# Patient Record
Sex: FEMALE | Race: WHITE | Employment: OTHER | URBAN - METROPOLITAN AREA
[De-identification: names, ages, dates, MRNs, and addresses within clinical notes are randomized per-mention and may not be internally consistent; named-entity substitution may affect disease eponyms.]

---

## 2021-08-17 ENCOUNTER — HOSPITAL ENCOUNTER (EMERGENCY)
Age: 86
Discharge: HOME | End: 2021-08-18
Attending: EMERGENCY MEDICINE
Payer: MEDICARE

## 2021-08-17 DIAGNOSIS — E16.2 HYPOGLYCEMIA: Primary | ICD-10-CM

## 2021-08-17 PROCEDURE — 99285 EMERGENCY DEPT VISIT HI MDM: CPT

## 2021-08-18 VITALS
BODY MASS INDEX: 29.27 KG/M2 | OXYGEN SATURATION: 96 % | HEART RATE: 71 BPM | DIASTOLIC BLOOD PRESSURE: 67 MMHG | SYSTOLIC BLOOD PRESSURE: 161 MMHG | RESPIRATION RATE: 24 BRPM | WEIGHT: 155 LBS | HEIGHT: 61 IN | TEMPERATURE: 98.1 F

## 2021-08-18 LAB
ALBUMIN SERPL-MCNC: 4 G/DL (ref 3.2–4.6)
ALBUMIN/GLOB SERPL: 1.2 {RATIO} (ref 1.2–3.5)
ALP SERPL-CCNC: 50 U/L (ref 50–136)
ALT SERPL-CCNC: 22 U/L (ref 12–65)
ANION GAP SERPL CALC-SCNC: 8 MMOL/L (ref 7–16)
AST SERPL-CCNC: 29 U/L (ref 15–37)
ATRIAL RATE: 241 BPM
BASOPHILS # BLD: 0 K/UL (ref 0–0.2)
BASOPHILS NFR BLD: 1 % (ref 0–2)
BILIRUB SERPL-MCNC: 0.8 MG/DL (ref 0.2–1.1)
BUN SERPL-MCNC: 32 MG/DL (ref 8–23)
CALCIUM SERPL-MCNC: 8.6 MG/DL (ref 8.3–10.4)
CALCULATED R AXIS, ECG10: 75 DEGREES
CALCULATED T AXIS, ECG11: 98 DEGREES
CHLORIDE SERPL-SCNC: 104 MMOL/L (ref 98–107)
CO2 SERPL-SCNC: 26 MMOL/L (ref 21–32)
CREAT SERPL-MCNC: 1.4 MG/DL (ref 0.6–1)
DIAGNOSIS, 93000: NORMAL
DIFFERENTIAL METHOD BLD: ABNORMAL
EOSINOPHIL # BLD: 0 K/UL (ref 0–0.8)
EOSINOPHIL NFR BLD: 0 % (ref 0.5–7.8)
ERYTHROCYTE [DISTWIDTH] IN BLOOD BY AUTOMATED COUNT: 13.5 % (ref 11.9–14.6)
GLOBULIN SER CALC-MCNC: 3.4 G/DL (ref 2.3–3.5)
GLUCOSE BLD STRIP.AUTO-MCNC: 131 MG/DL (ref 65–100)
GLUCOSE BLD STRIP.AUTO-MCNC: 157 MG/DL (ref 65–100)
GLUCOSE SERPL-MCNC: 222 MG/DL (ref 65–100)
HCT VFR BLD AUTO: 43.6 % (ref 35.8–46.3)
HGB BLD-MCNC: 13.4 G/DL (ref 11.7–15.4)
IMM GRANULOCYTES # BLD AUTO: 0 K/UL (ref 0–0.5)
IMM GRANULOCYTES NFR BLD AUTO: 1 % (ref 0–5)
LYMPHOCYTES # BLD: 0.6 K/UL (ref 0.5–4.6)
LYMPHOCYTES NFR BLD: 8 % (ref 13–44)
MCH RBC QN AUTO: 30 PG (ref 26.1–32.9)
MCHC RBC AUTO-ENTMCNC: 30.7 G/DL (ref 31.4–35)
MCV RBC AUTO: 97.8 FL (ref 79.6–97.8)
MONOCYTES # BLD: 0.5 K/UL (ref 0.1–1.3)
MONOCYTES NFR BLD: 6 % (ref 4–12)
NEUTS SEG # BLD: 7.1 K/UL (ref 1.7–8.2)
NEUTS SEG NFR BLD: 85 % (ref 43–78)
NRBC # BLD: 0 K/UL (ref 0–0.2)
PLATELET # BLD AUTO: 134 K/UL (ref 150–450)
PMV BLD AUTO: 11.5 FL (ref 9.4–12.3)
POTASSIUM SERPL-SCNC: 4.1 MMOL/L (ref 3.5–5.1)
PROT SERPL-MCNC: 7.4 G/DL (ref 6.3–8.2)
Q-T INTERVAL, ECG07: 374 MS
QRS DURATION, ECG06: 72 MS
QTC CALCULATION (BEZET), ECG08: 403 MS
RBC # BLD AUTO: 4.46 M/UL (ref 4.05–5.2)
SERVICE CMNT-IMP: ABNORMAL
SERVICE CMNT-IMP: ABNORMAL
SODIUM SERPL-SCNC: 138 MMOL/L (ref 136–145)
TSH SERPL DL<=0.005 MIU/L-ACNC: 5.45 UIU/ML (ref 0.36–3.74)
VENTRICULAR RATE, ECG03: 70 BPM
WBC # BLD AUTO: 8.4 K/UL (ref 4.3–11.1)

## 2021-08-18 PROCEDURE — 80053 COMPREHEN METABOLIC PANEL: CPT

## 2021-08-18 PROCEDURE — 93005 ELECTROCARDIOGRAM TRACING: CPT | Performed by: EMERGENCY MEDICINE

## 2021-08-18 PROCEDURE — 82962 GLUCOSE BLOOD TEST: CPT

## 2021-08-18 PROCEDURE — 84443 ASSAY THYROID STIM HORMONE: CPT

## 2021-08-18 PROCEDURE — 85025 COMPLETE CBC W/AUTO DIFF WBC: CPT

## 2021-08-18 NOTE — ED PROVIDER NOTES
28-year-old  female with history of diabetes, CKD, hypertension and depression presents to the emergency department complaining of altered mental status. According the patient, she took her normal doses of insulin and oral agents today, but is concerned she might not have eaten dinner this evening. According the patient, she has a deal with a friend of hers in the each call every evening to make sure the other is doing okay before going to bed. When a friend was unable to reach her, she called her daughter who called EMS. EMS found patient on the floor, somewhat giddy but very confused. Her blood sugar was 50, she was given oral glucose without improvement but D50 improved her mental status a great deal.  She denies any headache, paralysis or paresthesias. She denies any nausea or vomiting. She knows it is just after midnight and now Wednesday, knows who the president is but has little trouble with the year. She knows she is in the hospital.  She denies any pain. The history is provided by the patient and the EMS personnel. The history is limited by the condition of the patient. Low Blood Sugar   This is a new problem. The current episode started less than 1 hour ago. The problem has been rapidly improving. Associated symptoms include confusion and weakness. Pertinent negatives include no somnolence, no seizures, no unresponsiveness, no agitation, no delusions, no hallucinations, no self-injury, no violence, no tingling and no numbness. Mental status baseline is normal.  Risk factors: Diabetic and lives alone. Her past medical history is significant for diabetes, hypertension and depression. Her past medical history does not include seizures, liver disease, CVA, TIA, AIDS, COPD, dementia, psychotropic medication treatment, head trauma or heart disease.         Past Medical History:   Diagnosis Date    Chronic kidney disease     renal insufficiency    [facility] [address]     Diabetes ([facility] [address] [facility] 11/13/2014    Hypertension     Obesity 7/26/2016    Psychiatric disorder     depeession       Past Surgical History:   Procedure Laterality Date    HX CHOLECYSTECTOMY           No family history on file. Social History     Socioeconomic History    Marital status:      Spouse name: Not on file    Number of children: Not on file    Years of education: Not on file    Highest education level: Not on file   Occupational History    Not on file   Tobacco Use    Smoking status: Never Smoker   Substance and Sexual Activity    Alcohol use: No    Drug use: Not on file    Sexual activity: Not on file   Other Topics Concern    Not on file   Social History Narrative    Not on file     Social Determinants of Health     Financial Resource Strain:     Difficulty of Paying Living Expenses:    Food Insecurity:     Worried About Running Out of Food in the Last Year:     [address] in the Last Year:    Transportation Needs:     Lack of Transportation (Medical):  Lack of Transportation (Non-Medical):    Physical Activity:     Days of Exercise per Week:     Minutes of Exercise per Session:    Stress:     Feeling of Stress :    Social Connections:     Frequency of Communication with Friends and Family:     Frequency of Social Gatherings with Friends and Family:     Attends [employer] Services:     Active Member of Clubs or Organizations:     Attends Club or Organization Meetings:     Marital Status:    Intimate Partner Violence:     Fear of Current or Ex-Partner:     Emotionally Abused:     Physically Abused:     Sexually Abused: ALLERGIES: Ciprocinonide, Citalopram, Pioglitazone, Celecoxib, Codeine, Lisinopril, and Metronidazole    Review of Systems   Constitutional: Negative for chills, fatigue and fever. HENT: Negative for congestion. Gastrointestinal: Negative for abdominal pain, nausea and vomiting. Genitourinary: Negative for dysuria.    Neurological: Positive for weakness. Negative for tingling, seizures, facial asymmetry, light-headedness, numbness and headaches. Psychiatric/Behavioral: Positive for confusion. Negative for agitation, hallucinations and self-injury. All other systems reviewed and are negative. Vitals:    08/18/21 0005 08/18/21 0009   BP:  (!) 221/119   Pulse:  71   Resp: 24 24   Temp:  98.1 °F (36.7 °C)   SpO2:  97%   Weight: 70.3 kg (155 lb) 70.3 kg (155 lb)   Height: 5' 1\" (1.549 m) 5' 1\" (1.549 m)            Physical Exam  Vitals and nursing note reviewed. Constitutional:       General: She is not in acute distress. Appearance: She is well-developed. HENT:      Head: Normocephalic and atraumatic. Right Ear: External ear normal.      Left Ear: External ear normal.      Mouth/Throat:      Mouth: Mucous membranes are moist.   Eyes:      Extraocular Movements: Extraocular movements intact. Conjunctiva/sclera: Conjunctivae normal.      Pupils: Pupils are equal, round, and reactive to light. Cardiovascular:      Rate and Rhythm: Normal rate and regular rhythm. Heart sounds: Normal heart sounds. Pulmonary:      Effort: Pulmonary effort is normal.      Breath sounds: Normal breath sounds. Abdominal:      General: Bowel sounds are normal.      Palpations: Abdomen is soft. Tenderness: There is no abdominal tenderness. Musculoskeletal:         General: Normal range of motion. Cervical back: Normal range of motion and neck supple. Skin:     General: Skin is warm and dry. Capillary Refill: Capillary refill takes less than 2 seconds. Neurological:      Mental Status: She is alert and oriented to person, place, and time. Cranial Nerves: Cranial nerves are intact. Sensory: Sensation is intact. Motor: Motor function is intact. Coordination: Coordination is intact.    Psychiatric:         Mood and Affect: Mood and affect normal.         Speech: Speech normal.         Behavior: Behavior normal. Cognition and Memory: Cognition normal. She exhibits impaired recent memory. MDM  Number of Diagnoses or Management Options  Hypoglycemia: new and requires workup     Amount and/or Complexity of Data Reviewed  Clinical lab tests: ordered and reviewed  Tests in the medicine section of CPT®: ordered and reviewed (ECG interpretation for ECG dated 18 August 2021 at 12:10 a.m.: ECG reveals atrial fibrillation rate of 70 bpm, old septal infarct, normal QTC. No evidence of ischemic change. Abnormal ECG. Maty Redman MD  )  Review and summarize past medical records: yes    Risk of Complications, Morbidity, and/or Mortality  Presenting problems: moderate  Diagnostic procedures: minimal  Management options: moderate    Patient Progress  Patient progress: improved         Procedures    The patient was observed in the ED. patient had a fairly full meal, was observed for another hour in the emergency department on recheck the patient's blood sugar was 157. Patient was instructed to make sure that she ate as she was supposed to, because if she does not eat her blood sugar will drop and he could hurt her or even kill her. Patient voiced her understanding, was ambulated in the emergency department without any difficulty. She will be discharged home with instructions to follow-up with her family doctor with phone call tomorrow.     Results Reviewed:      Recent Results (from the past 24 hour(s))   GLUCOSE, POC    Collection Time: 08/18/21 12:12 AM   Result Value Ref Range    Glucose (POC) 131 (H) 65 - 100 mg/dL    Performed by Jesse    CBC WITH AUTOMATED DIFF    Collection Time: 08/18/21 12:18 AM   Result Value Ref Range    WBC 8.4 4.3 - 11.1 K/uL    RBC 4.46 4.05 - 5.2 M/uL    HGB 13.4 11.7 - 15.4 g/dL    HCT 43.6 35.8 - 46.3 %    MCV 97.8 79.6 - 97.8 FL    MCH 30.0 26.1 - 32.9 PG    MCHC 30.7 (L) 31.4 - 35.0 g/dL    RDW 13.5 11.9 - 14.6 %    PLATELET 002 (L) 221 - 450 K/uL    MPV 11.5 9.4 - 12.3 FL ABSOLUTE NRBC 0.00 0.0 - 0.2 K/uL    DF AUTOMATED      NEUTROPHILS 85 (H) 43 - 78 %    LYMPHOCYTES 8 (L) 13 - 44 %    MONOCYTES 6 4.0 - 12.0 %    EOSINOPHILS 0 (L) 0.5 - 7.8 %    BASOPHILS 1 0.0 - 2.0 %    IMMATURE GRANULOCYTES 1 0.0 - 5.0 %    ABS. NEUTROPHILS 7.1 1.7 - 8.2 K/UL    ABS. LYMPHOCYTES 0.6 0.5 - 4.6 K/UL    ABS. MONOCYTES 0.5 0.1 - 1.3 K/UL    ABS. EOSINOPHILS 0.0 0.0 - 0.8 K/UL    ABS. BASOPHILS 0.0 0.0 - 0.2 K/UL    ABS. IMM. GRANS. 0.0 0.0 - 0.5 K/UL   METABOLIC PANEL, COMPREHENSIVE    Collection Time: 08/18/21 12:18 AM   Result Value Ref Range    Sodium 138 136 - 145 mmol/L    Potassium 4.1 3.5 - 5.1 mmol/L    Chloride 104 98 - 107 mmol/L    CO2 26 21 - 32 mmol/L    Anion gap 8 7 - 16 mmol/L    Glucose 222 (H) 65 - 100 mg/dL    BUN 32 (H) 8 - 23 MG/DL    Creatinine 1.40 (H) 0.6 - 1.0 MG/DL    GFR est AA 46 (L) >60 ml/min/1.73m2    GFR est non-AA 38 (L) >60 ml/min/1.73m2    Calcium 8.6 8.3 - 10.4 MG/DL    Bilirubin, total 0.8 0.2 - 1.1 MG/DL    ALT (SGPT) 22 12 - 65 U/L    AST (SGOT) 29 15 - 37 U/L    Alk. phosphatase 50 50 - 136 U/L    Protein, total 7.4 6.3 - 8.2 g/dL    Albumin 4.0 3.2 - 4.6 g/dL    Globulin 3.4 2.3 - 3.5 g/dL    A-G Ratio 1.2 1.2 - 3.5     TSH 3RD GENERATION    Collection Time: 08/18/21 12:18 AM   Result Value Ref Range    TSH 5.450 (H) 0.358 - 3.740 uIU/mL   GLUCOSE, POC    Collection Time: 08/18/21  2:13 AM   Result Value Ref Range    Glucose (POC) 157 (H) 65 - 100 mg/dL    Performed by Darryl        I discussed the results of all labs, procedures, radiographs, and treatments with the patient and available family. Treatment plan is agreed upon and the patient is ready for discharge. All voiced understanding of the discharge plan and medication instructions or changes as appropriate. Questions about treatment in the ED were answered. All were encouraged to return should symptoms worsen or new problems develop.

## 2021-08-18 NOTE — ED NOTES
I have reviewed discharge instructions with the patient. The patient verbalized understanding. Patient left ED via Discharge Method: ambulatory to Home by self    Opportunity for questions and clarification provided. Patient given 0 scripts. To continue your aftercare when you leave the hospital, you may receive an automated call from our care team to check in on how you are doing. This is a free service and part of our promise to provide the best care and service to meet your aftercare needs.  If you have questions, or wish to unsubscribe from this service please call [phone]. Thank you for Choosing our [facility] Emergency Department.

## 2021-08-18 NOTE — ED TRIAGE NOTES
Patient arrives via EMS from home with hypoglycemia. Patient was found on floor by EMS, family called because she had not been answering the phone. EMS noted BGL 52, gave 15 mg oral glucose and 250ml D10. EMS states patient had improvement in mental status after the D10. Patient arrives alert and oriented to person, place, time.

## 2021-11-15 ENCOUNTER — HOSPITAL ENCOUNTER (EMERGENCY)
Age: 86
Discharge: HOME | End: 2021-11-15
Attending: EMERGENCY MEDICINE
Payer: MEDICARE

## 2021-11-15 ENCOUNTER — APPOINTMENT (OUTPATIENT)
Dept: CT IMAGING | Age: 86
End: 2021-11-15
Attending: EMERGENCY MEDICINE
Payer: MEDICARE

## 2021-11-15 VITALS
DIASTOLIC BLOOD PRESSURE: 85 MMHG | WEIGHT: 155 LBS | RESPIRATION RATE: 17 BRPM | HEART RATE: 63 BPM | BODY MASS INDEX: 29.27 KG/M2 | SYSTOLIC BLOOD PRESSURE: 168 MMHG | OXYGEN SATURATION: 95 % | TEMPERATURE: 97.8 F | HEIGHT: 61 IN

## 2021-11-15 DIAGNOSIS — E01.0 THYROMEGALY: ICD-10-CM

## 2021-11-15 DIAGNOSIS — S16.1XXA STRAIN OF NECK MUSCLE, INITIAL ENCOUNTER: ICD-10-CM

## 2021-11-15 DIAGNOSIS — S09.90XA CLOSED HEAD INJURY, INITIAL ENCOUNTER: Primary | ICD-10-CM
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
## 2021-11-15 DIAGNOSIS — R73.9 HYPERGLYCEMIA: ICD-10-CM

## 2021-11-15 LAB
GLUCOSE BLD STRIP.AUTO-MCNC: 213 MG/DL (ref 65–100)
SERVICE CMNT-IMP: ABNORMAL

## 2021-11-15 PROCEDURE — 74011250636 HC RX REV CODE- 250/636: Performed by: EMERGENCY MEDICINE

## 2021-11-15 PROCEDURE — 72125 CT NECK SPINE W/O DYE: CPT

## 2021-11-15 PROCEDURE — 70450 CT HEAD/BRAIN W/O DYE: CPT

## 2021-11-15 PROCEDURE — 82962 GLUCOSE BLOOD TEST: CPT

## 2021-11-15 PROCEDURE — 99284 EMERGENCY DEPT VISIT MOD MDM: CPT

## 2021-11-15 PROCEDURE — 90715 TDAP VACCINE 7 YRS/> IM: CPT | Performed by: EMERGENCY MEDICINE

## 2021-11-15 PROCEDURE — 90471 IMMUNIZATION ADMIN: CPT

## 2021-11-15 RX ADMIN — TETANUS TOXOID, REDUCED DIPHTHERIA TOXOID AND ACELLULAR PERTUSSIS VACCINE, ADSORBED 0.5 ML: 5; 2.5; 8; 8; 2.5 SUSPENSION INTRAMUSCULAR at 14:51

## 2021-11-15 NOTE — DISCHARGE INSTRUCTIONS
Continue your current medications  Use Tylenol as needed for headache pain  Monitor your blood sugar carefully    Please call your endocrinologist in the morning to discuss the enlarged thyroid gland that we found on the CAT scan of your neck    Call your doctor/follow up doctor to set up appointment for recheck visit    Return to ER for any worsening symptoms or new problems which may arise

## 2021-11-15 NOTE — ED TRIAGE NOTES
Pt arrives via EMS from home with CO mechanical fall, laid on the ground for 2 hours. Abrasion to posterior head. Denies LOC, takes eliquis for AF.  VSS with EMS, pt p/o x 4

## 2021-11-15 NOTE — ED PROVIDER NOTES
42-year-old female presents to the ER with complaints of pain and swelling to the back of her head and some neck pain also. Patient states that when she was taking her trash out today the garbage can lid knocked her backwards and she fell backward striking the back of her head  She denies loss of consciousness  Patient did unfortunately on the cold concrete for between 1 to 2 hours. She was rescued by [employer]. They did summon EMS to help the patient up and she was brought here to the ER for further evaluation  Currently the patient is awake alert and oriented really only complains of some discomfort in the back of her head and some mild diffuse neck pain  She has had no vision or hearing changes she is not been dizzy lightheaded or nauseous. She denies any numbness or weakness in any of her extremities    The history is provided by the patient, a relative and the EMS personnel. Fall  The accident occurred 3 to 5 hours ago. The fall occurred while standing. She fell from a height of ground level. She landed on concrete. The volume of blood lost was minimal. The point of impact was the head and neck. The pain is present in the head and neck. The pain is moderate. She was not ambulatory at the scene. There was no entrapment after the fall. There was no drug use involved in the accident. There was no alcohol use involved in the accident. Associated symptoms include laceration. Pertinent negatives include no fever, no numbness, no abdominal pain, no vomiting, no headaches, no extremity weakness, no hearing loss, no loss of consciousness and no tingling. The risk factors include being elderly. The symptoms are aggravated by activity. She has tried nothing for the symptoms. It is unknown when the patient last had a tetanus shot.         Past Medical History:   Diagnosis Date    Chronic kidney disease     renal insufficiency    [facility] [address]     [facility]    Hypertension     Obesity 7/26/2016    Psychiatric disorder     depeession       Past Surgical History:   Procedure Laterality Date    HX CHOLECYSTECTOMY           No family history on file. Social History     Socioeconomic History    Marital status:      Spouse name: Not on file    Number of children: Not on file    Years of education: Not on file    Highest education level: Not on file   Occupational History    Not on file   Tobacco Use    Smoking status: Never Smoker    Smokeless tobacco: Not on file   Substance and Sexual Activity    Alcohol use: No    Drug use: Not on file    Sexual activity: Not on file   Other Topics Concern    Not on file   Social History Narrative    Not on file     Social Determinants of Health     Financial Resource Strain:     Difficulty of Paying Living Expenses: Not on file   Food Insecurity:     Worried About Running Out of Food in the Last Year: Not on file    Boni of Food in the Last Year: Not on file   Transportation Needs:     Lack of Transportation (Medical): Not on file    Lack of Transportation (Non-Medical):  Not on file   Physical Activity:     Days of Exercise per Week: Not on file    Minutes of Exercise per Session: Not on file   Stress:     Feeling of Stress : Not on file   Social Connections:     Frequency of Communication with Friends and Family: Not on file    Frequency of Social Gatherings with Friends and Family: Not on file    Attends [employer]ism Services: Not on file    Active Member of Clubs or Organizations: Not on file    Attends Club or Organization Meetings: Not on file    Marital Status: Not on file   Intimate Partner Violence:     Fear of Current or Ex-Partner: Not on file    Emotionally Abused: Not on file    Physically Abused: Not on file    Sexually Abused: Not on file   Housing Stability:     Unable to Pay for Housing in the Last Year: Not on file    Number of [place] in the Last Year: Not on file    Unstable Housing in the Last Year: Not on file         ALLERGIES: Ciprocinonide, Citalopram, Pioglitazone, Celecoxib, Codeine, Lisinopril, and Metronidazole    Review of Systems   Constitutional: Negative for activity change, appetite change, chills and fever. HENT: Negative for congestion, ear pain and rhinorrhea. Eyes: Negative for photophobia and discharge. Respiratory: Negative for cough and shortness of breath. Cardiovascular: Positive for leg swelling. Negative for chest pain and palpitations. Gastrointestinal: Negative for abdominal pain, constipation, diarrhea and vomiting. Endocrine: Negative for cold intolerance and heat intolerance. Genitourinary: Negative for dysuria and flank pain. Musculoskeletal: Positive for arthralgias. Negative for extremity weakness, myalgias and neck pain. Skin: Positive for wound. Negative for rash. Allergic/Immunologic: Negative for environmental allergies and food allergies. Neurological: Negative for tingling, loss of consciousness, syncope, numbness and headaches. Hematological: Negative for adenopathy. Does not bruise/bleed easily. Psychiatric/Behavioral: Negative for dysphoric mood. The patient is not nervous/anxious. All other systems reviewed and are negative. Vitals:    11/15/21 1335   BP: (!) 189/76   Pulse: 68   Resp: 20   Temp: 97.8 °F (36.6 °C)   SpO2: 95%   Weight: 70.3 kg (155 lb)   Height: 5' 1\" (1.549 m)            Physical Exam  Vitals and nursing note reviewed. Constitutional:       General: She is in acute distress. Appearance: Normal appearance. She is well-developed and normal weight. HENT:      Head: Normocephalic. Right Ear: External ear normal.      Left Ear: External ear normal.      Nose: Nose normal.      Mouth/Throat:      Mouth: Mucous membranes are moist.      Pharynx: Oropharynx is clear. No oropharyngeal exudate or posterior oropharyngeal erythema. Eyes:      Extraocular Movements: Extraocular movements intact. Conjunctiva/sclera: Conjunctivae normal.      Pupils: Pupils are equal, round, and reactive to light. Neck:      Vascular: No JVD. Cardiovascular:      Rate and Rhythm: Normal rate and regular rhythm. Heart sounds: Normal heart sounds. No murmur heard. No friction rub. No gallop. Pulmonary:      Effort: Pulmonary effort is normal.      Breath sounds: Normal breath sounds. Abdominal:      General: Bowel sounds are normal. There is no distension. Palpations: Abdomen is soft. There is no mass. Tenderness: There is no abdominal tenderness. Musculoskeletal:         General: No deformity. Normal range of motion. Cervical back: Neck supple. Muscular tenderness present. Skin:     General: Skin is warm and dry. Capillary Refill: Capillary refill takes less than 2 seconds. Findings: Laceration present. No rash. Neurological:      Mental Status: She is alert and oriented to person, place, and time. Cranial Nerves: No cranial nerve deficit. Sensory: No sensory deficit. Gait: Gait normal.   Psychiatric:         Mood and Affect: Mood and affect normal.         Speech: Speech normal.         Behavior: Behavior normal. Behavior is cooperative. Thought Content:  Thought content normal.         Cognition and Memory: Cognition and memory normal.         Judgment: Judgment normal.          MDM  Number of Diagnoses or Management Options  Closed head injury, initial encounter: new and requires workup  Fall, initial encounter: new and requires workup  Hyperglycemia: established and worsening  Strain of neck muscle, initial encounter: new and requires workup  Thyromegaly: new and requires workup  Diagnosis management comments: 43-year-old female presents after mechanical fall at home earlier today  She is currently awake alert and oriented does have some discomfort in the back of her head but otherwise feels reasonably well    We will check CT head and CT cervical spine    Remainder of her exam is unrevealing. Specifically she has no neurologic deficits. She is awake alert and oriented with clear speech    We will update her tetanus    3:04 PM  CT head revealed subcu hematoma but no fracture or intracranial hemorrhage  CT cervical spine revealed concern over enlarged thyroid but no other acute injury    Findings are reviewed with patient and son at bedside    Patient is stable for discharge  Advised to call her endocrinologist tomorrow to follow-up on the thyroid gland enlargement. Amount and/or Complexity of Data Reviewed  Tests in the radiology section of CPT®: ordered and reviewed  Tests in the medicine section of CPT®: ordered and reviewed  Decide to obtain previous medical records or to obtain history from someone other than the patient: yes  Review and summarize past medical records: yes  Independent visualization of images, tracings, or specimens: yes    Risk of Complications, Morbidity, and/or Mortality  Presenting problems: moderate  Diagnostic procedures: moderate  Management options: moderate  General comments: Elements of this note have been dictated via voice recognition software. Text and phrases may be limited by the accuracy of the software. The chart has been reviewed, but errors may still be present.       Patient Progress  Patient progress: improved         Procedures

## 2021-12-12 ENCOUNTER — HOSPITAL ENCOUNTER (INPATIENT)
Age: 86
LOS: 1 days | Discharge: NURSING, REHAB OR LONG-TERM CARE | DRG: 690 | End: 2021-12-15
Attending: EMERGENCY MEDICINE | Admitting: INTERNAL MEDICINE
Payer: MEDICARE

## 2021-12-12 ENCOUNTER — APPOINTMENT (OUTPATIENT)
Dept: CT IMAGING | Age: 86
DRG: 690 | End: 2021-12-12
Attending: EMERGENCY MEDICINE
Payer: MEDICARE

## 2021-12-12 DIAGNOSIS — G89.4 CHRONIC PAIN DISORDER: ICD-10-CM

## 2021-12-12 DIAGNOSIS — N30.00 ACUTE CYSTITIS WITHOUT HEMATURIA: Primary | ICD-10-CM

## 2021-12-12 DIAGNOSIS — F41.9 ANXIETY: ICD-10-CM

## 2021-12-12 LAB
ANION GAP SERPL CALC-SCNC: 7 MMOL/L (ref 7–16)
BACTERIA URNS QL MICRO: ABNORMAL /HPF
BASOPHILS # BLD: 0 K/UL (ref 0–0.2)
BASOPHILS NFR BLD: 0 % (ref 0–2)
BUN SERPL-MCNC: 25 MG/DL (ref 8–23)
CALCIUM SERPL-MCNC: 9.1 MG/DL (ref 8.3–10.4)
CASTS URNS QL MICRO: ABNORMAL /LPF
CHLORIDE SERPL-SCNC: 103 MMOL/L (ref 98–107)
CO2 SERPL-SCNC: 28 MMOL/L (ref 21–32)
CREAT SERPL-MCNC: 1.5 MG/DL (ref 0.6–1)
DIFFERENTIAL METHOD BLD: ABNORMAL
EOSINOPHIL # BLD: 0.3 K/UL (ref 0–0.8)
EOSINOPHIL NFR BLD: 2 % (ref 0.5–7.8)
EPI CELLS #/AREA URNS HPF: 0 /HPF
ERYTHROCYTE [DISTWIDTH] IN BLOOD BY AUTOMATED COUNT: 13.8 % (ref 11.9–14.6)
GLUCOSE SERPL-MCNC: 241 MG/DL (ref 65–100)
HCT VFR BLD AUTO: 39.1 % (ref 35.8–46.3)
HGB BLD-MCNC: 12.3 G/DL (ref 11.7–15.4)
IMM GRANULOCYTES # BLD AUTO: 0 K/UL (ref 0–0.5)
IMM GRANULOCYTES NFR BLD AUTO: 0 % (ref 0–5)
LACTATE SERPL-SCNC: 1.5 MMOL/L (ref 0.4–2)
LYMPHOCYTES # BLD: 0.9 K/UL (ref 0.5–4.6)
LYMPHOCYTES NFR BLD: 8 % (ref 13–44)
MAGNESIUM SERPL-MCNC: 1.7 MG/DL (ref 1.8–2.4)
MCH RBC QN AUTO: 29.7 PG (ref 26.1–32.9)
MCHC RBC AUTO-ENTMCNC: 31.5 G/DL (ref 31.4–35)
MCV RBC AUTO: 94.4 FL (ref 79.6–97.8)
MONOCYTES # BLD: 0.6 K/UL (ref 0.1–1.3)
MONOCYTES NFR BLD: 5 % (ref 4–12)
NEUTS SEG # BLD: 9.6 K/UL (ref 1.7–8.2)
NEUTS SEG NFR BLD: 84 % (ref 43–78)
NRBC # BLD: 0 K/UL (ref 0–0.2)
PLATELET # BLD AUTO: 177 K/UL (ref 150–450)
PMV BLD AUTO: 11.2 FL (ref 9.4–12.3)
POTASSIUM SERPL-SCNC: 3.8 MMOL/L (ref 3.5–5.1)
RBC # BLD AUTO: 4.14 M/UL (ref 4.05–5.2)
RBC #/AREA URNS HPF: ABNORMAL /HPF
SODIUM SERPL-SCNC: 138 MMOL/L (ref 136–145)
WBC # BLD AUTO: 11.4 K/UL (ref 4.3–11.1)
WBC URNS QL MICRO: >100 /HPF

## 2021-12-12 PROCEDURE — 96365 THER/PROPH/DIAG IV INF INIT: CPT

## 2021-12-12 PROCEDURE — 87088 URINE BACTERIA CULTURE: CPT

## 2021-12-12 PROCEDURE — 81015 MICROSCOPIC EXAM OF URINE: CPT

## 2021-12-12 PROCEDURE — 96374 THER/PROPH/DIAG INJ IV PUSH: CPT

## 2021-12-12 PROCEDURE — 87086 URINE CULTURE/COLONY COUNT: CPT

## 2021-12-12 PROCEDURE — 74011250636 HC RX REV CODE- 250/636: Performed by: EMERGENCY MEDICINE

## 2021-12-12 PROCEDURE — 83605 ASSAY OF LACTIC ACID: CPT

## 2021-12-12 PROCEDURE — 80048 BASIC METABOLIC PNL TOTAL CA: CPT

## 2021-12-12 PROCEDURE — 99284 EMERGENCY DEPT VISIT MOD MDM: CPT

## 2021-12-12 PROCEDURE — 81003 URINALYSIS AUTO W/O SCOPE: CPT

## 2021-12-12 PROCEDURE — 74011000258 HC RX REV CODE- 258: Performed by: EMERGENCY MEDICINE

## 2021-12-12 PROCEDURE — 85025 COMPLETE CBC W/AUTO DIFF WBC: CPT

## 2021-12-12 PROCEDURE — 70450 CT HEAD/BRAIN W/O DYE: CPT

## 2021-12-12 PROCEDURE — 83735 ASSAY OF MAGNESIUM: CPT

## 2021-12-12 PROCEDURE — 93005 ELECTROCARDIOGRAM TRACING: CPT | Performed by: EMERGENCY MEDICINE

## 2021-12-12 PROCEDURE — 87186 SC STD MICRODIL/AGAR DIL: CPT

## 2021-12-12 RX ADMIN — CEFTRIAXONE 1 G: 1 INJECTION, POWDER, FOR SOLUTION INTRAMUSCULAR; INTRAVENOUS at 23:51

## 2021-12-13 PROBLEM — N39.0 UTI (URINARY TRACT INFECTION): Status: ACTIVE | Noted: 2021-12-13

## 2021-12-13 PROBLEM — E04.9 THYROID ENLARGEMENT: Status: ACTIVE | Noted: 2021-12-13

## 2021-12-13 PROBLEM — S00.03XA SCALP HEMATOMA: Status: ACTIVE | Noted: 2021-12-13

## 2021-12-13 LAB
BILIRUB UR QL: NEGATIVE
GLUCOSE BLD STRIP.AUTO-MCNC: 114 MG/DL (ref 65–100)
GLUCOSE BLD STRIP.AUTO-MCNC: 145 MG/DL (ref 65–100)
GLUCOSE BLD STRIP.AUTO-MCNC: 216 MG/DL (ref 65–100)
GLUCOSE BLD STRIP.AUTO-MCNC: 95 MG/DL (ref 65–100)
GLUCOSE UR QL STRIP.AUTO: 100 MG/DL
KETONES UR-MCNC: NEGATIVE MG/DL
LEUKOCYTE ESTERASE UR QL STRIP: ABNORMAL
NITRITE UR QL: NEGATIVE
PH UR: 5.5 [PH] (ref 5–9)
PROT UR QL: 100 MG/DL
RBC # UR STRIP: ABNORMAL /UL
SERVICE CMNT-IMP: ABNORMAL
SERVICE CMNT-IMP: NORMAL
SP GR UR: >1.03 (ref 1–1.02)
UROBILINOGEN UR QL: 0.2 EU/DL (ref 0.2–1)

## 2021-12-13 PROCEDURE — 74011000250 HC RX REV CODE- 250: Performed by: HOSPITALIST

## 2021-12-13 PROCEDURE — 96376 TX/PRO/DX INJ SAME DRUG ADON: CPT

## 2021-12-13 PROCEDURE — G0378 HOSPITAL OBSERVATION PER HR: HCPCS

## 2021-12-13 PROCEDURE — 97162 PT EVAL MOD COMPLEX 30 MIN: CPT

## 2021-12-13 PROCEDURE — 2709999900 HC NON-CHARGEABLE SUPPLY

## 2021-12-13 PROCEDURE — 74011250637 HC RX REV CODE- 250/637: Performed by: HOSPITALIST

## 2021-12-13 PROCEDURE — 74011000258 HC RX REV CODE- 258: Performed by: HOSPITALIST

## 2021-12-13 PROCEDURE — 74011250636 HC RX REV CODE- 250/636: Performed by: HOSPITALIST

## 2021-12-13 PROCEDURE — 86580 TB INTRADERMAL TEST: CPT | Performed by: INTERNAL MEDICINE

## 2021-12-13 PROCEDURE — 82962 GLUCOSE BLOOD TEST: CPT

## 2021-12-13 PROCEDURE — 74011000250 HC RX REV CODE- 250: Performed by: INTERNAL MEDICINE

## 2021-12-13 PROCEDURE — 97530 THERAPEUTIC ACTIVITIES: CPT

## 2021-12-13 RX ORDER — POLYETHYLENE GLYCOL 3350 17 G/17G
17 POWDER, FOR SOLUTION ORAL DAILY PRN
Status: DISCONTINUED | OUTPATIENT
Start: 2021-12-13 | End: 2021-12-15 | Stop reason: HOSPADM

## 2021-12-13 RX ORDER — GABAPENTIN 100 MG/1
100 CAPSULE ORAL 3 TIMES DAILY
Status: DISCONTINUED | OUTPATIENT
Start: 2021-12-13 | End: 2021-12-15 | Stop reason: HOSPADM

## 2021-12-13 RX ORDER — ONDANSETRON 4 MG/1
4 TABLET, ORALLY DISINTEGRATING ORAL
Status: DISCONTINUED | OUTPATIENT
Start: 2021-12-13 | End: 2021-12-15 | Stop reason: HOSPADM

## 2021-12-13 RX ORDER — SODIUM CHLORIDE 0.9 % (FLUSH) 0.9 %
5-40 SYRINGE (ML) INJECTION EVERY 8 HOURS
Status: DISCONTINUED | OUTPATIENT
Start: 2021-12-13 | End: 2021-12-15 | Stop reason: HOSPADM

## 2021-12-13 RX ORDER — PROPRANOLOL HYDROCHLORIDE 10 MG/1
20 TABLET ORAL 2 TIMES DAILY
Status: DISCONTINUED | OUTPATIENT
Start: 2021-12-13 | End: 2021-12-15 | Stop reason: HOSPADM

## 2021-12-13 RX ORDER — ONDANSETRON 2 MG/ML
4 INJECTION INTRAMUSCULAR; INTRAVENOUS
Status: DISCONTINUED | OUTPATIENT
Start: 2021-12-13 | End: 2021-12-15 | Stop reason: HOSPADM

## 2021-12-13 RX ORDER — SODIUM CHLORIDE 9 MG/ML
50 INJECTION, SOLUTION INTRAVENOUS CONTINUOUS
Status: DISPENSED | OUTPATIENT
Start: 2021-12-13 | End: 2021-12-14

## 2021-12-13 RX ORDER — PRAVASTATIN SODIUM 20 MG/1
80 TABLET ORAL
Status: DISCONTINUED | OUTPATIENT
Start: 2021-12-13 | End: 2021-12-15 | Stop reason: HOSPADM

## 2021-12-13 RX ORDER — SODIUM CHLORIDE 0.9 % (FLUSH) 0.9 %
5-40 SYRINGE (ML) INJECTION AS NEEDED
Status: DISCONTINUED | OUTPATIENT
Start: 2021-12-13 | End: 2021-12-15 | Stop reason: HOSPADM

## 2021-12-13 RX ORDER — INSULIN LISPRO 100 [IU]/ML
INJECTION, SOLUTION INTRAVENOUS; SUBCUTANEOUS
Status: DISCONTINUED | OUTPATIENT
Start: 2021-12-13 | End: 2021-12-15 | Stop reason: HOSPADM

## 2021-12-13 RX ORDER — FENOFIBRATE 160 MG/1
160 TABLET ORAL DAILY
Status: DISCONTINUED | OUTPATIENT
Start: 2021-12-13 | End: 2021-12-15 | Stop reason: HOSPADM

## 2021-12-13 RX ORDER — LORAZEPAM 0.5 MG/1
0.5 TABLET ORAL
Status: DISCONTINUED | OUTPATIENT
Start: 2021-12-13 | End: 2021-12-15 | Stop reason: HOSPADM

## 2021-12-13 RX ORDER — LATANOPROST 50 UG/ML
1 SOLUTION/ DROPS OPHTHALMIC
Status: DISCONTINUED | OUTPATIENT
Start: 2021-12-13 | End: 2021-12-15 | Stop reason: HOSPADM

## 2021-12-13 RX ORDER — GLIPIZIDE 5 MG/1
5 TABLET ORAL
Status: DISCONTINUED | OUTPATIENT
Start: 2021-12-13 | End: 2021-12-15 | Stop reason: HOSPADM

## 2021-12-13 RX ORDER — ACETAMINOPHEN 650 MG/1
650 SUPPOSITORY RECTAL
Status: DISCONTINUED | OUTPATIENT
Start: 2021-12-13 | End: 2021-12-15 | Stop reason: HOSPADM

## 2021-12-13 RX ORDER — ACETAMINOPHEN 325 MG/1
650 TABLET ORAL
Status: DISCONTINUED | OUTPATIENT
Start: 2021-12-13 | End: 2021-12-15 | Stop reason: HOSPADM

## 2021-12-13 RX ADMIN — GLIPIZIDE 5 MG: 5 TABLET ORAL at 09:05

## 2021-12-13 RX ADMIN — LATANOPROST 1 DROP: 50 SOLUTION OPHTHALMIC at 06:17

## 2021-12-13 RX ADMIN — LATANOPROST 1 DROP: 50 SOLUTION OPHTHALMIC at 21:38

## 2021-12-13 RX ADMIN — SODIUM CHLORIDE 50 ML/HR: 900 INJECTION, SOLUTION INTRAVENOUS at 22:56

## 2021-12-13 RX ADMIN — Medication 10 ML: at 13:27

## 2021-12-13 RX ADMIN — GABAPENTIN 100 MG: 100 CAPSULE ORAL at 09:05

## 2021-12-13 RX ADMIN — Medication 10 ML: at 21:36

## 2021-12-13 RX ADMIN — PRAVASTATIN SODIUM 80 MG: 20 TABLET ORAL at 06:17

## 2021-12-13 RX ADMIN — FENOFIBRATE 160 MG: 160 TABLET ORAL at 09:05

## 2021-12-13 RX ADMIN — TUBERCULIN PURIFIED PROTEIN DERIVATIVE 5 UNITS: 5 INJECTION, SOLUTION INTRADERMAL at 10:42

## 2021-12-13 RX ADMIN — CEFTRIAXONE 1 G: 1 INJECTION, POWDER, FOR SOLUTION INTRAMUSCULAR; INTRAVENOUS at 22:49

## 2021-12-13 RX ADMIN — PROPRANOLOL HYDROCHLORIDE 20 MG: 10 TABLET ORAL at 09:05

## 2021-12-13 RX ADMIN — APIXABAN 2.5 MG: 2.5 TABLET, FILM COATED ORAL at 09:05

## 2021-12-13 RX ADMIN — SODIUM CHLORIDE 50 ML/HR: 900 INJECTION, SOLUTION INTRAVENOUS at 01:53

## 2021-12-13 RX ADMIN — GLIPIZIDE 5 MG: 5 TABLET ORAL at 16:17

## 2021-12-13 RX ADMIN — PROPRANOLOL HYDROCHLORIDE 20 MG: 10 TABLET ORAL at 17:01

## 2021-12-13 RX ADMIN — Medication 5 ML: at 06:00

## 2021-12-13 RX ADMIN — GABAPENTIN 100 MG: 100 CAPSULE ORAL at 16:17

## 2021-12-13 RX ADMIN — APIXABAN 2.5 MG: 2.5 TABLET, FILM COATED ORAL at 21:36

## 2021-12-13 RX ADMIN — GABAPENTIN 100 MG: 100 CAPSULE ORAL at 21:36

## 2021-12-13 RX ADMIN — PRAVASTATIN SODIUM 80 MG: 20 TABLET ORAL at 21:36

## 2021-12-13 NOTE — ED NOTES
Pt arrives via [place] [id number] with a CC of fatigue. Fire department had been called earlier in the day due to pt sliding off of couch. Family reports weakness has increased throughout the day, called EMS when pt was unable to get off of couch again. VSS en route. Arrives 117/66. Bgl 300, hx of diabetes. Family reports 4 falls in the last 3 months.

## 2021-12-13 NOTE — PROGRESS NOTES
12/13/21 0132   Dual Skin Pressure Injury Assessment   Dual Skin Pressure Injury Assessment WDL   Second Care Provider (Based on [address] [place] [address]) SANDRA Falcon   Skin Integumentary   Skin Integumentary (WDL) X    Pressure  Injury Documentation No Pressure Injury Noted-Pressure Ulcer Prevention Initiated   Skin Color Ecchymosis (comment); Red  (groin/belly skin folds)   Skin Condition/Temp Flaky; Dry; Warm   Skin Integrity Abrasion; Other (comment); Cracked  (bruising on neck/ abrasion on elbows/scabs BLE)   Turgor Epidermis thin w/ loss of subcut tissue   Hair Growth Present   Nails X   Exceptions to WDL Thick   Wound Prevention and Protection Methods   Orientation of Wound Prevention Posterior   Location of Wound Prevention Sacrum/Coccyx   Dressing Present  No   Wound Offloading (Prevention Methods) Bed, pressure reduction mattress; Repositioning     Pt has no evidence of skin breakdown on sacrum.

## 2021-12-13 NOTE — PROGRESS NOTES
Hospitalist Progress Note   Admit Date:  2021  8:21 PM   Name:  Amy Bradley   Age:  81 y.o. Sex:  female  :  1935   MRN:  [id number]   Room:  [facility]/    Presenting Complaint: Fatigue    Reason(s) for Admission: UTI (urinary tract infection) [N39.0]     Hospital Course & Interval History:     Amy Bradley is a 81 y.o. female with medical history of P-AFIB, DM2, HTN who presented to ED with concerns about global weakness.  She apparently had a hard time getting off the couch earlier today.  Fire department was called because she could not get off the floor after she tried to get up to get to the restroom. She states she sort of just slid down to the floor.  At that time she said EMS did not transport her because she did not think she needs to go to the hospital.  Several hours later a similar thing happened where the patient tried to get up to get to the bathroom but was too weak to successfully do that.  EMS was called again and this time she agreed to be transported to the hospital. Moses Anton said she has not had any focal symptoms including no focal arm or leg weakness.  She had no language or speech problems.  She has had no fevers or chills and no nausea, vomiting, or diarrhea.  She denies any cough or difficulty breathing.     Family also reported that she has had multiple falls over the past couple of months.  She had an ER visit with CT scan of her head in mid November.  That time there was no obvious evidence of trauma.     No other associated symptoms.     Tonight, Workup in ER reveals UTI, no fever, mild leukocytosis, normal lactic acid. She had CT head shows nothing acute.     Hospitalist asked to admit    Subjective (21): Patient seen in bedside chair. Oriented to place and person. Excited to be getting physical therapy. Denies acute complaints other than weakness.      Assessment & Plan:     Principal Problem:    UTI (urinary tract infection) (2021)     - continue Rocephin D2, follow for cultures      Diabetes ([facility] (11/13/2014)    12/13 - continue glucagon and SSI      Atrial fibrillation with RVR ([facility] (11/17/2014)  12/13 - rate controlled, on eliquis        Essential hypertension with goal blood pressure less than 140/90 (8/25/2016)    12/13 - stable, continue inderal        Dispo/Discharge Planning:     PT recs for STR. CM following, PPD ordered    Diet:  ADULT DIET Regular; 4 carb choices (60 gm/meal)  DVT PPx: eliquis  Code status: Full Code    Hospital Problems as of 12/13/2021 Never Reviewed          Codes Class Noted - Resolved POA    * (Principal) UTI (urinary tract infection) ICD-10-CM: N39.0  ICD-9-CM: 599.0  12/13/2021 - Present Unknown        Scalp hematoma ICD-10-CM: S00. 03XA  ICD-9-CM: 784  12/13/2021 - Present         Thyroid enlargement ICD-10-CM: E04.9  ICD-9-CM: 240.9  12/13/2021 - Present         Essential hypertension with goal blood pressure less than 140/90 ICD-10-CM: I10  ICD-9-CM: 401.9  8/25/2016 - Present Yes        Atrial fibrillation with RVR (HCC) ICD-10-CM: I48.91  ICD-9-CM: 427.31  11/17/2014 - Present Yes        Diabetes ([facility] ICD-10-CM: E11.9  ICD-9-CM: 250.00  11/13/2014 - Present Yes              Objective:     Patient Vitals for the past 24 hrs:   Temp Pulse Resp BP SpO2   12/13/21 1131 98.3 °F (36.8 °C) 62 20 123/63 94 %   12/13/21 0718 98 °F (36.7 °C) 76 20 (!) 133/53 95 %   12/13/21 0434 97.9 °F (36.6 °C) 75 18 (!) 132/50 94 %   12/13/21 0125 98 °F (36.7 °C) 80 18 (!) 143/41 95 %   12/13/21 0124 98 °F (36.7 °C) 80 18 (!) 138/39 95 %   12/12/21 2026 98.4 °F (36.9 °C) 84 19 117/66 96 %     Oxygen Therapy  O2 Sat (%): 94 % (12/13/21 1131)  Pulse via Oximetry: 83 beats per minute (12/12/21 2026)  O2 Device: None (Room air) (12/13/21 0923)    Estimated body mass index is 27.99 kg/m² as calculated from the following:    Height as of this encounter: 5' 1\" (1.549 m). Weight as of this encounter: 67.2 kg (148 lb 2.4 oz).     Intake/Output Summary (Last 24 hours) at 12/13/2021 1532  Last data filed at 12/13/2021 1409  Gross per 24 hour   Intake 300 ml   Output    Net 300 ml         Physical Exam:     Blood pressure 123/63, pulse 62, temperature 98.3 °F (36.8 °C), resp. rate 20, height 5' 1\" (1.549 m), weight 67.2 kg (148 lb 2.4 oz), SpO2 94 %. General:    Well nourished. No overt distress, pleasantly confused  Head:  Normocephalic, atraumatic  Eyes:  Sclerae appear normal.  Pupils equally round. ENT:  Nares appear normal, no drainage. Moist oral mucosa  Neck:  No restricted ROM. Trachea midline   CV:   RRR. No m/r/g. No jugular venous distension. Lungs:   CTAB. No wheezing, rhonchi, or rales. Respirations even, unlabored  Abdomen: Bowel sounds present. Soft, nontender, nondistended. Extremities: No cyanosis or clubbing. No edema  Skin:     No rashes and normal coloration. Warm and dry. Neuro:  CN II-XII grossly intact. Sensation intact. A&Ox2  Psych:  Normal mood and affect. I have reviewed ordered lab tests and independently visualized imaging below:    Recent Labs:  Recent Results (from the past 48 hour(s))   CBC WITH AUTOMATED DIFF    Collection Time: 12/12/21  8:55 PM   Result Value Ref Range    WBC 11.4 (H) 4.3 - 11.1 K/uL    RBC 4.14 4.05 - 5.2 M/uL    HGB 12.3 11.7 - 15.4 g/dL    HCT 39.1 35.8 - 46.3 %    MCV 94.4 79.6 - 97.8 FL    MCH 29.7 26.1 - 32.9 PG    MCHC 31.5 31.4 - 35.0 g/dL    RDW 13.8 11.9 - 14.6 %    PLATELET 380 563 - 378 K/uL    MPV 11.2 9.4 - 12.3 FL    ABSOLUTE NRBC 0.00 0.0 - 0.2 K/uL    DF AUTOMATED      NEUTROPHILS 84 (H) 43 - 78 %    LYMPHOCYTES 8 (L) 13 - 44 %    MONOCYTES 5 4.0 - 12.0 %    EOSINOPHILS 2 0.5 - 7.8 %    BASOPHILS 0 0.0 - 2.0 %    IMMATURE GRANULOCYTES 0 0.0 - 5.0 %    ABS. NEUTROPHILS 9.6 (H) 1.7 - 8.2 K/UL    ABS. LYMPHOCYTES 0.9 0.5 - 4.6 K/UL    ABS. MONOCYTES 0.6 0.1 - 1.3 K/UL    ABS. EOSINOPHILS 0.3 0.0 - 0.8 K/UL    ABS. BASOPHILS 0.0 0.0 - 0.2 K/UL    ABS. IMM. GRANS. 0.0 0.0 - 0.5 K/UL   METABOLIC PANEL, BASIC    Collection Time: 12/12/21  8:55 PM   Result Value Ref Range    Sodium 138 136 - 145 mmol/L    Potassium 3.8 3.5 - 5.1 mmol/L    Chloride 103 98 - 107 mmol/L    CO2 28 21 - 32 mmol/L    Anion gap 7 7 - 16 mmol/L    Glucose 241 (H) 65 - 100 mg/dL    BUN 25 (H) 8 - 23 MG/DL    Creatinine 1.50 (H) 0.6 - 1.0 MG/DL    GFR est AA 42 (L) >60 ml/min/1.73m2    GFR est non-AA 35 (L) >60 ml/min/1.73m2    Calcium 9.1 8.3 - 10.4 MG/DL   LACTIC ACID    Collection Time: 12/12/21  8:55 PM   Result Value Ref Range    Lactic acid 1.5 0.4 - 2.0 MMOL/L   MAGNESIUM    Collection Time: 12/12/21  8:55 PM   Result Value Ref Range    Magnesium 1.7 (L) 1.8 - 2.4 mg/dL   POC URINE MACROSCOPIC    Collection Time: 12/12/21 11:22 PM   Result Value Ref Range    Spec. gravity (POC) >1.030 (H) 1.001 - 1.023    pH, urine  (POC) 5.5 5.0 - 9.0      Protein (POC) 100 (A) NEG mg/dL    Glucose, urine (POC) 100 (A) NEG mg/dL    Ketones (POC) Negative NEG mg/dL    Bilirubin (POC) Negative NEG      Blood (POC) LARGE (A) NEG      Urobilinogen (POC) 0.2 0.2 - 1.0 EU/dL    Nitrite (POC) Negative NEG      Leukocyte esterase (POC) TRACE (A) NEG     URINE MICROSCOPIC    Collection Time: 12/12/21 11:27 PM   Result Value Ref Range    WBC >100 (H) 0 /hpf    RBC 3-5 0 /hpf    Epithelial cells 0 0 /hpf    Bacteria 4+ (H) 0 /hpf    Casts 10-20 0 /lpf   CULTURE, URINE    Collection Time: 12/12/21 11:27 PM    Specimen: Clean catch; Urine   Result Value Ref Range    Special Requests: NO SPECIAL REQUESTS      Culture result:        NO GROWTH AFTER SHORT PERIOD OF INCUBATION. FURTHER RESULTS TO FOLLOW AFTER OVERNIGHT INCUBATION.    GLUCOSE, POC    Collection Time: 12/13/21  9:40 AM   Result Value Ref Range    Glucose (POC) 216 (H) 65 - 100 mg/dL    Performed by KateJULIANNE    GLUCOSE, POC    Collection Time: 12/13/21 11:45 AM   Result Value Ref Range    Glucose (POC) 145 (H) 65 - 100 mg/dL    Performed by KateJULIANNE All [employer] Results     Procedure Component Value Units Date/Time    CULTURE, URINE [[id number]] Collected: 12/12/21 2575    Order Status: Completed Specimen: Urine from Clean catch Updated: 12/13/21 0843     Special Requests: NO SPECIAL REQUESTS        Culture result:       NO GROWTH AFTER SHORT PERIOD OF INCUBATION. FURTHER RESULTS TO FOLLOW AFTER OVERNIGHT INCUBATION. Other Studies:  CT HEAD WO CONT    Result Date: 12/12/2021  EXAM: CT HEAD WO CONT HISTORY:  Fall. TECHNIQUE: Axial images of the brain were performed without the administration of intravenous contrast. Images were obtained axial plane and coronal reformatted images were submitted. Dose reduction technique used: Automated exposure control/Adjustment of the mA and/or kV according to patient size/Use of iterative reconstruction technique. COMPARISON: 11/15/2021 FINDINGS: There is no evidence of acute intracranial hemorrhage, midline shift, or mass effect. No intra or extra-axial fluid collections are observed. There are mild chronic appearing microangiopathic changes within the periventricular white matter. No evidence of acute confluent territorial infarction. Ventricles and basal cisterns are patent and symmetric. There is mild generalized volume loss. Significant atherosclerotic changes are appreciated. Visualized paranasal sinuses and mastoid air cells are without significant fluid. Scalp, soft tissues, and calvarium are within normal limits. 1. No CT evidence of acute intracranial process.        Current Meds:  Current Facility-Administered Medications   Medication Dose Route Frequency    sodium chloride (NS) flush 5-40 mL  5-40 mL IntraVENous Q8H    sodium chloride (NS) flush 5-40 mL  5-40 mL IntraVENous PRN    acetaminophen (TYLENOL) tablet 650 mg  650 mg Oral Q6H PRN    Or    acetaminophen (TYLENOL) suppository 650 mg  650 mg Rectal Q6H PRN    polyethylene glycol (MIRALAX) packet 17 g  17 g Oral DAILY PRN    ondansetron (ZOFRAN ODT) tablet 4 mg  4 mg Oral Q8H PRN    Or    ondansetron (ZOFRAN) injection 4 mg  4 mg IntraVENous Q6H PRN    0.9% sodium chloride infusion  50 mL/hr IntraVENous CONTINUOUS    insulin lispro (HUMALOG) injection   SubCUTAneous AC&HS    cefTRIAXone (ROCEPHIN) 1 g in 0.9% sodium chloride (MBP/ADV) 50 mL MBP  1 g IntraVENous Q24H    fenofibrate (LOFIBRA) tablet 160 mg  160 mg Oral DAILY    gabapentin (NEURONTIN) capsule 100 mg  100 mg Oral TID    glipiZIDE (GLUCOTROL) tablet 5 mg  5 mg Oral ACB&D    latanoprost (XALATAN) 0.005 % ophthalmic solution 1 Drop  1 Drop Both Eyes QHS    LORazepam (ATIVAN) tablet 0.5 mg  0.5 mg Oral Q8H PRN    pravastatin (PRAVACHOL) tablet 80 mg  80 mg Oral QHS    propranoloL (INDERAL) tablet 20 mg  20 mg Oral BID    apixaban (ELIQUIS) tablet 2.5 mg  2.5 mg Oral BID    tuberculin injection 5 Units  5 Units IntraDERMal ONCE       Signed:  Oliverio Ramirez DO    Part of this note may have been written by using a voice dictation software. The note has been proof read but may still contain some grammatical/other typographical errors.

## 2021-12-13 NOTE — ED PROVIDER NOTES
79-year-old lady presents with concerns about global weakness. She apparently had a hard time getting off the couch earlier today. Fire department was called because she could not get off the floor after she tried to get up to get to the restroom when she sort of just slid down to the floor. At that time she said EMS did not transport her because she did not think she needs to go to the hospital.  Several hours later a similar thing happened where the patient tried to get up to get to the bathroom but was too weak to successfully do that. EMS was called again and this time she agreed to be transported to the hospital.  She said she has not had any focal symptoms including no focal arm or leg weakness. She had no language or speech problems. She has had no fevers or chills and no nausea, vomiting, or diarrhea. She denies any cough or difficulty breathing. Family also reported that she has had multiple failed over the past couple of months. She did have a CT scan of her head in mid November. That time there was no obvious evidence of trauma. No other associated symptoms. Elements of this note were created using speech recognition software. As such, errors of speech recognition may be present. Past Medical History:   Diagnosis Date    Chronic kidney disease     renal insufficiency    Diabetes ([facility]     Diabetes ([facility] 11/13/2014    Hypertension     Obesity 7/26/2016    Psychiatric disorder     depeession       Past Surgical History:   Procedure Laterality Date    HX CHOLECYSTECTOMY           No family history on file.     Social History     Socioeconomic History    Marital status:      Spouse name: Not on file    Number of children: Not on file    Years of education: Not on file    Highest education level: Not on file   Occupational History    Not on file   Tobacco Use    Smoking status: Never Smoker    Smokeless tobacco: Not on file   Substance and Sexual Activity    Alcohol use: No    Drug use: Not on file    Sexual activity: Not on file   Other Topics Concern    Not on file   Social History Narrative    Not on file     Social Determinants of Health     Financial Resource Strain:     Difficulty of Paying Living Expenses: Not on file   Food Insecurity:     Worried About Running Out of Food in the Last Year: Not on file    Boni of Food in the Last Year: Not on file   Transportation Needs:     Lack of Transportation (Medical): Not on file    Lack of Transportation (Non-Medical): Not on file   Physical Activity:     Days of Exercise per Week: Not on file    Minutes of Exercise per Session: Not on file   Stress:     Feeling of Stress : Not on file   Social Connections:     Frequency of Communication with Friends and Family: Not on file    Frequency of Social Gatherings with Friends and Family: Not on file    Attends [employer] Services: Not on file    Active Member of [address] [place] [address] or Organizations: Not on file    Attends Club or Organization Meetings: Not on file    Marital Status: Not on file   Intimate Partner Violence:     Fear of Current or Ex-Partner: Not on file    Emotionally Abused: Not on file    Physically Abused: Not on file    Sexually Abused: Not on file   Housing Stability:     Unable to Pay for Housing in the Last Year: Not on file    Number of [place] in the Last Year: Not on file    Unstable Housing in the Last Year: Not on file         ALLERGIES: Ciprocinonide, Citalopram, Pioglitazone, Celecoxib, Codeine, Lisinopril, and Metronidazole    Review of Systems   Constitutional: Positive for fatigue. Negative for chills, diaphoresis and fever. HENT: Negative for congestion, rhinorrhea and sore throat. Eyes: Negative for redness and visual disturbance. Respiratory: Negative for cough, chest tightness, shortness of breath and wheezing. Cardiovascular: Negative for chest pain and palpitations.    Gastrointestinal: Negative for abdominal pain, blood in stool, diarrhea, nausea and vomiting. Endocrine: Negative for polydipsia and polyuria. Genitourinary: Negative for dysuria and hematuria. Musculoskeletal: Negative for arthralgias, myalgias and neck stiffness. Skin: Negative for rash. Allergic/Immunologic: Negative for environmental allergies and food allergies. Neurological: Negative for dizziness, weakness and headaches. Hematological: Negative for adenopathy. Does not bruise/bleed easily. Psychiatric/Behavioral: Negative for confusion and sleep disturbance. The patient is not nervous/anxious. Vitals:    12/12/21 2026   BP: 117/66   Pulse: 84   Resp: 19   Temp: 98.4 °F (36.9 °C)   SpO2: 96%   Weight: 70.3 kg (155 lb)   Height: 5' 1\" (1.549 m)            Physical Exam  Vitals and nursing note reviewed. Constitutional:       Appearance: Normal appearance. HENT:      Head: Normocephalic and atraumatic. Mouth/Throat:      Mouth: Mucous membranes are moist.   Eyes:      General:         Right eye: No discharge. Left eye: No discharge. Cardiovascular:      Rate and Rhythm: Normal rate. Pulses: Normal pulses. Pulmonary:      Effort: Pulmonary effort is normal. No respiratory distress. Breath sounds: Normal breath sounds. Abdominal:      General: Bowel sounds are normal.      Palpations: Abdomen is soft. Musculoskeletal:         General: No swelling or tenderness. Skin:     General: Skin is warm. Comments: Chronic changes in the lower legs consistent with venous stasis   Neurological:      General: No focal deficit present. Mental Status: She is alert and oriented to person, place, and time. Cranial Nerves: No cranial nerve deficit. Motor: No weakness.       Coordination: Coordination normal.          MDM  Number of Diagnoses or Management Options  Diagnosis management comments: Given her increasing weakness and multiple falls I will get a CT of her head to evaluate for chronic subdurals. I will also check her basic blood work and urine. ED Course as of 12/12/21 3576   Sun Dec 12, 2021   2349 Given the fact that the patient lives alone and is still too weak to be able to stand and walk adequately I think she would benefit from consideration of observation admission with antibiotics.  [AC]      ED Course User Index  [AC] Fabi Redmond MD       Procedures

## 2021-12-13 NOTE — PROGRESS NOTES
ACUTE PHYSICAL THERAPY GOALS:  (Developed with and agreed upon by patient and/or caregiver.)  (1.) Nasra Brady will move from supine to sit and sit to supine , scoot up and down and roll side to side with CONTACT GUARD ASSIST within 7 treatment day(s). (2.) Nasra Brady will transfer from bed to chair and chair to bed with CONTACT GUARD ASSIST using the least restrictive device within 7 treatment day(s). (3.) Nasra Brady will ambulate with CONTACT GUARD ASSIST for 250+ feet with the least restrictive device within 7 treatment day(s). (4.) Nasra Brady will perform standing static and dynamic balance activities x 25 minutes with CONTACT GUARD ASSIST to improve safety and activity tolerance within 7 treatment day(s). (5.) Nasra Brady will perform therapeutic exercises x 20 min for HEP with INDEPENDENCE to improve strength, endurance, and functional mobility within 7 treatment day(s).      PHYSICAL THERAPY ASSESSMENT: Initial Assessment PT Treatment Day # 1      Nasra Brady is a 81 y.o. female   PRIMARY DIAGNOSIS: UTI (urinary tract infection)  UTI (urinary tract infection) [N39.0]     Reason for Referral:  ICD-10: Treatment Diagnosis: Generalized Muscle Weakness (M62.81)  Other lack of cordination (R27.8)  Difficulty in walking, Not elsewhere classified (R26.2)  Other abnormalities of gait and mobility (R26.89)  Repeated Falls (R29.6)  History of falling (Z91.81)  OBS[place] / [place] / Product Type: Medicare /     ASSESSMENT:     REHAB RECOMMENDATIONS:   Recommendation to date pending progress:  Setting:   Short-term Rehab  Equipment:    To Be Determined     PRIOR LEVEL OF FUNCTION:  (Prior to Hospitalization) INITIAL/CURRENT LEVEL OF FUNCTION:  (Most Recently Demonstrated)   Bed Mobility:   Modified Independent  Sit to Stand:   Modified Independent  Transfers:   Modified Independent  Gait/Mobility:   Modified Independent Bed Mobility:   Moderate Assistance  Sit to Stand:   Moderate Assistance  Transfers:   Minimal Assistance  Gait/Mobility:   Minimal Assistance     ASSESSMENT:  Ms. Benjamin Fernandes is a very pleasant 81year old F who presents to hospital after a fall at home, increased weakness. Admitted with a UTI. This date pt performs mobility including bed mobility, sitting balance activities, sit <> stand transfers, standing balance activities, and ambulation in room with Min-Mod A, BUE support at . Pt weak and unsteady on her feet. Pt presents as functioning below her baseline, with deficits in mobility including transfers, gait, balance, and activity tolerance. Pt will benefit from skilled therapy services to address stated deficits to promote return to highest level of function, independence, and safety. Will continue to follow. SUBJECTIVE:   Ms. Benjamin Fernandes states, \"It feels good to be up out of bed. \"    SOCIAL HISTORY/LIVING ENVIRONMENT: PLOF lives alone, uses quad cane primarily for mobility, also has a standard walker. 1 level home, her typical use entrance has no steps, front porch has 2 steps to enter.   Home Environment: Independent living  One/Two Story Residence: One story  Living Alone: Yes  Support Systems: Other Family Member(s) ([facility])  OBJECTIVE:     PAIN: VITAL SIGNS: LINES/DRAINS:   Pre Treatment: Pain Screen  Pain Scale 1: Numeric (0 - 10)  Pain Intensity 1: 0  Post Treatment: 0/10 Vital Signs  O2 Device: None (Room air) IV and Purewick  O2 Device: None (Room air)     GROSS EVALUATION:  BLE Within Functional Limits Abnormal/ Functional Abnormal/ Non-Functional (see comments) Not Tested Comments:   AROM [x] [] [] []    PROM [x] [] [] []    Strength [] [x] [] []  generally weak   Balance [] [x] [] []  fair+ sitting, fair standing   Posture [x] [] [] []    Sensation [x] [] [] []    Coordination [x] [] [] []    Tone [] [] [] [x]    Edema [] [] [] [x]    Activity Tolerance [] [x] [] []     [] [] [] []      COGNITION/  PERCEPTION: Intact Impaired   (see comments) Comments:   Orientation [x] []    Vision [x] []    Hearing [x] []    Command Following [x] []    Safety Awareness [x] []     [] []      MOBILITY: I Mod I S SBA CGA Min Mod Max Total  NT x2 Comments:   Bed Mobility    Rolling [] [] [] [] [] [] [x] [] [] [] []    Supine to Sit [] [] [] [] [] [] [x] [] [] [] []    Scooting [] [] [] [] [] [] [x] [] [] [] []    Sit to Supine [] [] [] [] [] [] [x] [] [] [] []    Transfers    Sit to Stand [] [] [] [] [] [] [x] [] [] [] []    Bed to Chair [] [] [] [] [] [x] [] [] [] [] []    Stand to Sit [] [] [] [] [] [x] [] [] [] [] []    I=Independent, Mod I=Modified Independent, S=Supervision, SBA=Standby Assistance, CGA=Contact Guard Assistance,   Min=Minimal Assistance, Mod=Moderate Assistance, Max=Maximal Assistance, Total=Total Assistance, NT=Not Tested  GAIT: I Mod I S SBA CGA Min Mod Max Total  NT x2 Comments:   Level of Assistance [] [] [] [] [] [x] [] [] [] [] []    Distance 2 x 20'    DME Rolling Walker    Gait Quality Short, shuffled steps. Altered MARIANELA. Weightbearing Status N/A     I=Independent, Mod I=Modified Independent, S=Supervision, SBA=Standby Assistance, CGA=Contact Guard Assistance,   Min=Minimal Assistance, Mod=Moderate Assistance, Max=Maximal Assistance, Total=Total Assistance, NT=Not [facility]       How much difficulty does the patient currently have. .. Unable A Lot A Little None   1. Turning over in bed (including adjusting bedclothes, sheets and blankets)? [] 1   [] 2   [x] 3   [] 4   2. Sitting down on and standing up from a chair with arms ( e.g., wheelchair, bedside commode, etc.)   [] 1   [] 2   [x] 3   [] 4   3. Moving from lying on back to sitting on the side of the bed? [] 1   [] 2   [x] 3   [] 4   How much help from another person does the patient currently need. .. Total A Lot A Little None   4.   Moving to and from a bed to a chair (including a wheelchair)? [] 1   [] 2   [x] 3   [] 4   5. Need to walk in hospital room? [] 1   [] 2   [x] 3   [] 4   6. Climbing 3-5 steps with a railing? [] 1   [x] 2   [] 3   [] 4   © 2007, Trustees of [address] [place] [address] [address] [id number], under license to [employer]. All rights reserved     Score:  Initial: 17 Most Recent: X (Date: -- )    Interpretation of Tool:  Represents activities that are increasingly more difficult (i.e. Bed mobility, Transfers, Gait). PLAN:   FREQUENCY/DURATION: PT Plan of Care: 3 times/week for duration of hospital stay or until stated goals are met, whichever comes first.    PROBLEM LIST:   (Skilled intervention is medically necessary to address:)  1. Decreased Activity Tolerance  2. Decreased Balance  3. Decreased Coordination  4. Decreased Gait Ability  5. Decreased Strength  6. Decreased Transfer Abilities   INTERVENTIONS PLANNED:   (Benefits and precautions of physical therapy have been discussed with the patient.)  1. Therapeutic Activity  2. Therapeutic Exercise/HEP  3. Neuromuscular Re-education  4. Gait Training  5. Education     TREATMENT:     EVALUATION: Moderate Complexity : (Untimed Charge)    TREATMENT:   (     )  Therapeutic Activity (23 Minutes): Therapeutic activity included Rolling, Supine to Sit, Scooting, Lateral Scooting, Transfer Training, Ambulation on level ground, Sitting balance  and Standing balance to improve functional Mobility, Strength and Activity tolerance.     TREATMENT GRID:  N/A    AFTER TREATMENT POSITION/PRECAUTIONS:  Alarm Activated, Chair, Needs within reach and RN notified    INTERDISCIPLINARY COLLABORATION:  RN/PCT and PT/PTA    TOTAL TREATMENT DURATION:  PT Patient Time In/Time Out  Time In: 1373  Time Out: [address] [facility], PT

## 2021-12-13 NOTE — PROGRESS NOTES
Problem: Pressure Injury - Risk of  Goal: *Prevention of pressure injury  Description: Document Paul Scale and appropriate interventions in the flowsheet. Outcome: Progressing Towards Goal  Note: Pressure Injury Interventions:  Sensory Interventions: Assess changes in LOC, Assess need for specialty bed, Discuss PT/OT consult with provider, Minimize linen layers    Moisture Interventions: Absorbent underpads, Apply protective barrier, creams and emollients, Check for incontinence Q2 hours and as needed    Activity Interventions: PT/OT evaluation, Pressure redistribution bed/mattress(bed type)    Mobility Interventions: PT/OT evaluation    Nutrition Interventions: Document food/fluid/supplement intake    Problem: Falls - Risk of  Goal: *Absence of Falls  Description: Document Martín Fall Risk and appropriate interventions in the flowsheet.   Outcome: Progressing Towards Goal  Note: Fall Risk Interventions:  Mobility Interventions: Patient to call before getting OOB, PT Consult for mobility concerns, PT Consult for assist device competence, Bed/chair exit alarm, OT consult for ADLs    Elimination Interventions: Bed/chair exit alarm, Call light in reach, Patient to call for help with toileting needs    History of Falls Interventions: Bed/chair exit alarm, Investigate reason for fall, Room close to nurse's station

## 2021-12-13 NOTE — ED NOTES
TRANSFER - OUT REPORT:    Verbal report given to Mariya(name) on Maryanne Patiño  being transferred to [facility](unit) for routine progression of care       Report consisted of patients Situation, Background, Assessment and   Recommendations(SBAR). Information from the following report(s) ED Summary was reviewed with the receiving nurse. Lines:   Peripheral IV 12/12/21 Anterior; Left; Proximal Forearm (Active)        Opportunity for questions and clarification was provided.       Patient transported with:   [email or web address]

## 2021-12-13 NOTE — H&P
[facility] Hospitalist History and Physical       Name:  Collette Nurse  Age:86 y.o. Sex:female   :  1935    MRN:  [id number]   PCP:  Marnie Huang MD      Admit Date:  2021  8:21 PM   Chief Complaint: \"Pt arrives via [address] with a CC of fatigue. Fire department had been called earlier in the day due to pt sliding off of couch. Family reports weakness has increased throughout the day, called EMS when pt was unable to get off of couch again.      VSS en route. Arrives 117/66. Bgl 300, hx of diabetes.      Family reports 4 falls in the last 3 months. \"    Reason for Admission:   UTI (urinary tract infection) [N39.0]    Assessment & Plan: Active Problems:    UTI (urinary tract infection) (2021)      Generalized weakness  HTN  Chronic AFIB  Diabetes          PLAN:  1) Admit med bed OBS  2) Will continue IV abx for UTI as started in ER. Follow up urine cultures  3) Resume home meds, Add SSI. 4) PT consult for strength and gait stability  5) SW consult for discharge planning        Disposition/Expected LOS: 1-2  Diet: DIET ADULT  VTE ppx: SCD  GI ppx:   Code status: Full Code  Surrogate decision-maker:       History of Presenting Illness:     Collette Nurse is a 81 y.o. female with medical history of P-AFIB, DM2, HTN who presented to ED with concerns about global weakness. She apparently had a hard time getting off the couch earlier today. Fire department was called because she could not get off the floor after she tried to get up to get to the restroom. She states she sort of just slid down to the floor. At that time she said EMS did not transport her because she did not think she needs to go to the hospital.  Several hours later a similar thing happened where the patient tried to get up to get to the bathroom but was too weak to successfully do that.   EMS was called again and this time she agreed to be transported to the hospital.  She said she has not had any focal symptoms including no focal arm or leg weakness. She had no language or speech problems. She has had no fevers or chills and no nausea, vomiting, or diarrhea. She denies any cough or difficulty breathing.     Family also reported that she has had multiple falls over the past couple of months. She had an ER visit with CT scan of her head in mid November. That time there was no obvious evidence of trauma.     No other associated symptoms. Tonight, Workup in ER reveals UTI, no fever, mild leukocytosis, normal lactic acid. She had CT head shows nothing acute. Hospitalist asked to admit    Review of Systems:  A 14 point review of systems was taken and pertinent positive as per HPI.         Past Medical History:   Diagnosis Date    Chronic kidney disease     renal insufficiency    [facility]     Diabetes ([facility] 11/13/2014    Hypertension     Obesity 7/26/2016    Psychiatric disorder     depeession       Past Surgical History:   Procedure Laterality Date    HX CHOLECYSTECTOMY         Family History :  Daughter with Goiter  MGM with stroke  Mother with thyroid nodules  Sister with diabetes  Sister with goiter    Social History     Tobacco Use    Smoking status: Never Smoker    Smokeless tobacco: Not on file   Substance Use Topics    Alcohol use: No       Allergies   Allergen Reactions    Ciprocinonide Unknown (comments)     Cant remember    Citalopram Other (comments)     Cant remember    Pioglitazone Other (comments)     Cant remember    Celecoxib Nausea and Vomiting    Codeine Nausea and Vomiting    Lisinopril Cough    Metronidazole Nausea and Vomiting       Immunization History   Administered Date(s) Administered    COVID-19, PFIZER, MRNA, LNP-S, PF, 30MCG/0.3ML DOSE 02/12/2021    TB Skin Test (PPD) Intradermal 11/14/2014    Tdap 05/31/2015, 11/15/2021         PTA Medications:  Current Outpatient Medications   Medication Instructions    acetaminophen (TYLENOL) 650 mg, Oral, EVERY 4 HOURS AS NEEDED    apixaban (ELIQUIS) 5 mg, Oral, 2 TIMES DAILY    Ativan 0.5 mg, Oral, EVERY 8 HOURS AS NEEDED    calcium carbonate (OS-CHELSEY) 500 mg calcium (1,250 mg) tablet 1 Tablet, Oral, DAILY    Cholecalciferol, Vitamin D3, 50,000 unit cap EVERY 7 DAYS    fenofibrate (LOFIBRA) 160 mg, DAILY    gabapentin (NEURONTIN) 100 mg, 3 TIMES DAILY    glimepiride (AMARYL) 2 mg, Oral, 7AM    HYDROcodone-acetaminophen (NORCO) 7.5-325 mg per tablet 1 Tablet, Oral, EVERY 6 HOURS AS NEEDED    insulin glargine U-300 conc (TOUJEO SOLOSTAR U-300 INSULIN) 15 Units, SubCUTAneous, EVERY EVENING    insulin lispro (HUMALOG) 100 unit/mL injection For Blood Sugar of: 150-174 = 2 Units; 175-204 = 3 Units; 205-234 = 4 Units; 235-264 = 5 Units; 233-352 = 6 Units; 295-324 = 7 Units; 325-354 = 8 Units; 355 or > = Call MD    latanoprost (XALATAN) 0.005 % ophthalmic solution 1 Drop, EVERY BEDTIME    loratadine (CLARITIN) 10 mg, Oral    losartan (COZAAR) 25 mg, Oral, DAILY    pravastatin (PRAVACHOL) 80 mg, Oral, EVERY BEDTIME    propranoloL (INDERAL) 20 mg, 2 TIMES DAILY    sertraline (ZOLOFT) 200 mg, DAILY    Venlafaxine 150 mg tr24 1 Tablet, DAILY       Objective:     Patient Vitals for the past 24 hrs:   Temp Pulse Resp BP SpO2   12/13/21 0434 97.9 °F (36.6 °C) 75 18 (!) 132/50 94 %   12/13/21 0125 98 °F (36.7 °C) 80 18 (!) 143/41 95 %   12/13/21 0124 98 °F (36.7 °C) 80 18 (!) 138/39 95 %   12/12/21 2026 98.4 °F (36.9 °C) 84 19 117/66 96 %       Oxygen Therapy  O2 Sat (%): 94 % (12/13/21 0434)  Pulse via Oximetry: 83 beats per minute (12/12/21 2026)  O2 Device: None (Room air) (12/13/21 0132)    Body mass index is 27.99 kg/m².     Physical Exam:    General:  Alert and oriented x 3, No acute distress, speaking in full sentences  HEENT:  Pupils equal and reactive to light and accommodation, oropharynx is clear   Neck:   Supple, no lymphadenopathy, no JVD   Lungs:  Clear to auscultation bilaterally   CV:   Regular rate, regular rhythm, with normal S1 and S2   Abdomen:  Soft, nontender, nondistended, normoactive bowel sounds   Extremities:  No cyanosis clubbing or edema. Chronic venous stasis   Neuro:  Nonfocal, A&O x3   Psych:  Normal mood and affect       Data Reviewed: I have reviewed all labs, meds, and studies. Recent Results (from the past 24 hour(s))   CBC WITH AUTOMATED DIFF    Collection Time: 12/12/21  8:55 PM   Result Value Ref Range    WBC 11.4 (H) 4.3 - 11.1 K/uL    RBC 4.14 4.05 - 5.2 M/uL    HGB 12.3 11.7 - 15.4 g/dL    HCT 39.1 35.8 - 46.3 %    MCV 94.4 79.6 - 97.8 FL    MCH 29.7 26.1 - 32.9 PG    MCHC 31.5 31.4 - 35.0 g/dL    RDW 13.8 11.9 - 14.6 %    PLATELET 434 112 - 296 K/uL    MPV 11.2 9.4 - 12.3 FL    ABSOLUTE NRBC 0.00 0.0 - 0.2 K/uL    DF AUTOMATED      NEUTROPHILS 84 (H) 43 - 78 %    LYMPHOCYTES 8 (L) 13 - 44 %    MONOCYTES 5 4.0 - 12.0 %    EOSINOPHILS 2 0.5 - 7.8 %    BASOPHILS 0 0.0 - 2.0 %    IMMATURE GRANULOCYTES 0 0.0 - 5.0 %    ABS. NEUTROPHILS 9.6 (H) 1.7 - 8.2 K/UL    ABS. LYMPHOCYTES 0.9 0.5 - 4.6 K/UL    ABS. MONOCYTES 0.6 0.1 - 1.3 K/UL    ABS. EOSINOPHILS 0.3 0.0 - 0.8 K/UL    ABS. BASOPHILS 0.0 0.0 - 0.2 K/UL    ABS. IMM.  GRANS. 0.0 0.0 - 0.5 K/UL   METABOLIC PANEL, BASIC    Collection Time: 12/12/21  8:55 PM   Result Value Ref Range    Sodium 138 136 - 145 mmol/L    Potassium 3.8 3.5 - 5.1 mmol/L    Chloride 103 98 - 107 mmol/L    CO2 28 21 - 32 mmol/L    Anion gap 7 7 - 16 mmol/L    Glucose 241 (H) 65 - 100 mg/dL    BUN 25 (H) 8 - 23 MG/DL    Creatinine 1.50 (H) 0.6 - 1.0 MG/DL    GFR est AA 42 (L) >60 ml/min/1.73m2    GFR est non-AA 35 (L) >60 ml/min/1.73m2    Calcium 9.1 8.3 - 10.4 MG/DL   LACTIC ACID    Collection Time: 12/12/21  8:55 PM   Result Value Ref Range    Lactic acid 1.5 0.4 - 2.0 MMOL/L   MAGNESIUM    Collection Time: 12/12/21  8:55 PM   Result Value Ref Range    Magnesium 1.7 (L) 1.8 - 2.4 mg/dL   URINE MICROSCOPIC    Collection Time: 12/12/21 11:27 PM   Result Value Ref Range    WBC >100 (H) 0 /hpf    RBC 3-5 0 /hpf    Epithelial cells 0 0 /hpf    Bacteria 4+ (H) 0 /hpf    Casts 10-20 0 /lpf       EKG Results     Procedure 720 Value Units Date/Time    EKG, 12 LEAD, INITIAL [[id number]]     Order Status: Completed           All Micro Results     Procedure Component Value Units Date/Time    CULTURE, URINE [[id number]] Collected: 12/12/21 2320    Order Status: Completed Specimen: Urine from Clean catch Updated: 12/13/21 0339          Other Studies:  CT HEAD WO CONT    Result Date: 12/12/2021  EXAM: CT HEAD WO CONT HISTORY:  Fall. TECHNIQUE: Axial images of the brain were performed without the administration of intravenous contrast. Images were obtained axial plane and coronal reformatted images were submitted. Dose reduction technique used: Automated exposure control/Adjustment of the mA and/or kV according to patient size/Use of iterative reconstruction technique. COMPARISON: 11/15/2021 FINDINGS: There is no evidence of acute intracranial hemorrhage, midline shift, or mass effect. No intra or extra-axial fluid collections are observed. There are mild chronic appearing microangiopathic changes within the periventricular white matter. No evidence of acute confluent territorial infarction. Ventricles and basal cisterns are patent and symmetric. There is mild generalized volume loss. Significant atherosclerotic changes are appreciated. Visualized paranasal sinuses and mastoid air cells are without significant fluid. Scalp, soft tissues, and calvarium are within normal limits. 1. No CT evidence of acute intracranial process.          Medications:  Medications Administered      Medications Administered     cefTRIAXone (ROCEPHIN) 1 g in 0.9% sodium chloride (MBP/ADV) 50 mL MBP     Admin Date  12/12/2021 Action  New Bag Dose  1 g Rate  100 mL/hr Route  IntraVENous Administered By  Valarie Stewart RN                    Signed By: Mariely Donis MD   [facility] Hospitalist Service    December 13, 2021 12:06 AM

## 2021-12-13 NOTE — PROGRESS NOTES
Consult received. CM met with patient and family at bedside, to discuss discharge planning. Patient presented alert and oriented. Demographic information verified by the patient. Patient resides alone in a one story home with no steps at the main entrance. At baseline, patient reports she is independent with completing ADL's. Patient's two children Marlon Parikh [phone] and Мария Chaves [phone] assist with transportation needs and patients care. Patient's neighbor is also supportive with care needs and eats meals with patient during the week. Patient confirmed DME such as a quad cane and walker. Patient confirmed she uses cane daily. Diabetes: YES    Patient receives her prescription medications from [facility] from [facility] in [place]. PCP: Julio Cesar Narayanan MD     Discharge planning: PT/OT consults placed. Patient recommended for STR. Patient confirmed a history of [place] services and denies any history of STR. Patient is undecided, if she would like to obtain [facility] upon discharge. Patient to speak with family in further detail regarding STR placement to confirm discharge plan. Patient's son Marlon Parikh , present during discussion. CM to follow up. SNF List provided, if patient would like to pursue REHAB at discharge. CM continues to follow. Dispo- TBD    Care Management Interventions  PCP Verified by CM: Yes  Mode of Transport at Discharge: Other (see comment) (TBD: based upon need. )  Transition of Care Consult (CM Consult): Discharge Planning  Physical Therapy Consult: Yes  Occupational Therapy Consult: No  Speech Therapy Consult: No  Support Systems: Child(abdiaziz)  Confirm Follow Up Transport: Family  Name of the Patient Representative Who was Provided with a Choice of Provider and Agrees with the Discharge Plan: Patient/ Patient's son Marlon Parikh.    Resource Information Provided?: No  Discharge Location  Discharge Placement: Unable to determine at this time

## 2021-12-14 LAB
ANION GAP SERPL CALC-SCNC: 7 MMOL/L (ref 7–16)
BASOPHILS # BLD: 0 K/UL (ref 0–0.2)
BASOPHILS NFR BLD: 1 % (ref 0–2)
BUN SERPL-MCNC: 27 MG/DL (ref 8–23)
CALCIUM SERPL-MCNC: 8.6 MG/DL (ref 8.3–10.4)
CHLORIDE SERPL-SCNC: 109 MMOL/L (ref 98–107)
CO2 SERPL-SCNC: 27 MMOL/L (ref 21–32)
CREAT SERPL-MCNC: 1.27 MG/DL (ref 0.6–1)
DIFFERENTIAL METHOD BLD: ABNORMAL
EOSINOPHIL # BLD: 0.2 K/UL (ref 0–0.8)
EOSINOPHIL NFR BLD: 3 % (ref 0.5–7.8)
ERYTHROCYTE [DISTWIDTH] IN BLOOD BY AUTOMATED COUNT: 14 % (ref 11.9–14.6)
EST. AVERAGE GLUCOSE BLD GHB EST-MCNC: 151 MG/DL
GLUCOSE BLD STRIP.AUTO-MCNC: 131 MG/DL (ref 65–100)
GLUCOSE BLD STRIP.AUTO-MCNC: 149 MG/DL (ref 65–100)
GLUCOSE BLD STRIP.AUTO-MCNC: 186 MG/DL (ref 65–100)
GLUCOSE BLD STRIP.AUTO-MCNC: 196 MG/DL (ref 65–100)
GLUCOSE BLD STRIP.AUTO-MCNC: 60 MG/DL (ref 65–100)
GLUCOSE SERPL-MCNC: 61 MG/DL (ref 65–100)
HBA1C MFR BLD: 6.9 % (ref 4.2–6.3)
HCT VFR BLD AUTO: 35.8 % (ref 35.8–46.3)
HGB BLD-MCNC: 11 G/DL (ref 11.7–15.4)
IMM GRANULOCYTES # BLD AUTO: 0 K/UL (ref 0–0.5)
IMM GRANULOCYTES NFR BLD AUTO: 0 % (ref 0–5)
LYMPHOCYTES # BLD: 1.5 K/UL (ref 0.5–4.6)
LYMPHOCYTES NFR BLD: 25 % (ref 13–44)
MCH RBC QN AUTO: 29.8 PG (ref 26.1–32.9)
MCHC RBC AUTO-ENTMCNC: 30.7 G/DL (ref 31.4–35)
MCV RBC AUTO: 97 FL (ref 79.6–97.8)
MM INDURATION POC: 0 MM (ref 0–5)
MONOCYTES # BLD: 0.4 K/UL (ref 0.1–1.3)
MONOCYTES NFR BLD: 7 % (ref 4–12)
NEUTS SEG # BLD: 3.9 K/UL (ref 1.7–8.2)
NEUTS SEG NFR BLD: 65 % (ref 43–78)
NRBC # BLD: 0 K/UL (ref 0–0.2)
PLATELET # BLD AUTO: 159 K/UL (ref 150–450)
PMV BLD AUTO: 11.2 FL (ref 9.4–12.3)
POTASSIUM SERPL-SCNC: 3.8 MMOL/L (ref 3.5–5.1)
PPD POC: NEGATIVE NEGATIVE
RBC # BLD AUTO: 3.69 M/UL (ref 4.05–5.2)
SERVICE CMNT-IMP: ABNORMAL
SODIUM SERPL-SCNC: 143 MMOL/L (ref 136–145)
WBC # BLD AUTO: 6.1 K/UL (ref 4.3–11.1)

## 2021-12-14 PROCEDURE — 65270000029 HC RM PRIVATE

## 2021-12-14 PROCEDURE — 2709999900 HC NON-CHARGEABLE SUPPLY

## 2021-12-14 PROCEDURE — 80048 BASIC METABOLIC PNL TOTAL CA: CPT

## 2021-12-14 PROCEDURE — 74011000258 HC RX REV CODE- 258: Performed by: HOSPITALIST

## 2021-12-14 PROCEDURE — 74011250636 HC RX REV CODE- 250/636: Performed by: HOSPITALIST

## 2021-12-14 PROCEDURE — 74011250637 HC RX REV CODE- 250/637: Performed by: HOSPITALIST

## 2021-12-14 PROCEDURE — G0378 HOSPITAL OBSERVATION PER HR: HCPCS

## 2021-12-14 PROCEDURE — 82962 GLUCOSE BLOOD TEST: CPT

## 2021-12-14 PROCEDURE — 36415 COLL VENOUS BLD VENIPUNCTURE: CPT

## 2021-12-14 PROCEDURE — 74011636637 HC RX REV CODE- 636/637: Performed by: HOSPITALIST

## 2021-12-14 PROCEDURE — 85025 COMPLETE CBC W/AUTO DIFF WBC: CPT

## 2021-12-14 PROCEDURE — 83036 HEMOGLOBIN GLYCOSYLATED A1C: CPT

## 2021-12-14 RX ADMIN — FENOFIBRATE 160 MG: 160 TABLET ORAL at 08:34

## 2021-12-14 RX ADMIN — PROPRANOLOL HYDROCHLORIDE 20 MG: 10 TABLET ORAL at 08:36

## 2021-12-14 RX ADMIN — Medication 10 ML: at 21:59

## 2021-12-14 RX ADMIN — Medication 10 ML: at 05:10

## 2021-12-14 RX ADMIN — GABAPENTIN 100 MG: 100 CAPSULE ORAL at 08:34

## 2021-12-14 RX ADMIN — APIXABAN 2.5 MG: 2.5 TABLET, FILM COATED ORAL at 08:34

## 2021-12-14 RX ADMIN — Medication 10 ML: at 13:42

## 2021-12-14 RX ADMIN — INSULIN LISPRO 3 UNITS: 100 INJECTION, SOLUTION INTRAVENOUS; SUBCUTANEOUS at 17:49

## 2021-12-14 RX ADMIN — PRAVASTATIN SODIUM 80 MG: 20 TABLET ORAL at 21:58

## 2021-12-14 RX ADMIN — LATANOPROST 1 DROP: 50 SOLUTION OPHTHALMIC at 21:59

## 2021-12-14 RX ADMIN — PROPRANOLOL HYDROCHLORIDE 20 MG: 10 TABLET ORAL at 17:50

## 2021-12-14 RX ADMIN — GABAPENTIN 100 MG: 100 CAPSULE ORAL at 21:58

## 2021-12-14 RX ADMIN — INSULIN LISPRO 3 UNITS: 100 INJECTION, SOLUTION INTRAVENOUS; SUBCUTANEOUS at 21:59

## 2021-12-14 RX ADMIN — GABAPENTIN 100 MG: 100 CAPSULE ORAL at 17:50

## 2021-12-14 RX ADMIN — APIXABAN 2.5 MG: 2.5 TABLET, FILM COATED ORAL at 21:57

## 2021-12-14 RX ADMIN — CEFTRIAXONE 1 G: 1 INJECTION, POWDER, FOR SOLUTION INTRAMUSCULAR; INTRAVENOUS at 22:00

## 2021-12-14 NOTE — PROGRESS NOTES
Patient's family has requested referral be placed with [facility]. Referral sent. Patient has been accepted to [facility] for [facility]. SNF selected. Patient to tentatively discharge to facility 12/15. Rapid COVID test is needed for SNF admission. No additional CM needs identified or voiced at this time. CM continues to follow.

## 2021-12-14 NOTE — PROGRESS NOTES
Physician Progress Note      Felicita Cain  CSN #:                  [id number]  :                       1935  ADMIT DATE:       2021 8:21 PM  [address] [place] DATE:  RESPONDING  PROVIDER #:        Arnoldo NOLEN DO        QUERY TEXT:    Stage of Chronic Kidney Disease: Please provide further specificity, if known. Clinical indicators include:  Options provided:  -- Chronic kidney disease stage 1  -- Chronic kidney disease stage 2  -- Chronic kidney disease stage 3  -- Chronic kidney disease stage 3a  -- Chronic kidney disease stage 3b  -- Chronic kidney disease stage 4  -- Chronic kidney disease stage 5  -- Chronic kidney disease stage 5, requiring dialysis  -- End stage renal disease  -- Other - I will add my own diagnosis  -- Disagree - Not applicable / Not valid  -- Disagree - Clinically Unable to determine / Unknown        PROVIDER RESPONSE TEXT:    The patient has chronic kidney disease stage 3. QUERY TEXT:    Patient admitted with UTI, noted to have chronic  atrial fibrillation and is maintained on Eliquis. If possible, please document in progress notes and discharge summary if you are evaluating and/or treating any of the following: The medical record reflects the following:  Risk Factors: afib  Clinical Indicators: 86yof with history of HTN and  DM. Treatment: Eliquis  Options provided:  -- Secondary hypercoagulable state in a patient with atrial fibrillation  -- Other - I will add my own diagnosis  -- Disagree - Not applicable / Not valid  -- Disagree - Clinically unable to determine / Unknown  -- Refer to Clinical Documentation Reviewer    PROVIDER RESPONSE TEXT:    This patient has secondary hypercoagulable state related to atrial fibrillation.     Query created by: Malachi Harvey on 2021 10:28 AM      Electronically signed by:  Tita Wheatley DO 2021 12:39 PM

## 2021-12-14 NOTE — PROGRESS NOTES
Assumed [id number]. A/o, VSS. No new complaints at this time. Bed locked and low. Call bell, phone, and Tv remote within reach. Safety precautions in place. Hourly rounding completed on shift. All needs met. Will give report to oncoming nurse.

## 2021-12-15 VITALS
BODY MASS INDEX: 28.11 KG/M2 | HEIGHT: 61 IN | WEIGHT: 148.9 LBS | OXYGEN SATURATION: 92 % | RESPIRATION RATE: 18 BRPM | TEMPERATURE: 98.1 F | DIASTOLIC BLOOD PRESSURE: 77 MMHG | SYSTOLIC BLOOD PRESSURE: 158 MMHG | HEART RATE: 64 BPM

## 2021-12-15 LAB
COVID-19 RAPID TEST, COVR: NOT DETECTED
GLUCOSE BLD STRIP.AUTO-MCNC: 136 MG/DL (ref 65–100)
GLUCOSE BLD STRIP.AUTO-MCNC: 140 MG/DL (ref 65–100)
MM INDURATION POC: 0 MM (ref 0–5)
PPD POC: NEGATIVE NEGATIVE
SERVICE CMNT-IMP: ABNORMAL
SERVICE CMNT-IMP: ABNORMAL
SOURCE, COVRS: NORMAL

## 2021-12-15 PROCEDURE — 82962 GLUCOSE BLOOD TEST: CPT

## 2021-12-15 PROCEDURE — 87635 SARS-COV-2 COVID-19 AMP PRB: CPT

## 2021-12-15 PROCEDURE — 74011250637 HC RX REV CODE- 250/637: Performed by: HOSPITALIST

## 2021-12-15 RX ORDER — LORAZEPAM 0.5 MG/1
0.5 TABLET ORAL
Qty: 9 TABLET | Refills: 0 | Status: SHIPPED | OUTPATIENT
Start: 2021-12-15 | End: 2021-12-18

## 2021-12-15 RX ORDER — CEFPODOXIME PROXETIL 200 MG/1
200 TABLET, FILM COATED ORAL 2 TIMES DAILY
Qty: 8 TABLET | Refills: 0 | Status: SHIPPED | OUTPATIENT
Start: 2021-12-15 | End: 2021-12-19

## 2021-12-15 RX ORDER — HYDROCODONE BITARTRATE AND ACETAMINOPHEN 7.5; 325 MG/1; MG/1
1 TABLET ORAL
Qty: 20 TABLET | Refills: 0 | Status: SHIPPED | OUTPATIENT
Start: 2021-12-15 | End: 2021-12-18

## 2021-12-15 RX ADMIN — PROPRANOLOL HYDROCHLORIDE 20 MG: 10 TABLET ORAL at 08:38

## 2021-12-15 RX ADMIN — FENOFIBRATE 160 MG: 160 TABLET ORAL at 08:38

## 2021-12-15 RX ADMIN — GABAPENTIN 100 MG: 100 CAPSULE ORAL at 08:38

## 2021-12-15 RX ADMIN — APIXABAN 2.5 MG: 2.5 TABLET, FILM COATED ORAL at 08:38

## 2021-12-15 RX ADMIN — Medication 5 ML: at 06:00

## 2021-12-15 NOTE — PROGRESS NOTES
Assisted pt with getting dressed to go to [facility]. All pt belongings packed and sent with pt. Pt denies needs at this time. All needs met at this time.

## 2021-12-15 NOTE — PROGRESS NOTES
Problem: Pressure Injury - Risk of  Goal: *Prevention of pressure injury  Description: Document Paul Scale and appropriate interventions in the flowsheet. Outcome: Progressing Towards Goal  Note: Pressure Injury Interventions:  Sensory Interventions: Assess changes in LOC,Assess need for specialty bed,Avoid rigorous massage over bony prominences,Chair cushion,Check visual cues for pain,Discuss PT/OT consult with provider,Float heels,Keep linens dry and wrinkle-free,Maintain/enhance activity level,Minimize linen layers,Pad between skin to skin,Monitor skin under medical devices,Pressure redistribution bed/mattress (bed type),Sit a 90-degree angle/use footstool if needed,Turn and reposition approx. every two hours (pillows and wedges if needed),Use 30-degree side-lying position    Moisture Interventions: Absorbent underpads,Apply protective barrier, creams and emollients,Internal/External urinary devices,Check for incontinence Q2 hours and as needed    Activity Interventions: PT/OT evaluation    Mobility Interventions: PT/OT evaluation,Pressure redistribution bed/mattress (bed type)    Nutrition Interventions: Document food/fluid/supplement intake    Friction and Shear Interventions: Apply protective barrier, creams and emollients,Feet elevated on foot rest,Foam dressings/transparent film/skin sealants,HOB 30 degrees or less,Lift sheet,Lift team/patient mobility team,Minimize layers,Sit at 90-degree angle                Problem: Falls - Risk of  Goal: *Absence of Falls  Description: Document Martín Fall Risk and appropriate interventions in the flowsheet.   Outcome: Progressing Towards Goal  Note: Fall Risk Interventions:  Mobility Interventions: OT consult for ADLs,Patient to call before getting OOB,PT Consult for mobility concerns,PT Consult for assist device competence,Bed/chair exit alarm         Medication Interventions: Bed/chair exit alarm,Patient to call before getting OOB    Elimination Interventions: Call light in reach,Bed/chair exit alarm,Patient to call for help with toileting needs    History of Falls Interventions: Bed/chair exit alarm,Door open when patient unattended

## 2021-12-15 NOTE — PROGRESS NOTES
Patient is medically stable for discharge. Patient to discharge to [facility]. Patient's room number is 327 and report line is [phone].  time has been scheduled for 1300. Information provided to primary nurse Nestor. Family aware of discharge. No additional discharge needs voiced or identified at this time. CM remains available. Care Management Interventions  PCP Verified by CM: Yes  Mode of Transport at Discharge: BLS  Transition of Care Consult (CM Consult): Discharge Planning,SNF  Partner SNF: Yes  Discharge Durable Medical Equipment: No  Physical Therapy Consult: Yes  Occupational Therapy Consult: No  Speech Therapy Consult: No  Support Systems: Child(abdiaziz)  Confirm Follow Up Transport: Family  The Plan for Transition of Care is Related to the Following Treatment Goals : obtain STR to strengthen physical mobility and return to prior level of functioning. The Patient and/or Patient Representative was Provided with a Choice of Provider and Agrees with the Discharge Plan?: Yes  Name of the Patient Representative Who was Provided with a Choice of Provider and Agrees with the Discharge Plan: Patient/ Patient's son Minus Dillan.   [place] of Choice List was Provided with Basic Dialogue that Supports the Patient's Individualized Plan of Care/Goals, Treatment Preferences and Shares the Quality Data Associated with the Providers?: Yes  The [employer] Information Provided?: No  Discharge Location  Discharge Placement: Skilled nursing facility ([facility]. )

## 2021-12-15 NOTE — DISCHARGE SUMMARY
Hospitalist Discharge Summary   Admit Date:  2021  8:21 PM   DC Note date: 12/15/2021  Name:  Fabiola Stone   Age:  81 y.o. Sex:  female  :  1935   MRN:  [id number]   Room:  [facility]  PCP:  Junior Darby MD    Presenting Complaint: Fatigue    Initial Admission Diagnosis: UTI (urinary tract infection) [N39.0]     Problem List for this Hospitalization:  Hospital Problems as of 12/15/2021 Never Reviewed          Codes Class Noted - Resolved POA    * (Principal) UTI (urinary tract infection) ICD-10-CM: N39.0  ICD-9-CM: 599.0  2021 - Present Unknown        Scalp hematoma ICD-10-CM: S00. 03XA  ICD-9-CM: 643  2021 - Present Unknown        Thyroid enlargement ICD-10-CM: E04.9  ICD-9-CM: 240.9  2021 - Present Unknown        Essential hypertension with goal blood pressure less than 140/90 ICD-10-CM: I10  ICD-9-CM: 401.9  2016 - Present Yes        Atrial fibrillation with RVR (HCC) ICD-10-CM: I48.91  ICD-9-CM: 427.31  2014 - Present Yes        Diabetes ([facility] [address] ICD-10-CM: E11.9  ICD-9-CM: 250.00  2014 - Present Yes            Did Patient have Sepsis (YES OR NO): no    Hospital Course:    Dana Moore a 81 y. o. female with medical history of P-AFIB, DM2, HTN who presented to [address] [place] [address] concerns about global weakness.  She apparently had a hard time getting off the couch earlier today.  Fire department was called because she could not get off the floor after she tried to get up to get to the restroom.  She states she sort of just slid down to the floor.  At that time she said EMS did not transport her because she did not think she needs to go to the hospital.  Several hours later a similar thing happened where the patient tried to get up to get to the bathroom but was too weak to successfully do that.  EMS was called again and this time she agreed to be transported to the hospital. Agata Lennert said she has not had any focal symptoms including no focal arm or leg weakness.  She had no language or speech problems. Lela Cardona has had no fevers or chills and no nausea, vomiting, or diarrhea.  She denies any cough or difficulty breathing.     Family also reported that she has had multiple falls over the past couple of months.  She had an ER visit with CT scan of her head in mid November.  That time there was no obvious evidence of trauma.     No other associated symptoms.     Tonight, Workup in ER reveals UTI, no fever, mild leukocytosis, normal lactic acid. She had CT head shows nothing acute.     Hospitalist asked to admit. Patient treated with Rocephin. Clinically improved. Is back to baseline mentation (which is very mild memory loss). Will continue vantin at discharge to complete course (urine culture resulted as \"mixed van\")    Disposition: Rehab Facility  Diet: ADULT DIET Regular; 4 carb choices (60 gm/meal)  Code Status: Full Code    Follow Up Orders: Follow-up Appointments   Procedures    FOLLOW UP VISIT Appointment in: One Week 1 week PCP     1 week PCP     Standing Status:   Standing     Number of Occurrences:   1     Order Specific Question:   Appointment in     Answer: One Week       Follow-up Information     Follow up With Specialties Details Why Contact Info    [address] [place] [id number]  [address] [place] [address], [address] [place] [address]  [phone]            Follow up labs/diagnostics (ultimately defer to outpatient provider):  1 week. Time spent in patient discharge and coordination 35 minutes. Plan was discussed with patient, Daughter, CM, RN. All questions answered. Patient was stable at time of discharge. Instructions given to call a physician or return if any concerns.     Discharge Info:   Current Discharge Medication List      START taking these medications    Details   cefpodoxime (VANTIN) 200 mg tablet Take 1 Tablet by mouth two (2) times a day for 4 days. Qty: 8 Tablet, Refills: 0  Start date: 12/15/2021, End date: 12/19/2021         CONTINUE these medications which have CHANGED    Details   apixaban (ELIQUIS) 2.5 mg tablet Take 1 Tablet by mouth two (2) times a day. Qty: 60 Tablet, Refills: 0  Start date: 12/15/2021         CONTINUE these medications which have NOT CHANGED    Details   LORazepam (Ativan) 0.5 mg tablet Take 0.5 mg by mouth every eight (8) hours as needed for Anxiety. calcium carbonate (OS-CHELSEY) 500 mg calcium (1,250 mg) tablet Take 1 Tab by mouth daily. loratadine (CLARITIN) 10 mg tablet Take 10 mg by mouth.      pravastatin (PRAVACHOL) 80 mg tablet Take 80 mg by mouth nightly. acetaminophen (TYLENOL) 325 mg tablet Take 2 tablets by mouth every four (4) hours as needed. Qty: 30 tablet, Refills: 0      propranolol (INDERAL) 20 mg tablet Take 20 mg by mouth two (2) times a day. Cholecalciferol, Vitamin D3, 50,000 unit cap Take  by mouth every seven (7) days. fenofibrate (TRICOR) 160 mg tablet Take 160 mg by mouth daily. gabapentin (NEURONTIN) 100 mg capsule Take 100 mg by mouth three (3) times daily. losartan (COZAAR) 25 mg tablet Take 1 Tab by mouth daily. Indications: HYPERTENSION WITH LEFT VENTRICULAR HYPERTROPHY  Qty: 30 Tab, Refills: 11      HYDROcodone-acetaminophen (NORCO) 7.5-325 mg per tablet Take 1 Tab by mouth every six (6) hours as needed for Pain. Max Daily Amount: 4 Tabs. Qty: 20 Tab, Refills: 0      Venlafaxine 150 mg tr24 Take 1 tablet by mouth daily. sertraline (ZOLOFT) 100 mg tablet Take 200 mg by mouth daily. latanoprost (XALATAN) 0.005 % ophthalmic solution Administer 1 drop to both eyes nightly.          STOP taking these medications       glimepiride (AMARYL) 2 mg tablet Comments:   Reason for Stopping:         insulin glargine (TOUJEO SOLOSTAR) 300 unit/mL (1.5 mL) in Comments:   Reason for Stopping:         insulin lispro (HUMALOG) 100 unit/mL injection Comments:   Reason for Stopping:               Procedures done this admission:  * No surgery found *    Consults this admission:  None    Echocardiogram/EKG results:  No results found for this or any previous visit. EKG Results     Procedure 720 Value Units Date/Time    EKG, 12 LEAD, INITIAL [[id number]]     Order Status: Completed           Diagnostic Imaging/Tests:   CT HEAD WO CONT    Result Date: 12/12/2021  EXAM: CT HEAD WO CONT HISTORY:  Fall. TECHNIQUE: Axial images of the brain were performed without the administration of intravenous contrast. Images were obtained axial plane and coronal reformatted images were submitted. Dose reduction technique used: Automated exposure control/Adjustment of the mA and/or kV according to patient size/Use of iterative reconstruction technique. COMPARISON: 11/15/2021 FINDINGS: There is no evidence of acute intracranial hemorrhage, midline shift, or mass effect. No intra or extra-axial fluid collections are observed. There are mild chronic appearing microangiopathic changes within the periventricular white matter. No evidence of acute confluent territorial infarction. Ventricles and basal cisterns are patent and symmetric. There is mild generalized volume loss. Significant atherosclerotic changes are appreciated. Visualized paranasal sinuses and mastoid air cells are without significant fluid. Scalp, soft tissues, and calvarium are within normal limits. 1. No CT evidence of acute intracranial process.        All Micro Results     Procedure Component Value Units Date/Time    COVID-19 RAPID TEST [[id number]]     Order Status: Sent     CULTURE, URINE [[id number]]  (Abnormal) Collected: 12/12/21 4234    Order Status: Completed Specimen: Urine from Clean catch Updated: 12/15/21 4193     Special Requests: NO SPECIAL REQUESTS        Culture result:       >100,000 COLONIES/mL GRAM NEGATIVE RODS IDENTIFICATION AND SUSCEPTIBILITY TO FOLLOW 10,000 to 50,000 COLONIES/mL MIXED SKIN PATRICIA ISOLATED                Labs: Results:       BMP, Mg, Phos Recent Labs     12/14/21  0706 12/12/21 2055    138   K 3.8 3.8   * 103   CO2 27 28   AGAP 7 7   BUN 27* 25*   CREA 1.27* 1.50*   CA 8.6 9.1   GLU 61* 241*   MG  --  1.7*      CBC Recent Labs     12/14/21 0706 12/12/21 2055   WBC 6.1 11.4*   RBC 3.69* 4.14   HGB 11.0* 12.3   HCT 35.8 39.1    177   GRANS 65 84*   LYMPH 25 8*   EOS 3 2   MONOS 7 5   BASOS 1 0   IG 0 0   ANEU 3.9 9.6*   ABL 1.5 0.9   ARPIT 0.2 0.3   ABM 0.4 0.6   ABB 0.0 0.0   AIG 0.0 0.0      LFT No results for input(s): ALT, TBIL, AP, TP, ALB, GLOB, AGRAT in the last 72 hours.     No lab exists for component: SGOT, GPT   Cardiac Testing Lab Results   Component Value Date/Time    BNP 97 11/13/2014 02:40 PM     03/23/2016 06:16 PM    CK 23 12/01/2014 05:50 AM    CK 44 11/24/2014 07:20 AM    CK 44 11/24/2014 07:20 AM    CK - MB 1.3 11/24/2014 07:20 AM    CK - MB 1.7 11/24/2014 07:20 AM    CK - MB 1.7 11/23/2014 07:12 PM    CK-MB Index 3.0 (H) 11/24/2014 07:20 AM    CK-MB Index 3.9 (H) 11/24/2014 07:20 AM    CK-MB Index 4.7 (H) 11/23/2014 07:12 PM    Troponin-I, Qt. <0.02 (L) 03/22/2016 01:56 PM    Troponin-I, Qt. <0.02 (L) 11/24/2014 07:20 AM    Troponin-I, Qt. <0.02 (L) 11/24/2014 07:20 AM      Coagulation Tests Lab Results   Component Value Date/Time    Prothrombin time 10.7 11/17/2014 01:12 PM    Prothrombin time 12.6 (H) 11/13/2014 02:40 PM    INR 1.0 11/17/2014 01:12 PM    INR 1.2 11/13/2014 02:40 PM    aPTT 28.9 11/13/2014 02:40 PM      A1c Lab Results   Component Value Date/Time    Hemoglobin A1c 6.9 (H) 12/14/2021 09:55 AM    Hemoglobin A1c 6.0 11/19/2014 07:30 PM      Lipid Panel No results found for: CHOL, CHOLPOCT, CHOLX, CHLST, CHOLV, 549472, HDL, HDLP, LDL, LDLC, DLDLP, 612642, VLDLC, VLDL, TGLX, TRIGL, TRIGP, TGLPOCT, CHHD, CHHDX   Thyroid Panel Lab Results   Component Value Date/Time    TSH 5.450 (H) 08/18/2021 12:18 AM    TSH 3.560 03/23/2016 06:16 PM    T4, Total 6.7 11/19/2014 07:30 PM        Most Recent UA Lab Results   Component Value Date/Time    Color YELLOW 03/23/2016 08:10 AM    Appearance CLEAR 03/23/2016 08:10 AM    Specific gravity 1.010 03/23/2016 08:10 AM    pH (UA) 5.0 03/23/2016 08:10 AM    Protein NEGATIVE  03/23/2016 08:10 AM    Glucose NEGATIVE  03/23/2016 08:10 AM    Ketone NEGATIVE  03/23/2016 08:10 AM    Bilirubin NEGATIVE  03/23/2016 08:10 AM    Blood NEGATIVE  03/23/2016 08:10 AM    Urobilinogen 0.2 03/23/2016 08:10 AM    Nitrites NEGATIVE  03/23/2016 08:10 AM    Leukocyte Esterase TRACE (A) 03/23/2016 08:10 AM    WBC >100 (H) 12/12/2021 11:27 PM    RBC 3-5 12/12/2021 11:27 PM    Epithelial cells 0 12/12/2021 11:27 PM    Bacteria 4+ (H) 12/12/2021 11:27 PM    Casts 10-20 12/12/2021 11:27 PM          All Labs from Last 24 Hrs:  Recent Results (from the past 24 hour(s))   HEMOGLOBIN A1C WITH EAG    Collection Time: 12/14/21  9:55 AM   Result Value Ref Range    Hemoglobin A1c 6.9 (H) 4.20 - 6.30 %    Est. average glucose 151 mg/dL   PLEASE READ & DOCUMENT PPD TEST IN 24 HRS    Collection Time: 12/14/21 10:45 AM   Result Value Ref Range    PPD Negative Negative    mm Induration 0 0 - 5 mm   GLUCOSE, POC    Collection Time: 12/14/21 11:38 AM   Result Value Ref Range    Glucose (POC) 149 (H) 65 - 100 mg/dL    Performed by SitaCT    GLUCOSE, POC    Collection Time: 12/14/21  4:16 PM   Result Value Ref Range    Glucose (POC) 196 (H) 65 - 100 mg/dL    Performed by Dione Te [address], POC    Collection Time: 12/14/21  8:57 PM   Result Value Ref Range    Glucose (POC) 186 (H) 65 - 100 mg/dL    Performed by Gale Najera    GLUCOSE, POC    Collection Time: 12/15/21  8:12 AM   Result Value Ref Range    Glucose (POC) 136 (H) 65 - 100 mg/dL    Performed by Jackelyn        Current Med List in Hospital:   Current Facility-Administered Medications   Medication Dose Route Frequency    sodium chloride (NS) flush 5-40 mL  5-40 mL IntraVENous Q8H    sodium chloride (NS) flush 5-40 mL  5-40 mL IntraVENous PRN    acetaminophen (TYLENOL) tablet 650 mg  650 mg Oral Q6H PRN    Or    acetaminophen (TYLENOL) suppository 650 mg  650 mg Rectal Q6H PRN    polyethylene glycol (MIRALAX) packet 17 g  17 g Oral DAILY PRN    ondansetron (ZOFRAN ODT) tablet 4 mg  4 mg Oral Q8H PRN    Or    ondansetron (ZOFRAN) injection 4 mg  4 mg IntraVENous Q6H PRN    insulin lispro (HUMALOG) injection   SubCUTAneous AC&HS    cefTRIAXone (ROCEPHIN) 1 g in 0.9% sodium chloride (MBP/ADV) 50 mL MBP  1 g IntraVENous Q24H    fenofibrate (LOFIBRA) tablet 160 mg  160 mg Oral DAILY    gabapentin (NEURONTIN) capsule 100 mg  100 mg Oral TID    [Held by provider] glipiZIDE (GLUCOTROL) tablet 5 mg  5 mg Oral ACB&D    latanoprost (XALATAN) 0.005 % ophthalmic solution 1 Drop  1 Drop Both Eyes QHS    LORazepam (ATIVAN) tablet 0.5 mg  0.5 mg Oral Q8H PRN    pravastatin (PRAVACHOL) tablet 80 mg  80 mg Oral QHS    propranoloL (INDERAL) tablet 20 mg  20 mg Oral BID    apixaban (ELIQUIS) tablet 2.5 mg  2.5 mg Oral BID       Allergies   Allergen Reactions    Ciprocinonide Unknown (comments)     Cant remember    Citalopram Other (comments)     Cant remember    Pioglitazone Other (comments)     Cant remember    Celecoxib Nausea and Vomiting    Codeine Nausea and Vomiting    Lisinopril Cough    Metronidazole Nausea and Vomiting     Immunization History   Administered Date(s) Administered    COVID-19, PFIZER, MRNA, LNP-S, PF, 30MCG/0.3ML DOSE 02/12/2021    TB Skin Test (PPD) Intradermal 11/14/2014, 12/13/2021    Tdap 05/31/2015, 11/15/2021       Recent Vital Data:  Patient Vitals for the past 24 hrs:   Temp Pulse Resp BP SpO2   12/15/21 0734 98.1 °F (36.7 °C) 64 18 (!) 158/77 92 %   12/15/21 0408 98.3 °F (36.8 °C) (!) 53 17 103/67 94 %   12/14/21 2342 97.7 °F (36.5 °C) (!) 51 17 133/79 97 %   12/14/21 1922 98.6 °F (37 °C) (!) 54 16 (!) 132/53 94 %   12/14/21 1130 98.8 °F (37.1 °C) (!) 57 16 (!) 151/54 97 %     Oxygen Therapy  O2 Sat (%): 92 % (12/15/21 0734)  Pulse via Oximetry: 83 beats per minute (12/12/21 2026)  O2 Device: None (Room air) (12/14/21 1909)    Estimated body mass index is 28.13 kg/m² as calculated from the following:    Height as of this encounter: 5' 1\" (1.549 m). Weight as of this encounter: 67.5 kg (148 lb 14.4 oz). Intake/Output Summary (Last 24 hours) at 12/15/2021 0952  Last data filed at 12/14/2021 1342  Gross per 24 hour   Intake    Output 400 ml   Net -400 ml         Physical Exam:  General:    Well nourished. No overt distress  Head:  Normocephalic, atraumatic  Eyes:  Sclerae appear normal.  Pupils equally round. HENT:  Nares appear normal, no drainage. Moist mucous membranes  Neck:  No restricted ROM. Trachea midline  CV:   RRR. No m/r/g. No JVD  Lungs:   CTAB. No wheezing, rhonchi, or rales. Even, unlabored  Abdomen:   Soft, nontender, nondistended. Extremities: Warm and dry. No cyanosis or clubbing. No edema. Skin:     No rashes. Normal coloration  Neuro:  CN II-XII grossly intact. Psych:  Normal mood and affect. Signed:  Mc Boston DO    Part of this note may have been written by using a voice dictation software. The note has been proof read but may still contain some grammatical/other typographical errors.

## 2021-12-15 NOTE — PROGRESS NOTES
Hospitalist Progress Note   Admit Date:  2021  8:21 PM   Name:  Nels Mohs   Age:  81 y.o. Sex:  female  :  1935   MRN:  [id number]   Room:  [facility]/    Presenting Complaint: Fatigue    Reason(s) for Admission: UTI (urinary tract infection) [N39.0]     Hospital Course & Interval History:     Nels Mohs is a 81 y.o. female with medical history of P-AFIB, DM2, HTN who presented to ED with concerns about global weakness.  She apparently had a hard time getting off the couch earlier today.  Fire department was called because she could not get off the floor after she tried to get up to get to the restroom. She states she sort of just slid down to the floor.  At that time she said EMS did not transport her because she did not think she needs to go to the hospital.  Several hours later a similar thing happened where the patient tried to get up to get to the bathroom but was too weak to successfully do that.  EMS was called again and this time she agreed to be transported to the hospital. Nicolasa Small said she has not had any focal symptoms including no focal arm or leg weakness.  She had no language or speech problems.  She has had no fevers or chills and no nausea, vomiting, or diarrhea.  She denies any cough or difficulty breathing.     Family also reported that she has had multiple falls over the past couple of months.  She had an ER visit with CT scan of her head in mid November.  That time there was no obvious evidence of trauma.     No other associated symptoms.     Tonight, Workup in ER reveals UTI, no fever, mild leukocytosis, normal lactic acid. She had CT head shows nothing acute.     Hospitalist asked to admit    Subjective (21):  Pt in bedside chair. In very good spirits. Alert and oriented today. Understands medical plan. Assessment & Plan:     Principal Problem:    UTI (urinary tract infection) (2021)     - continue Rocephin D3,GNR, follow for ID and sens.       Diabetes ([facility] (11/13/2014)    12/13 - continue glucatrol and SSI  12/14 - BG to 60 this AM. Holding glucotrol. A1C 6.9. Atrial fibrillation with RVR ([facility] (11/17/2014)  12/13 - rate controlled, on eliquis        Essential hypertension with goal blood pressure less than 140/90 (8/25/2016)    12/13 - stable, continue inderal    Debility  12/14 - working well with PT. Plans for STR tomorrow    Dispo/Discharge Planning:     PT recs for STR. CM following, PPD ordered. Likely to STR tomorrow? Diet:  ADULT DIET Regular; 4 carb choices (60 gm/meal)  DVT PPx: eliquis  Code status: Full Code    Hospital Problems as of 12/14/2021 Never Reviewed          Codes Class Noted - Resolved POA    * (Principal) UTI (urinary tract infection) ICD-10-CM: N39.0  ICD-9-CM: 599.0  12/13/2021 - Present Unknown        Scalp hematoma ICD-10-CM: S00. 03XA  ICD-9-CM: 623  12/13/2021 - Present         Thyroid enlargement ICD-10-CM: E04.9  ICD-9-CM: 240.9  12/13/2021 - Present         Essential hypertension with goal blood pressure less than 140/90 ICD-10-CM: I10  ICD-9-CM: 401.9  8/25/2016 - Present Yes        Atrial fibrillation with RVR ([facility]) ICD-10-CM: I48.91  ICD-9-CM: 427.31  11/17/2014 - Present Yes        Diabetes ([facility] ICD-10-CM: E11.9  ICD-9-CM: 250.00  11/13/2014 - Present Yes              Objective:     Patient Vitals for the past 24 hrs:   Temp Pulse Resp BP SpO2   12/14/21 1130 98.8 °F (37.1 °C) (!) 57 16 (!) 151/54 97 %   12/14/21 0454 98.3 °F (36.8 °C) (!) 58 16 (!) 154/68 93 %   12/14/21 0003 97.5 °F (36.4 °C) (!) 58 17 123/66 95 %   12/13/21 1941 98.2 °F (36.8 °C) (!) 58 16 (!) 117/53 95 %     Oxygen Therapy  O2 Sat (%): 97 % (12/14/21 1130)  Pulse via Oximetry: 83 beats per minute (12/12/21 2026)  O2 Device: None (Room air) (12/14/21 1909)    Estimated body mass index is 28.03 kg/m² as calculated from the following:    Height as of this encounter: 5' 1\" (1.549 m).     Weight as of this encounter: 67.3 kg (148 lb 5.9 oz).    Intake/Output Summary (Last 24 hours) at 12/14/2021 1939  Last data filed at 12/14/2021 1342  Gross per 24 hour   Intake 480 ml   Output 975 ml   Net -495 ml         Physical Exam:     Blood pressure (!) 151/54, pulse (!) 57, temperature 98.8 °F (37.1 °C), resp. rate 16, height 5' 1\" (1.549 m), weight 67.3 kg (148 lb 5.9 oz), SpO2 97 %. General:    Well nourished. No overt distress, pleasantly confused  Head:  Normocephalic, atraumatic  Eyes:  Sclerae appear normal.  Pupils equally round. ENT:  Nares appear normal, no drainage. Moist oral mucosa  Neck:  No restricted ROM. Trachea midline   CV:   RRR. No m/r/g. No jugular venous distension. Lungs:   CTAB. No wheezing, rhonchi, or rales. Respirations even, unlabored  Abdomen: Bowel sounds present. Soft, nontender, nondistended. Extremities: No cyanosis or clubbing. No edema  Skin:     No rashes and normal coloration. Warm and dry. Neuro:  CN II-XII grossly intact. Sensation intact. A&Ox2  Psych:  Normal mood and affect. I have reviewed ordered lab tests and independently visualized imaging below:    Recent Labs:  Recent Results (from the past 48 hour(s))   CBC WITH AUTOMATED DIFF    Collection Time: 12/12/21  8:55 PM   Result Value Ref Range    WBC 11.4 (H) 4.3 - 11.1 K/uL    RBC 4.14 4.05 - 5.2 M/uL    HGB 12.3 11.7 - 15.4 g/dL    HCT 39.1 35.8 - 46.3 %    MCV 94.4 79.6 - 97.8 FL    MCH 29.7 26.1 - 32.9 PG    MCHC 31.5 31.4 - 35.0 g/dL    RDW 13.8 11.9 - 14.6 %    PLATELET 433 097 - 609 K/uL    MPV 11.2 9.4 - 12.3 FL    ABSOLUTE NRBC 0.00 0.0 - 0.2 K/uL    DF AUTOMATED      NEUTROPHILS 84 (H) 43 - 78 %    LYMPHOCYTES 8 (L) 13 - 44 %    MONOCYTES 5 4.0 - 12.0 %    EOSINOPHILS 2 0.5 - 7.8 %    BASOPHILS 0 0.0 - 2.0 %    IMMATURE GRANULOCYTES 0 0.0 - 5.0 %    ABS. NEUTROPHILS 9.6 (H) 1.7 - 8.2 K/UL    ABS. LYMPHOCYTES 0.9 0.5 - 4.6 K/UL    ABS. MONOCYTES 0.6 0.1 - 1.3 K/UL    ABS. EOSINOPHILS 0.3 0.0 - 0.8 K/UL    ABS.  BASOPHILS 0.0 0.0 - 0.2 K/UL    ABS. IMM.  GRANS. 0.0 0.0 - 0.5 K/UL   METABOLIC PANEL, BASIC    Collection Time: 12/12/21  8:55 PM   Result Value Ref Range    Sodium 138 136 - 145 mmol/L    Potassium 3.8 3.5 - 5.1 mmol/L    Chloride 103 98 - 107 mmol/L    CO2 28 21 - 32 mmol/L    Anion gap 7 7 - 16 mmol/L    Glucose 241 (H) 65 - 100 mg/dL    BUN 25 (H) 8 - 23 MG/DL    Creatinine 1.50 (H) 0.6 - 1.0 MG/DL    GFR est AA 42 (L) >60 ml/min/1.73m2    GFR est non-AA 35 (L) >60 ml/min/1.73m2    Calcium 9.1 8.3 - 10.4 MG/DL   LACTIC ACID    Collection Time: 12/12/21  8:55 PM   Result Value Ref Range    Lactic acid 1.5 0.4 - 2.0 MMOL/L   MAGNESIUM    Collection Time: 12/12/21  8:55 PM   Result Value Ref Range    Magnesium 1.7 (L) 1.8 - 2.4 mg/dL   POC URINE MACROSCOPIC    Collection Time: 12/12/21 11:22 PM   Result Value Ref Range    Spec. gravity (POC) >1.030 (H) 1.001 - 1.023    pH, urine  (POC) 5.5 5.0 - 9.0      Protein (POC) 100 (A) NEG mg/dL    Glucose, urine (POC) 100 (A) NEG mg/dL    Ketones (POC) Negative NEG mg/dL    Bilirubin (POC) Negative NEG      Blood (POC) LARGE (A) NEG      Urobilinogen (POC) 0.2 0.2 - 1.0 EU/dL    Nitrite (POC) Negative NEG      Leukocyte esterase (POC) TRACE (A) NEG     URINE MICROSCOPIC    Collection Time: 12/12/21 11:27 PM   Result Value Ref Range    WBC >100 (H) 0 /hpf    RBC 3-5 0 /hpf    Epithelial cells 0 0 /hpf    Bacteria 4+ (H) 0 /hpf    Casts 10-20 0 /lpf   CULTURE, URINE    Collection Time: 12/12/21 11:27 PM    Specimen: Clean catch; Urine   Result Value Ref Range    Special Requests: NO SPECIAL REQUESTS      Culture result: (A)       >100,000 COLONIES/mL GRAM NEGATIVE RODS SUBCULTURE IN PROGRESS    Culture result: <10,000 COLONIES/mL MIXED SKIN PATRICIA ISOLATED     GLUCOSE, POC    Collection Time: 12/13/21  9:40 AM   Result Value Ref Range    Glucose (POC) 216 (H) 65 - 100 mg/dL    Performed by Brian    GLUCOSE, POC    Collection Time: 12/13/21 11:45 AM   Result Value Ref Range Glucose (POC) 145 (H) 65 - 100 mg/dL    Performed by Brian    GLUCOSE, POC    Collection Time: 12/13/21  3:44 PM   Result Value Ref Range    Glucose (POC) 95 65 - 100 mg/dL    Performed by Brian    GLUCOSE, POC    Collection Time: 12/13/21  8:45 PM   Result Value Ref Range    Glucose (POC) 114 (H) 65 - 100 mg/dL    Performed by Haydee    GLUCOSE, POC    Collection Time: 12/14/21  7:04 AM   Result Value Ref Range    Glucose (POC) 60 (L) 65 - 100 mg/dL    Performed by Lucia    CBC WITH AUTOMATED DIFF    Collection Time: 12/14/21  7:06 AM   Result Value Ref Range    WBC 6.1 4.3 - 11.1 K/uL    RBC 3.69 (L) 4.05 - 5.2 M/uL    HGB 11.0 (L) 11.7 - 15.4 g/dL    HCT 35.8 35.8 - 46.3 %    MCV 97.0 79.6 - 97.8 FL    MCH 29.8 26.1 - 32.9 PG    MCHC 30.7 (L) 31.4 - 35.0 g/dL    RDW 14.0 11.9 - 14.6 %    PLATELET 444 576 - 498 K/uL    MPV 11.2 9.4 - 12.3 FL    ABSOLUTE NRBC 0.00 0.0 - 0.2 K/uL    DF AUTOMATED      NEUTROPHILS 65 43 - 78 %    LYMPHOCYTES 25 13 - 44 %    MONOCYTES 7 4.0 - 12.0 %    EOSINOPHILS 3 0.5 - 7.8 %    BASOPHILS 1 0.0 - 2.0 %    IMMATURE GRANULOCYTES 0 0.0 - 5.0 %    ABS. NEUTROPHILS 3.9 1.7 - 8.2 K/UL    ABS. LYMPHOCYTES 1.5 0.5 - 4.6 K/UL    ABS. MONOCYTES 0.4 0.1 - 1.3 K/UL    ABS. EOSINOPHILS 0.2 0.0 - 0.8 K/UL    ABS. BASOPHILS 0.0 0.0 - 0.2 K/UL    ABS. IMM.  GRANS. 0.0 0.0 - 0.5 K/UL   METABOLIC PANEL, BASIC    Collection Time: 12/14/21  7:06 AM   Result Value Ref Range    Sodium 143 136 - 145 mmol/L    Potassium 3.8 3.5 - 5.1 mmol/L    Chloride 109 (H) 98 - 107 mmol/L    CO2 27 21 - 32 mmol/L    Anion gap 7 7 - 16 mmol/L    Glucose 61 (L) 65 - 100 mg/dL    BUN 27 (H) 8 - 23 MG/DL    Creatinine 1.27 (H) 0.6 - 1.0 MG/DL    GFR est AA 51 (L) >60 ml/min/1.73m2    GFR est non-AA 43 (L) >60 ml/min/1.73m2    Calcium 8.6 8.3 - 10.4 MG/DL   GLUCOSE, POC    Collection Time: 12/14/21  8:25 AM   Result Value Ref Range    Glucose (POC) 131 (H) 65 - 100 mg/dL Performed by [address] [place] [address]. HEMOGLOBIN A1C WITH EAG    Collection Time: 12/14/21  9:55 AM   Result Value Ref Range    Hemoglobin A1c 6.9 (H) 4.20 - 6.30 %    Est. average glucose 151 mg/dL   PLEASE READ & DOCUMENT PPD TEST IN 24 HRS    Collection Time: 12/14/21 10:45 AM   Result Value Ref Range    PPD Negative Negative    mm Induration 0 0 - 5 mm   GLUCOSE, POC    Collection Time: 12/14/21 11:38 AM   Result Value Ref Range    Glucose (POC) 149 (H) 65 - 100 mg/dL    Performed by Arsen    GLUCOSE, POC    Collection Time: 12/14/21  4:16 PM   Result Value Ref Range    Glucose (POC) 196 (H) 65 - 100 mg/dL    Performed by Ophelia Andrews Micro Results     Procedure Component Value Units Date/Time    CULTURE, URINE [[id number]]  (Abnormal) Collected: 12/12/21 9141    Order Status: Completed Specimen: Urine from Clean catch Updated: 12/14/21 5625     Special Requests: NO SPECIAL REQUESTS        Culture result:       >100,000 COLONIES/mL GRAM NEGATIVE RODS SUBCULTURE IN PROGRESS                  <10,000 COLONIES/mL MIXED SKIN PATRICIA ISOLATED                Other Studies:  No results found.     Current Meds:  Current Facility-Administered Medications   Medication Dose Route Frequency    sodium chloride (NS) flush 5-40 mL  5-40 mL IntraVENous Q8H    sodium chloride (NS) flush 5-40 mL  5-40 mL IntraVENous PRN    acetaminophen (TYLENOL) tablet 650 mg  650 mg Oral Q6H PRN    Or    acetaminophen (TYLENOL) suppository 650 mg  650 mg Rectal Q6H PRN    polyethylene glycol (MIRALAX) packet 17 g  17 g Oral DAILY PRN    ondansetron (ZOFRAN ODT) tablet 4 mg  4 mg Oral Q8H PRN    Or    ondansetron (ZOFRAN) injection 4 mg  4 mg IntraVENous Q6H PRN    insulin lispro (HUMALOG) injection   SubCUTAneous AC&HS    cefTRIAXone (ROCEPHIN) 1 g in 0.9% sodium chloride (MBP/ADV) 50 mL MBP  1 g IntraVENous Q24H    fenofibrate (LOFIBRA) tablet 160 mg  160 mg Oral DAILY    gabapentin (NEURONTIN) capsule 100 mg 100 mg Oral TID    [Held by provider] glipiZIDE (GLUCOTROL) tablet 5 mg  5 mg Oral ACB&D    latanoprost (XALATAN) 0.005 % ophthalmic solution 1 Drop  1 Drop Both Eyes QHS    LORazepam (ATIVAN) tablet 0.5 mg  0.5 mg Oral Q8H PRN    pravastatin (PRAVACHOL) tablet 80 mg  80 mg Oral QHS    propranoloL (INDERAL) tablet 20 mg  20 mg Oral BID    apixaban (ELIQUIS) tablet 2.5 mg  2.5 mg Oral BID       Signed:  Theodore Parent, DO    Part of this note may have been written by using a voice dictation software. The note has been proof read but may still contain some grammatical/other typographical errors.

## 2021-12-15 NOTE — DISCHARGE INSTRUCTIONS
DISCHARGE SUMMARY from Nurse    PATIENT INSTRUCTIONS:    After general anesthesia or intravenous sedation, for 24 hours or while taking prescription Narcotics:  · Limit your activities  · Do not drive and operate hazardous machinery  · Do not make important personal or business decisions  · Do  not drink alcoholic beverages  · If you have not urinated within 8 hours after discharge, please contact your surgeon on call. Report the following to your surgeon:  · Excessive pain, swelling, redness or odor of or around the surgical area  · Temperature over 100.5  · Nausea and vomiting lasting longer than 4 hours or if unable to take medications  · Any signs of decreased circulation or nerve impairment to extremity: change in color, persistent  numbness, tingling, coldness or increase pain  · Any questions    What to do at Home:  Recommended activity: Activity as tolerated    *  Please give a list of your current medications to your Primary Care Provider. *  Please update this list whenever your medications are discontinued, doses are      changed, or new medications (including over-the-counter products) are added. *  Please carry medication information at all times in case of emergency situations. These are general instructions for a healthy lifestyle:    No smoking/ No tobacco products/ Avoid exposure to second hand smoke  Surgeon General's Warning:  Quitting smoking now greatly reduces serious risk to your health. Obesity, smoking, and sedentary lifestyle greatly increases your risk for illness    A healthy diet, regular physical exercise & weight monitoring are important for maintaining a healthy lifestyle    You may be retaining fluid if you have a history of heart failure or if you experience any of the following symptoms:  Weight gain of 3 pounds or more overnight or 5 pounds in a week, increased swelling in our hands or feet or shortness of breath while lying flat in bed.   Please call your doctor as soon as you notice any of these symptoms; do not wait until your next office visit. The discharge information has been reviewed with the patient. The patient verbalized understanding. Discharge medications reviewed with the patientPatient Education        Urinary Tract Infection (UTI) in Women: Care Instructions  Overview     A urinary tract infection, or UTI, is a general term for an infection anywhere between the kidneys and the urethra (where urine comes out). Most UTIs are bladder infections. They often cause pain or burning when you urinate. UTIs are caused by bacteria and can be cured with antibiotics. Be sure to complete your treatment so that the infection does not get worse. Follow-up care is a key part of your treatment and safety. Be sure to make and go to all appointments, and call your doctor if you are having problems. It's also a good idea to know your test results and keep a list of the medicines you take. How can you care for yourself at home? · Take your antibiotics as directed. Do not stop taking them just because you feel better. You need to take the full course of antibiotics. · Drink extra water and other fluids for the next day or two. This will help make the urine less concentrated and help wash out the bacteria that are causing the infection. (If you have kidney, heart, or liver disease and have to limit fluids, talk with your doctor before you increase the amount of fluids you drink.)  · Avoid drinks that are carbonated or have caffeine. They can irritate the bladder. · Urinate often. Try to empty your bladder each time. · To relieve pain, take a hot bath or lay a heating pad set on low over your lower belly or genital area. Never go to sleep with a heating pad in place. To prevent UTIs  · Drink plenty of water each day. This helps you urinate often, which clears bacteria from your system.  (If you have kidney, heart, or liver disease and have to limit fluids, talk with your doctor before you increase the amount of fluids you drink.)  · Urinate when you need to. · If you are sexually active, urinate right after you have sex. · Change sanitary pads often. · Avoid douches, bubble baths, feminine hygiene sprays, and other feminine hygiene products that have deodorants. · After going to the bathroom, wipe from front to back. When should you call for help? Call your doctor now or seek immediate medical care if:    · Symptoms such as fever, chills, nausea, or vomiting get worse or appear for the first time.     · You have new pain in your back just below your rib cage. This is called flank pain.     · There is new blood or pus in your urine.     · You have any problems with your antibiotic medicine. Watch closely for changes in your health, and be sure to contact your doctor if:    · You are not getting better after taking an antibiotic for 2 days.     · Your symptoms go away but then come back. Where can you learn more? Go to http://www.gray.com/  Enter E806 in the search box to learn more about \"Urinary Tract Infection (UTI) in Women: Care Instructions. \"  Current as of: February 10, 2021               Content Version: 13.0  © 2006-2021 [employer], [employer]. Care instructions adapted under license by [employer] (which disclaims liability or warranty for this information). If you have questions about a medical condition or this instruction, always ask your healthcare professional. [address] any warranty or liability for your use of this information. and appropriate educational materials and side effects teaching were provided.   ___________________________________________________________________________________________________________________________________

## 2021-12-15 NOTE — PROGRESS NOTES
Pt is discharging to [facility]. Hourly rounds completed. Called and gave verbal report to Nurse Sharla at [facility]. Opportunity for questions provided. All questions answered. Pt denies needs at this time. Pt in room waiting for  time.

## 2021-12-16 LAB
BACTERIA SPEC CULT: ABNORMAL
BACTERIA SPEC CULT: ABNORMAL
SERVICE CMNT-IMP: ABNORMAL

## 2022-02-23 ENCOUNTER — HOSPITAL ENCOUNTER (INPATIENT)
Age: 87
LOS: 5 days | Discharge: NURSING, REHAB OR LONG-TERM CARE | DRG: 522 | End: 2022-02-28
Attending: EMERGENCY MEDICINE | Admitting: FAMILY MEDICINE
Payer: MEDICARE

## 2022-02-23 ENCOUNTER — APPOINTMENT (OUTPATIENT)
Dept: GENERAL RADIOLOGY | Age: 87
DRG: 522 | End: 2022-02-23
Attending: EMERGENCY MEDICINE
Payer: MEDICARE

## 2022-02-23 ENCOUNTER — ANESTHESIA EVENT (OUTPATIENT)
Dept: SURGERY | Age: 87
DRG: 522 | End: 2022-02-23
Payer: MEDICARE

## 2022-02-23 ENCOUNTER — APPOINTMENT (OUTPATIENT)
Dept: CT IMAGING | Age: 87
DRG: 522 | End: 2022-02-23
Attending: EMERGENCY MEDICINE
Payer: MEDICARE
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
## 2022-02-23 DIAGNOSIS — N17.9 ACUTE RENAL FAILURE, UNSPECIFIED ACUTE RENAL FAILURE TYPE (HCC): ICD-10-CM

## 2022-02-23 DIAGNOSIS — S72.002A CLOSED DISPLACED FRACTURE OF LEFT FEMORAL NECK (HCC): Primary | ICD-10-CM

## 2022-02-23 PROBLEM — S72.92XA FEMUR FRACTURE, LEFT (HCC): Status: ACTIVE | Noted: 2022-02-23

## 2022-02-23 LAB
ABO + RH BLD: NORMAL
ALBUMIN SERPL-MCNC: 3.1 G/DL (ref 3.2–4.6)
ALBUMIN/GLOB SERPL: 0.9 {RATIO} (ref 1.2–3.5)
ALP SERPL-CCNC: 45 U/L (ref 50–136)
ALT SERPL-CCNC: 21 U/L (ref 12–65)
ANION GAP SERPL CALC-SCNC: 3 MMOL/L (ref 7–16)
AST SERPL-CCNC: 35 U/L (ref 15–37)
BASOPHILS # BLD: 0 K/UL (ref 0–0.2)
BASOPHILS NFR BLD: 0 % (ref 0–2)
BILIRUB SERPL-MCNC: 0.4 MG/DL (ref 0.2–1.1)
BLOOD GROUP ANTIBODIES SERPL: NORMAL
BUN SERPL-MCNC: 42 MG/DL (ref 8–23)
CALCIUM SERPL-MCNC: 9.1 MG/DL (ref 8.3–10.4)
CHLORIDE SERPL-SCNC: 104 MMOL/L (ref 98–107)
CO2 SERPL-SCNC: 29 MMOL/L (ref 21–32)
COVID-19 RAPID TEST, COVR: DETECTED
CREAT SERPL-MCNC: 1.9 MG/DL (ref 0.6–1)
DIFFERENTIAL METHOD BLD: ABNORMAL
EOSINOPHIL # BLD: 0.3 K/UL (ref 0–0.8)
EOSINOPHIL NFR BLD: 4 % (ref 0.5–7.8)
ERYTHROCYTE [DISTWIDTH] IN BLOOD BY AUTOMATED COUNT: 14.4 % (ref 11.9–14.6)
GLOBULIN SER CALC-MCNC: 3.4 G/DL (ref 2.3–3.5)
GLUCOSE BLD STRIP.AUTO-MCNC: 140 MG/DL (ref 65–100)
GLUCOSE BLD STRIP.AUTO-MCNC: 79 MG/DL (ref 65–100)
GLUCOSE SERPL-MCNC: 146 MG/DL (ref 65–100)
HCT VFR BLD AUTO: 31.4 % (ref 35.8–46.3)
HGB BLD-MCNC: 9.5 G/DL (ref 11.7–15.4)
IMM GRANULOCYTES # BLD AUTO: 0 K/UL (ref 0–0.5)
IMM GRANULOCYTES NFR BLD AUTO: 1 % (ref 0–5)
LYMPHOCYTES # BLD: 1 K/UL (ref 0.5–4.6)
LYMPHOCYTES NFR BLD: 14 % (ref 13–44)
MCH RBC QN AUTO: 29.5 PG (ref 26.1–32.9)
MCHC RBC AUTO-ENTMCNC: 30.3 G/DL (ref 31.4–35)
MCV RBC AUTO: 97.5 FL (ref 79.6–97.8)
MONOCYTES # BLD: 0.4 K/UL (ref 0.1–1.3)
MONOCYTES NFR BLD: 6 % (ref 4–12)
NEUTS SEG # BLD: 5.2 K/UL (ref 1.7–8.2)
NEUTS SEG NFR BLD: 75 % (ref 43–78)
NRBC # BLD: 0 K/UL (ref 0–0.2)
PLATELET # BLD AUTO: 123 K/UL (ref 150–450)
PMV BLD AUTO: 12.1 FL (ref 9.4–12.3)
POTASSIUM SERPL-SCNC: 5 MMOL/L (ref 3.5–5.1)
PROT SERPL-MCNC: 6.5 G/DL (ref 6.3–8.2)
RBC # BLD AUTO: 3.22 M/UL (ref 4.05–5.2)
SERVICE CMNT-IMP: ABNORMAL
SERVICE CMNT-IMP: NORMAL
SODIUM SERPL-SCNC: 136 MMOL/L (ref 136–145)
SOURCE, COVRS: ABNORMAL
SPECIMEN EXP DATE BLD: NORMAL
WBC # BLD AUTO: 7 K/UL (ref 4.3–11.1)

## 2022-02-23 PROCEDURE — 80053 COMPREHEN METABOLIC PANEL: CPT

## 2022-02-23 PROCEDURE — 82962 GLUCOSE BLOOD TEST: CPT

## 2022-02-23 PROCEDURE — 93005 ELECTROCARDIOGRAM TRACING: CPT | Performed by: EMERGENCY MEDICINE

## 2022-02-23 PROCEDURE — 73502 X-RAY EXAM HIP UNI 2-3 VIEWS: CPT

## 2022-02-23 PROCEDURE — 74011000250 HC RX REV CODE- 250: Performed by: FAMILY MEDICINE

## 2022-02-23 PROCEDURE — 71045 X-RAY EXAM CHEST 1 VIEW: CPT

## 2022-02-23 PROCEDURE — 85025 COMPLETE CBC W/AUTO DIFF WBC: CPT

## 2022-02-23 PROCEDURE — 86580 TB INTRADERMAL TEST: CPT | Performed by: FAMILY MEDICINE

## 2022-02-23 PROCEDURE — 36415 COLL VENOUS BLD VENIPUNCTURE: CPT

## 2022-02-23 PROCEDURE — 74011250636 HC RX REV CODE- 250/636: Performed by: EMERGENCY MEDICINE

## 2022-02-23 PROCEDURE — 99285 EMERGENCY DEPT VISIT HI MDM: CPT

## 2022-02-23 PROCEDURE — 96375 TX/PRO/DX INJ NEW DRUG ADDON: CPT

## 2022-02-23 PROCEDURE — 65270000029 HC RM PRIVATE

## 2022-02-23 PROCEDURE — 74011250637 HC RX REV CODE- 250/637: Performed by: FAMILY MEDICINE

## 2022-02-23 PROCEDURE — 96361 HYDRATE IV INFUSION ADD-ON: CPT

## 2022-02-23 PROCEDURE — 74011250636 HC RX REV CODE- 250/636: Performed by: FAMILY MEDICINE

## 2022-02-23 PROCEDURE — 70450 CT HEAD/BRAIN W/O DYE: CPT

## 2022-02-23 PROCEDURE — 96374 THER/PROPH/DIAG INJ IV PUSH: CPT

## 2022-02-23 PROCEDURE — 72125 CT NECK SPINE W/O DYE: CPT

## 2022-02-23 PROCEDURE — 87635 SARS-COV-2 COVID-19 AMP PRB: CPT

## 2022-02-23 PROCEDURE — 86900 BLOOD TYPING SEROLOGIC ABO: CPT

## 2022-02-23 RX ORDER — SODIUM CHLORIDE 0.9 % (FLUSH) 0.9 %
5-40 SYRINGE (ML) INJECTION EVERY 8 HOURS
Status: DISCONTINUED | OUTPATIENT
Start: 2022-02-23 | End: 2022-02-28 | Stop reason: HOSPADM

## 2022-02-23 RX ORDER — POLYETHYLENE GLYCOL 3350 17 G/17G
17 POWDER, FOR SOLUTION ORAL DAILY PRN
Status: DISCONTINUED | OUTPATIENT
Start: 2022-02-23 | End: 2022-02-28 | Stop reason: HOSPADM

## 2022-02-23 RX ORDER — CETIRIZINE HYDROCHLORIDE 5 MG/1
5 TABLET ORAL DAILY
Status: DISCONTINUED | OUTPATIENT
Start: 2022-02-24 | End: 2022-02-28 | Stop reason: HOSPADM

## 2022-02-23 RX ORDER — ACETAMINOPHEN 650 MG/1
650 SUPPOSITORY RECTAL
Status: DISCONTINUED | OUTPATIENT
Start: 2022-02-23 | End: 2022-02-28 | Stop reason: HOSPADM

## 2022-02-23 RX ORDER — ONDANSETRON 2 MG/ML
4 INJECTION INTRAMUSCULAR; INTRAVENOUS
Status: COMPLETED | OUTPATIENT
Start: 2022-02-23 | End: 2022-02-23

## 2022-02-23 RX ORDER — SODIUM CHLORIDE 0.9 % (FLUSH) 0.9 %
5-40 SYRINGE (ML) INJECTION AS NEEDED
Status: DISCONTINUED | OUTPATIENT
Start: 2022-02-23 | End: 2022-02-28 | Stop reason: HOSPADM

## 2022-02-23 RX ORDER — ACETAMINOPHEN 325 MG/1
650 TABLET ORAL
Status: DISCONTINUED | OUTPATIENT
Start: 2022-02-23 | End: 2022-02-28 | Stop reason: HOSPADM

## 2022-02-23 RX ORDER — CALCIUM CARBONATE 500(1250)
500 TABLET ORAL DAILY
Status: DISCONTINUED | OUTPATIENT
Start: 2022-02-24 | End: 2022-02-25 | Stop reason: SDUPTHER

## 2022-02-23 RX ORDER — PROPRANOLOL HYDROCHLORIDE 10 MG/1
20 TABLET ORAL 2 TIMES DAILY
Status: DISCONTINUED | OUTPATIENT
Start: 2022-02-23 | End: 2022-02-28 | Stop reason: HOSPADM

## 2022-02-23 RX ORDER — ONDANSETRON 4 MG/1
4 TABLET, ORALLY DISINTEGRATING ORAL
Status: DISCONTINUED | OUTPATIENT
Start: 2022-02-23 | End: 2022-02-28 | Stop reason: HOSPADM

## 2022-02-23 RX ORDER — ONDANSETRON 2 MG/ML
4 INJECTION INTRAMUSCULAR; INTRAVENOUS
Status: DISCONTINUED | OUTPATIENT
Start: 2022-02-23 | End: 2022-02-28 | Stop reason: HOSPADM

## 2022-02-23 RX ORDER — PRAVASTATIN SODIUM 80 MG/1
80 TABLET ORAL
Status: DISCONTINUED | OUTPATIENT
Start: 2022-02-23 | End: 2022-02-28 | Stop reason: HOSPADM

## 2022-02-23 RX ORDER — MORPHINE SULFATE 4 MG/ML
2 INJECTION INTRAVENOUS
Status: COMPLETED | OUTPATIENT
Start: 2022-02-23 | End: 2022-02-23

## 2022-02-23 RX ORDER — LATANOPROST 50 UG/ML
1 SOLUTION/ DROPS OPHTHALMIC
Status: DISCONTINUED | OUTPATIENT
Start: 2022-02-23 | End: 2022-02-28 | Stop reason: HOSPADM

## 2022-02-23 RX ORDER — INSULIN LISPRO 100 [IU]/ML
INJECTION, SOLUTION INTRAVENOUS; SUBCUTANEOUS
Status: DISCONTINUED | OUTPATIENT
Start: 2022-02-23 | End: 2022-02-28 | Stop reason: HOSPADM

## 2022-02-23 RX ORDER — MORPHINE SULFATE 4 MG/ML
1 INJECTION INTRAVENOUS
Status: DISCONTINUED | OUTPATIENT
Start: 2022-02-23 | End: 2022-02-25

## 2022-02-23 RX ORDER — OXYCODONE HYDROCHLORIDE 5 MG/1
5 TABLET ORAL
Status: DISCONTINUED | OUTPATIENT
Start: 2022-02-23 | End: 2022-02-25

## 2022-02-23 RX ORDER — SODIUM CHLORIDE 9 MG/ML
50 INJECTION, SOLUTION INTRAVENOUS CONTINUOUS
Status: DISCONTINUED | OUTPATIENT
Start: 2022-02-23 | End: 2022-02-24

## 2022-02-23 RX ORDER — FENOFIBRATE 160 MG/1
160 TABLET ORAL DAILY
Status: DISCONTINUED | OUTPATIENT
Start: 2022-02-24 | End: 2022-02-28 | Stop reason: HOSPADM

## 2022-02-23 RX ORDER — GABAPENTIN 100 MG/1
100 CAPSULE ORAL 3 TIMES DAILY
Status: DISCONTINUED | OUTPATIENT
Start: 2022-02-23 | End: 2022-02-28 | Stop reason: HOSPADM

## 2022-02-23 RX ADMIN — PRAVASTATIN SODIUM 80 MG: 80 TABLET ORAL at 21:59

## 2022-02-23 RX ADMIN — ACETAMINOPHEN 650 MG: 325 TABLET ORAL at 22:03

## 2022-02-23 RX ADMIN — SODIUM CHLORIDE 50 ML/HR: 900 INJECTION, SOLUTION INTRAVENOUS at 16:57

## 2022-02-23 RX ADMIN — OXYCODONE HYDROCHLORIDE 5 MG: 5 TABLET ORAL at 18:05

## 2022-02-23 RX ADMIN — PROPRANOLOL HYDROCHLORIDE 20 MG: 10 TABLET ORAL at 18:01

## 2022-02-23 RX ADMIN — MORPHINE SULFATE 2 MG: 4 INJECTION INTRAVENOUS at 12:41

## 2022-02-23 RX ADMIN — LATANOPROST 1 DROP: 50 SOLUTION OPHTHALMIC at 21:59

## 2022-02-23 RX ADMIN — GABAPENTIN 100 MG: 100 CAPSULE ORAL at 18:01

## 2022-02-23 RX ADMIN — SODIUM CHLORIDE, PRESERVATIVE FREE 10 ML: 5 INJECTION INTRAVENOUS at 16:58

## 2022-02-23 RX ADMIN — ONDANSETRON 4 MG: 2 INJECTION INTRAMUSCULAR; INTRAVENOUS at 12:42

## 2022-02-23 RX ADMIN — TUBERCULIN PURIFIED PROTEIN DERIVATIVE 5 UNITS: 5 INJECTION, SOLUTION INTRADERMAL at 18:10

## 2022-02-23 RX ADMIN — SODIUM CHLORIDE 1000 ML: 900 INJECTION, SOLUTION INTRAVENOUS at 13:00

## 2022-02-23 RX ADMIN — GABAPENTIN 100 MG: 100 CAPSULE ORAL at 21:59

## 2022-02-23 NOTE — H&P
Hospitalist Admission History and Physical     NAME:  Imelda Paul   Age:  81 y.o.  :   1935   MRN:   [id number]  PCP: Raheel Shepard MD  Consulting MD:  Treatment Team: Attending Provider: Geovanna Buckley DO; Primary Nurse: Slime Mcduffie; Primary Nurse: Jarvis Ocasio RN; Surgeon: Oracio Gudino MD    Chief Complaint   Patient presents with   Royanne Hook         HPI:   Patient is a 81 y.o. female who presented to the ED for cc left hip pain after a fall when tripping over her wheel chair. Denies syncope or dizziness. Hx of DM type II, HTN, HLD, depression, CKD stage 3, paroxysmal a fib on Eliquis (unsure if her last dose was last night or this AM), and left heel wound. Currently at rehab center for generalized weakness. Vitals- stable    Labs- Hg 9.5 (was 11 on 21)--denies obvious bleeding, platelets 594, creatine 1.9 from baseline 1.4    Hip x ray with left femoral neck fracture  Past Medical History:   Diagnosis Date    Chronic kidney disease     renal insufficiency    [facility]     Diabetes ([facility] [facility] 2014    Hypertension     Obesity 2016    Psychiatric disorder     depeession        Past Surgical History:   Procedure Laterality Date    HX CHOLECYSTECTOMY          No family history on file.     Social History     Social History Narrative    Not on file        Social History     Tobacco Use    Smoking status: Never Smoker    Smokeless tobacco: Not on file   Substance Use Topics    Alcohol use: No        Social History     Substance and Sexual Activity   Drug Use Not on file         Allergies   Allergen Reactions    Ciprocinonide Unknown (comments)     Cant remember    Citalopram Other (comments)     Cant remember    Pioglitazone Other (comments)     Cant remember    Celecoxib Nausea and Vomiting    Codeine Nausea and Vomiting    Lisinopril Cough    Metronidazole Nausea and Vomiting       Prior to Admission medications    Medication Sig Start Date End Date Taking? Authorizing Provider   apixaban (ELIQUIS) 2.5 mg tablet Take 1 Tablet by mouth two (2) times a day. 12/15/21   Patrizia Parikh, DO   calcium carbonate (OS-CHELSEY) 500 mg calcium (1,250 mg) tablet Take 1 Tab by mouth daily. Provider, Historical   loratadine (CLARITIN) 10 mg tablet Take 10 mg by mouth. Provider, Historical   pravastatin (PRAVACHOL) 80 mg tablet Take 80 mg by mouth nightly. Provider, Historical   losartan (COZAAR) 25 mg tablet Take 1 Tab by mouth daily. Indications: HYPERTENSION WITH LEFT VENTRICULAR HYPERTROPHY  Patient not taking: Reported on 12/13/2021 8/25/16   George Cage MD   acetaminophen (TYLENOL) 325 mg tablet Take 2 tablets by mouth every four (4) hours as needed. 11/19/14   Mone Marin MD   Venlafaxine 150 mg tr24 Take 1 tablet by mouth daily. Provider, Historical   sertraline (ZOLOFT) 100 mg tablet Take 200 mg by mouth daily. Patient not taking: Reported on 12/13/2021    Provider, Historical   latanoprost (XALATAN) 0.005 % ophthalmic solution Administer 1 drop to both eyes nightly. Provider, Historical   propranolol (INDERAL) 20 mg tablet Take 20 mg by mouth two (2) times a day. Provider, Historical   Cholecalciferol, Vitamin D3, 50,000 unit cap Take  by mouth every seven (7) days. Provider, Historical   fenofibrate (TRICOR) 160 mg tablet Take 160 mg by mouth daily. Provider, Historical   gabapentin (NEURONTIN) 100 mg capsule Take 100 mg by mouth three (3) times daily.     Provider, Historical           Review of Systems    Constitutional: NAD  Eyes:  no change in visual acuity, no photophobia  Ears, nose, mouth, throat, and face: no  Odynphagia, dysphagia, no thrush or exudate, negative for chronic sinus congestion, recurrent headaches  Respiratory: negative for SOB, hemoptysis or cough  Cardiovascular: negative for CP, palpitations, or PND  Gastrointestinal: negative for abdominal pain, no hematemesis, hematochezia or BRBPR  Genitourinary: no urgency, frequency, or dysuria, no nocturia  Integument/breast: negative for skin rash or skin lesions  Hematologic/lymphatic: negative for known bleeding disorder  Musculoskeletal: left hip pain that is controlled  Neurological: negative for lightheadedness, syncope or presyncopal events, no seizure or CVA history  Behavioral/Psych: negative for depression or chronic anxiety,   Endocrine: negative for polydyspia, polyuria or intolerance to heat or cold  Allergic/Immunologic: negative for chronic allergic rhinitis, or known connective tissue disorder      Objective:     Visit Vitals  BP (!) 140/60   Pulse (!) 59   Temp 97.8 °F (36.6 °C)   Resp 19   Ht 5' 1\" (1.549 m)   Wt 67.1 kg (148 lb)   SpO2 97%   BMI 27.96 kg/m²        No intake/output data recorded. No intake/output data recorded. Data Review:   Recent Results (from the past 24 hour(s))   CBC WITH AUTOMATED DIFF    Collection Time: 02/23/22 11:21 AM   Result Value Ref Range    WBC 7.0 4.3 - 11.1 K/uL    RBC 3.22 (L) 4.05 - 5.2 M/uL    HGB 9.5 (L) 11.7 - 15.4 g/dL    HCT 31.4 (L) 35.8 - 46.3 %    MCV 97.5 79.6 - 97.8 FL    MCH 29.5 26.1 - 32.9 PG    MCHC 30.3 (L) 31.4 - 35.0 g/dL    RDW 14.4 11.9 - 14.6 %    PLATELET 711 (L) 371 - 450 K/uL    MPV 12.1 9.4 - 12.3 FL    ABSOLUTE NRBC 0.00 0.0 - 0.2 K/uL    DF AUTOMATED      NEUTROPHILS 75 43 - 78 %    LYMPHOCYTES 14 13 - 44 %    MONOCYTES 6 4.0 - 12.0 %    EOSINOPHILS 4 0.5 - 7.8 %    BASOPHILS 0 0.0 - 2.0 %    IMMATURE GRANULOCYTES 1 0.0 - 5.0 %    ABS. NEUTROPHILS 5.2 1.7 - 8.2 K/UL    ABS. LYMPHOCYTES 1.0 0.5 - 4.6 K/UL    ABS. MONOCYTES 0.4 0.1 - 1.3 K/UL    ABS. EOSINOPHILS 0.3 0.0 - 0.8 K/UL    ABS. BASOPHILS 0.0 0.0 - 0.2 K/UL    ABS. IMM.  GRANS. 0.0 0.0 - 0.5 K/UL   METABOLIC PANEL, COMPREHENSIVE    Collection Time: 02/23/22 11:21 AM   Result Value Ref Range    Sodium 136 136 - 145 mmol/L    Potassium 5.0 3.5 - 5.1 mmol/L    Chloride 104 98 - 107 mmol/L    CO2 29 21 - 32 mmol/L    Anion gap 3 (L) 7 - 16 mmol/L    Glucose 146 (H) 65 - 100 mg/dL    BUN 42 (H) 8 - 23 MG/DL    Creatinine 1.90 (H) 0.6 - 1.0 MG/DL    GFR est AA 32 (L) >60 ml/min/1.73m2    GFR est non-AA 27 (L) >60 ml/min/1.73m2    Calcium 9.1 8.3 - 10.4 MG/DL    Bilirubin, total 0.4 0.2 - 1.1 MG/DL    ALT (SGPT) 21 12 - 65 U/L    AST (SGOT) 35 15 - 37 U/L    Alk. phosphatase 45 (L) 50 - 136 U/L    Protein, total 6.5 6.3 - 8.2 g/dL    Albumin 3.1 (L) 3.2 - 4.6 g/dL    Globulin 3.4 2.3 - 3.5 g/dL    A-G Ratio 0.9 (L) 1.2 - 3.5     TYPE & SCREEN    Collection Time: 02/23/22 11:21 AM   Result Value Ref Range    Crossmatch Expiration 02/26/2022,2359     ABO/Rh(D) O POSITIVE     Antibody screen NEG        Physical Exam:     General:  Alert, cooperative, no distress, very talkative but pleasant. Eyes:  Conjunctivae/corneas clear. PERRL   Ears:  Normal TMs and external ear canals both ears. Nose: Nares normal.    Mouth/Throat: Dry tongue   Neck:  no JVD. Back:   deferred   Lungs:   Clear to auscultation bilaterally. Heart:  Regular rate and rhythm, S1, S2 normal   Abdomen:   Soft, non-tender. Bowel sounds normal. No masses,  No organomegaly. Extremities: External rotation of left LE. Good sensation to LE bilaterally. Left leg with soft heel. Pulses: 2+ and symmetric all extremities. Skin: Soft heel to left foot   Lymph nodes: Cervical, supraclavicular, and axillary nodes normal.   Neurologic: CNII-XII intact. Limited ROM in bed. Assessment and Plan     Principal Problem:    Femur fracture, left ([facility] [address] (2/23/2022)    Active Problems:    Diabetes ([address] (11/13/2014)      Essential hypertension with goal blood pressure less than 140/90 (8/25/2016)      Thyroid enlargement (12/13/2021)      CHAVA (acute kidney injury) ([address] (2/23/2022)    Left femoral neck fracture - Consult ortho. Will allow to eat since typically we hold Eliquis 48 hours before surgery. At moderate risk for surgical and post surgical complications.  Will repeat ECHO since RV pressure noted to be elevated in 2014. No hx of CHF. CHAVA - Pre renal. Dry on exam. Gentle fluids. Hold Zoloft. Dm type II - SS. A1C 6.9 in Dec which is very well controlled in an 81year old. HTN- no longer on medications it appears from our PTA med list other than propranolol.      HLD - fenofibrate/statin    Local wound care to left heel     DNR    DVT prophylaxis - SCDs  Signed By: Anya Hauser DO   February 23, 2022

## 2022-02-23 NOTE — PROGRESS NOTES
Consult Received:       Closed left hip femoral neck  fracture. I have notified Dr. Sheldon Taylor. Will tentatively plan for surgery tomorrow. Hospitalist to admit. NPO after midnight. Hold blood thinners.

## 2022-02-23 NOTE — ED TRIAGE NOTES
Pt BIB EMS from [facility] reports fall out of wheelchair around 0300. Alert and oriented at baseline. Reports left hip pain, xray from facility shows left femoral neck fracture with lateral displacement. PMS intact. Reports hitting her head when she fell, on eliquis.

## 2022-02-23 NOTE — ED NOTES
TRANSFER - OUT REPORT:    Verbal report given to lily gautam (name) on Geovanny Bolden  being transferred to 5 (unit) for routine progression of care       Report consisted of patients Situation, Background, Assessment and   Recommendations(SBAR). Information from the following report(s) ED Summary was reviewed with the receiving nurse. Lines:   Peripheral IV 02/23/22 Left Antecubital (Active)        Opportunity for questions and clarification was provided.       Patient transported with:  Ignacio

## 2022-02-23 NOTE — ED PROVIDER NOTES
Patient presents to the ER after having a fall. Presents from nursing facility. Apparently she had a fall in the middle the night. States this happened after she was using the bathroom. Believes she \"took a bad step\". States she did fall landing on her left hip and hitting her head. Has had left hip pain since then. Apparently she did have an x-ray performed at the facility concerning for possible left hip fracture. Was transported by EMS here. The history is provided by the patient. Fall  The accident occurred 3 to 5 hours ago. The fall occurred while walking. She fell from a height of ground level. The point of impact was the left hip. The pain is present in the left hip. The pain is at a severity of 4/10. The pain is moderate. She was not ambulatory at the scene. Associated symptoms include headaches. Pertinent negatives include no fever, no numbness, no abdominal pain, no bowel incontinence, no nausea, no extremity weakness, no loss of consciousness, no tingling and no laceration. The risk factors include being elderly. The symptoms are aggravated by activity. Past Medical History:   Diagnosis Date    Chronic kidney disease     renal insufficiency    [facility] [address] [facility]     Diabetes ([facility] [address] 11/13/2014    Hypertension     Obesity 7/26/2016    Psychiatric disorder     depeession       Past Surgical History:   Procedure Laterality Date    HX CHOLECYSTECTOMY           No family history on file.     Social History     Socioeconomic History    Marital status:      Spouse name: Not on file    Number of children: Not on file    Years of education: Not on file    Highest education level: Not on file   Occupational History    Not on file   Tobacco Use    Smoking status: Never Smoker    Smokeless tobacco: Not on file   Substance and Sexual Activity    Alcohol use: No    Drug use: Not on file    Sexual activity: Not on file   Other Topics Concern    Not on file   Social History Narrative    Not on file     Social Determinants of Health     Financial Resource Strain:     Difficulty of Paying Living Expenses: Not on file   Food Insecurity:     Worried About Running Out of Food in the Last Year: Not on file    Boni of Food in the Last Year: Not on file   Transportation Needs:     Lack of Transportation (Medical): Not on file    Lack of Transportation (Non-Medical): Not on file   Physical Activity:     Days of Exercise per Week: Not on file    Minutes of Exercise per Session: Not on file   Stress:     Feeling of Stress : Not on file   Social Connections:     Frequency of Communication with Friends and Family: Not on file    Frequency of Social Gatherings with Friends and Family: Not on file    Attends [employer] Services: Not on file    Active Member of [address] [place] [address] or Organizations: Not on file    Attends Club or Organization Meetings: Not on file    Marital Status: Not on file   Intimate Partner Violence:     Fear of Current or Ex-Partner: Not on file    Emotionally Abused: Not on file    Physically Abused: Not on file    Sexually Abused: Not on file   Housing Stability:     Unable to Pay for Housing in the Last Year: Not on file    Number of [place] in the Last Year: Not on file    Unstable Housing in the Last Year: Not on file         ALLERGIES: Ciprocinonide, Citalopram, Pioglitazone, Celecoxib, Codeine, Lisinopril, and Metronidazole    Review of Systems   Constitutional: Negative for fatigue and fever. HENT: Negative for congestion, dental problem, trouble swallowing and voice change. Eyes: Negative for photophobia, redness and visual disturbance. Respiratory: Negative for cough, chest tightness and shortness of breath. Cardiovascular: Negative for palpitations and leg swelling. Gastrointestinal: Negative for abdominal pain, bowel incontinence and nausea. Endocrine: Negative for polyphagia and polyuria.    Genitourinary: Negative for decreased urine volume and urgency. Musculoskeletal: Positive for myalgias. Negative for back pain, extremity weakness and gait problem. Skin: Negative for color change and pallor. Neurological: Positive for headaches. Negative for tingling, tremors, loss of consciousness, weakness, light-headedness and numbness. Hematological: Negative for adenopathy. Does not bruise/bleed easily. Psychiatric/Behavioral: Negative for behavioral problems and confusion. All other systems reviewed and are negative. Vitals:    02/23/22 1107   BP: 133/80   Pulse: (!) 58   Resp: 20   Temp: 97.8 °F (36.6 °C)   SpO2: 92%   Weight: 67.1 kg (148 lb)   Height: 5' 1\" (1.549 m)            Physical Exam  Vitals and nursing note reviewed. Constitutional:       General: She is not in acute distress. Appearance: Normal appearance. She is not ill-appearing. HENT:      Head: Normocephalic and atraumatic. Right Ear: External ear normal.      Left Ear: External ear normal.      Nose: Nose normal. No congestion or rhinorrhea. Mouth/Throat:      Mouth: Mucous membranes are dry. Pharynx: No oropharyngeal exudate or posterior oropharyngeal erythema. Eyes:      Extraocular Movements: Extraocular movements intact. Pupils: Pupils are equal, round, and reactive to light. Cardiovascular:      Rate and Rhythm: Normal rate and regular rhythm. Pulses: Normal pulses. Heart sounds: Normal heart sounds. No murmur heard. No friction rub. Pulmonary:      Effort: Pulmonary effort is normal. No respiratory distress. Breath sounds: Normal breath sounds. Abdominal:      General: Abdomen is flat. There is no distension. Palpations: There is no mass. Tenderness: There is no abdominal tenderness. Hernia: No hernia is present. Musculoskeletal:         General: Tenderness present. No deformity. Cervical back: Normal range of motion and neck supple. No rigidity or tenderness.       Left hip: Tenderness present. Decreased range of motion. Right lower leg: No edema. Skin:     Coloration: Skin is not jaundiced or pale. Findings: No bruising, erythema or laceration. Neurological:      General: No focal deficit present. Mental Status: She is alert and oriented to person, place, and time. Cranial Nerves: No cranial nerve deficit. Sensory: No sensory deficit. Motor: No weakness. Coordination: Coordination normal.   Psychiatric:         Mood and Affect: Mood normal.         Behavior: Behavior normal.          MDM  Number of Diagnoses or Management Options  Acute renal failure, unspecified acute renal failure type ([address]  Closed displaced fracture of left femoral neck ([address]  Fall, initial encounter  Diagnosis management comments: Left hip is shortened. Will obtain x-ray here, basic labs. She does report possibly some head trauma so will obtain CT scan of head. She is otherwise well-appearing. Neurovascularly intact distally    12:45 PM  Labs show hemoglobin 9.5, previous was 11. Creatinine elevated at 1.9, previous 1.2. Will start IV fluids. Will discuss case with hospitalist as well as orthopedics as x-ray does show a femoral neck fracture. She is on Eliquis for chronic A. fib and did have a dose this morning.        Amount and/or Complexity of Data Reviewed  Clinical lab tests: ordered and reviewed  Tests in the radiology section of CPT®: ordered and reviewed  Review and summarize past medical records: yes  Independent visualization of images, tracings, or specimens: yes (EKG interpretation: Sinus bradycardia, rate of 57, normal axis, low voltage, no acute ST segment changes none)    Risk of Complications, Morbidity, and/or Mortality  Presenting problems: moderate  Diagnostic procedures: moderate  Management options: high  General comments: High risk due to use of parenteral narcotic medication           Procedures          Results Include:    Recent Results (from the past 24 hour(s))   CBC WITH AUTOMATED DIFF    Collection Time: 02/23/22 11:21 AM   Result Value Ref Range    WBC 7.0 4.3 - 11.1 K/uL    RBC 3.22 (L) 4.05 - 5.2 M/uL    HGB 9.5 (L) 11.7 - 15.4 g/dL    HCT 31.4 (L) 35.8 - 46.3 %    MCV 97.5 79.6 - 97.8 FL    MCH 29.5 26.1 - 32.9 PG    MCHC 30.3 (L) 31.4 - 35.0 g/dL    RDW 14.4 11.9 - 14.6 %    PLATELET 124 (L) 115 - 450 K/uL    MPV 12.1 9.4 - 12.3 FL    ABSOLUTE NRBC 0.00 0.0 - 0.2 K/uL    DF AUTOMATED      NEUTROPHILS 75 43 - 78 %    LYMPHOCYTES 14 13 - 44 %    MONOCYTES 6 4.0 - 12.0 %    EOSINOPHILS 4 0.5 - 7.8 %    BASOPHILS 0 0.0 - 2.0 %    IMMATURE GRANULOCYTES 1 0.0 - 5.0 %    ABS. NEUTROPHILS 5.2 1.7 - 8.2 K/UL    ABS. LYMPHOCYTES 1.0 0.5 - 4.6 K/UL    ABS. MONOCYTES 0.4 0.1 - 1.3 K/UL    ABS. EOSINOPHILS 0.3 0.0 - 0.8 K/UL    ABS. BASOPHILS 0.0 0.0 - 0.2 K/UL    ABS. IMM. GRANS. 0.0 0.0 - 0.5 K/UL   METABOLIC PANEL, COMPREHENSIVE    Collection Time: 02/23/22 11:21 AM   Result Value Ref Range    Sodium 136 136 - 145 mmol/L    Potassium 5.0 3.5 - 5.1 mmol/L    Chloride 104 98 - 107 mmol/L    CO2 29 21 - 32 mmol/L    Anion gap 3 (L) 7 - 16 mmol/L    Glucose 146 (H) 65 - 100 mg/dL    BUN 42 (H) 8 - 23 MG/DL    Creatinine 1.90 (H) 0.6 - 1.0 MG/DL    GFR est AA 32 (L) >60 ml/min/1.73m2    GFR est non-AA 27 (L) >60 ml/min/1.73m2    Calcium 9.1 8.3 - 10.4 MG/DL    Bilirubin, total 0.4 0.2 - 1.1 MG/DL    ALT (SGPT) 21 12 - 65 U/L    AST (SGOT) 35 15 - 37 U/L    Alk. phosphatase 45 (L) 50 - 136 U/L    Protein, total 6.5 6.3 - 8.2 g/dL    Albumin 3.1 (L) 3.2 - 4.6 g/dL    Globulin 3.4 2.3 - 3.5 g/dL    A-G Ratio 0.9 (L) 1.2 - 3.5       Voice dictation software was used during the making of this note. This software is not perfect and grammatical and other typographical errors may be present. This note has been proofread, but may still contain errors.   Benigno Shaw MD; 2/23/2022 @12:45 PM   ===================================================================    I wore appropriate PPE throughout this patient's ED visit.  Anil Arias MD, 12:45 PM

## 2022-02-23 NOTE — ACP (ADVANCE CARE PLANNING)
[facility] Hospitalist Service  At the heart of better care     Advance Care Planning   Admit Date:  2022 11:08 AM   Name:  Stevan Restrepo   Age:  81 y.o. Sex:  female  :  1935   MRN:  [id number]   Room:  [place] Siddharth is able to make her own decisions: Yes    If pt unable to make decisions, POA/surrogate decision maker:  Daughter    Other members present in the meeting:   Daughter    Patient / surrogate decision-maker directed:  Code Status: DO NOT RESUSCITATE, DO NOT INTUBATE -okay for other aggressive medical and surgical intervention        Patient or surrogate consented to discussion of the current conditions, workup, management plans, prognosis, and understand the risk for further deterioration. Time spent: 18 minutes in direct discussion (face to face and/or over phone).     Signed:  Radha Mcguire DO

## 2022-02-23 NOTE — PROGRESS NOTES
Chart review complete, CM met with pt and son Urbano Brambila [phone] at bedside, pt found laying on stretcher alert and able to answer questions, per son pt recently moved to [facility] for LTC and will return post surgery for fx. Per son pt is normally in [facility] a facility but also has a walker for use if wants to. Demographics, insurance and PCP confirmed    Per notes pt is planned for surgery in am with Dr Chato Menjivar. Referral placed for pt to return to [facility] at time of DC, pending response. CM will remain available able to assist as needed. Care Management Interventions  PCP Verified by CM:  Yes (Dr Alis Calderón last visit 3 weeks ago per pt)  Transition of Care Consult (CM Consult): Discharge [facility] (current resident for [facility] at [facility])  [address] [place] Ara Ricci: No  Discharge Durable Medical Equipment: No  Physical Therapy Consult: No  Occupational Therapy Consult: No  Speech Therapy Consult: No  Support Systems: Child(abdiaziz)  Confirm Follow Up Transport: Family  The Plan for Transition of Care is Related to the Following Treatment Goals : STR/LTC at time of DC  The Patient and/or Patient Representative was Provided with a Choice of Provider and Agrees with the Discharge Plan?: Yes  Name of the Patient Representative Who was Provided with a Choice of Provider and Agrees with the Discharge Plan: son/pt  Freedom of Choice List was Provided with Basic Dialogue that Supports the Patient's Individualized Plan of Care/Goals, Treatment Preferences and Shares the Quality Data Associated with the Providers?: Yes  Discharge Location  Patient [facility]s [facility] be Discharged to[facility]de[facility]

## 2022-02-24 ENCOUNTER — APPOINTMENT (OUTPATIENT)
Dept: NON INVASIVE DIAGNOSTICS | Age: 87
DRG: 522 | End: 2022-02-24
Attending: FAMILY MEDICINE
Payer: MEDICARE

## 2022-02-24 ENCOUNTER — ANESTHESIA (OUTPATIENT)
Dept: SURGERY | Age: 87
DRG: 522 | End: 2022-02-24
Payer: MEDICARE

## 2022-02-24 LAB
ANION GAP SERPL CALC-SCNC: 3 MMOL/L (ref 7–16)
ANION GAP SERPL CALC-SCNC: 4 MMOL/L (ref 7–16)
BUN SERPL-MCNC: 33 MG/DL (ref 8–23)
BUN SERPL-MCNC: 35 MG/DL (ref 8–23)
CALCIUM SERPL-MCNC: 8 MG/DL (ref 8.3–10.4)
CALCIUM SERPL-MCNC: 8.3 MG/DL (ref 8.3–10.4)
CHLORIDE SERPL-SCNC: 106 MMOL/L (ref 98–107)
CHLORIDE SERPL-SCNC: 108 MMOL/L (ref 98–107)
CO2 SERPL-SCNC: 26 MMOL/L (ref 21–32)
CO2 SERPL-SCNC: 28 MMOL/L (ref 21–32)
CREAT SERPL-MCNC: 1.4 MG/DL (ref 0.6–1)
CREAT SERPL-MCNC: 1.4 MG/DL (ref 0.6–1)
ECHO AO ASC DIAM: 3 CM
ECHO AO ASCENDING AORTA INDEX: 1.82 CM/M2
ECHO AO ROOT DIAM: 3.1 CM
ECHO AO ROOT INDEX: 1.88 CM/M2
ECHO AV AREA PEAK VELOCITY: 1.9 CM2
ECHO AV AREA VTI: 2 CM2
ECHO AV AREA/BSA PEAK VELOCITY: 1.2 CM2/M2
ECHO AV AREA/BSA VTI: 1.2 CM2/M2
ECHO AV MEAN GRADIENT: 4 MMHG
ECHO AV MEAN VELOCITY: 0.9 M/S
ECHO AV PEAK GRADIENT: 7 MMHG
ECHO AV PEAK VELOCITY: 1.3 M/S
ECHO AV VELOCITY RATIO: 0.62
ECHO AV VTI: 29.2 CM
ECHO EST RA PRESSURE: 3 MMHG
ECHO IVC PROX: 1.2 CM
ECHO LA AREA 2C: 15 CM2
ECHO LA AREA 4C: 14.2 CM2
ECHO LA DIAMETER INDEX: 2.18 CM/M2
ECHO LA DIAMETER: 3.6 CM
ECHO LA MAJOR AXIS: 5.2 CM
ECHO LA MINOR AXIS: 5.1 CM
ECHO LA TO AORTIC ROOT RATIO: 1.16
ECHO LA VOL BP: 33 ML (ref 22–52)
ECHO LA VOL/BSA BIPLANE: 20 ML/M2 (ref 16–34)
ECHO LV E' LATERAL VELOCITY: 11 CM/S
ECHO LV E' SEPTAL VELOCITY: 8 CM/S
ECHO LV EDV A2C: 55 ML
ECHO LV EDV A4C: 85 ML
ECHO LV EDV INDEX A4C: 52 ML/M2
ECHO LV EDV NDEX A2C: 33 ML/M2
ECHO LV EJECTION FRACTION A2C: 69 %
ECHO LV EJECTION FRACTION A4C: 66 %
ECHO LV EJECTION FRACTION BIPLANE: 68 % (ref 55–100)
ECHO LV ESV A2C: 17 ML
ECHO LV ESV A4C: 29 ML
ECHO LV ESV INDEX A2C: 10 ML/M2
ECHO LV ESV INDEX A4C: 18 ML/M2
ECHO LV FRACTIONAL SHORTENING: 37 % (ref 28–44)
ECHO LV INTERNAL DIMENSION DIASTOLE INDEX: 2.61 CM/M2
ECHO LV INTERNAL DIMENSION DIASTOLIC: 4.3 CM (ref 3.9–5.3)
ECHO LV INTERNAL DIMENSION SYSTOLIC INDEX: 1.64 CM/M2
ECHO LV INTERNAL DIMENSION SYSTOLIC: 2.7 CM
ECHO LV IVSD: 1 CM (ref 0.6–0.9)
ECHO LV MASS 2D: 152.6 G (ref 67–162)
ECHO LV MASS INDEX 2D: 92.5 G/M2 (ref 43–95)
ECHO LV POSTERIOR WALL DIASTOLIC: 1.1 CM (ref 0.6–0.9)
ECHO LV RELATIVE WALL THICKNESS RATIO: 0.51
ECHO LVOT AREA: 3.1 CM2
ECHO LVOT AV VTI INDEX: 0.64
ECHO LVOT DIAM: 2 CM
ECHO LVOT MEAN GRADIENT: 1 MMHG
ECHO LVOT PEAK GRADIENT: 3 MMHG
ECHO LVOT PEAK VELOCITY: 0.8 M/S
ECHO LVOT STROKE VOLUME INDEX: 35.8 ML/M2
ECHO LVOT SV: 59 ML
ECHO LVOT VTI: 18.8 CM
ECHO MV A VELOCITY: 0.88 M/S
ECHO MV E DECELERATION TIME (DT): 193 MS
ECHO MV E VELOCITY: 0.88 M/S
ECHO MV E/A RATIO: 1
ECHO MV E/E' LATERAL: 8
ECHO MV E/E' RATIO (AVERAGED): 9.5
ECHO MV E/E' SEPTAL: 11
ECHO PV ACCELERATION TIME (AT): 108 MS
ECHO PV MAX VELOCITY: 0.9 M/S
ECHO PV PEAK GRADIENT: 3 MMHG
ECHO RIGHT VENTRICULAR SYSTOLIC PRESSURE (RVSP): 57 MMHG
ECHO RV BASAL DIMENSION: 3.2 CM
ECHO RV INTERNAL DIMENSION: 3 CM
ECHO RV MID DIMENSION: 2.6 CM
ECHO RV TAPSE: 1.4 CM (ref 1.5–2)
ECHO TV REGURGITANT MAX VELOCITY: 3.67 M/S
ECHO TV REGURGITANT PEAK GRADIENT: 54 MMHG
ERYTHROCYTE [DISTWIDTH] IN BLOOD BY AUTOMATED COUNT: 14.4 % (ref 11.9–14.6)
GLUCOSE BLD STRIP.AUTO-MCNC: 205 MG/DL (ref 65–100)
GLUCOSE BLD STRIP.AUTO-MCNC: 208 MG/DL (ref 65–100)
GLUCOSE BLD STRIP.AUTO-MCNC: 59 MG/DL (ref 65–100)
GLUCOSE BLD STRIP.AUTO-MCNC: 63 MG/DL (ref 65–100)
GLUCOSE BLD STRIP.AUTO-MCNC: 96 MG/DL (ref 65–100)
GLUCOSE SERPL-MCNC: 57 MG/DL (ref 65–100)
GLUCOSE SERPL-MCNC: 66 MG/DL (ref 65–100)
HCT VFR BLD AUTO: 38.4 % (ref 35.8–46.3)
HGB BLD-MCNC: 11.9 G/DL (ref 11.7–15.4)
MCH RBC QN AUTO: 29 PG (ref 26.1–32.9)
MCHC RBC AUTO-ENTMCNC: 31 G/DL (ref 31.4–35)
MCV RBC AUTO: 93.7 FL (ref 79.6–97.8)
MM INDURATION POC: 0 MM (ref 0–5)
NRBC # BLD: 0 K/UL (ref 0–0.2)
PLATELET # BLD AUTO: 111 K/UL (ref 150–450)
PMV BLD AUTO: 12.4 FL (ref 9.4–12.3)
POTASSIUM SERPL-SCNC: 4.2 MMOL/L (ref 3.5–5.1)
POTASSIUM SERPL-SCNC: 5.7 MMOL/L (ref 3.5–5.1)
PPD POC: NEGATIVE NEGATIVE
RBC # BLD AUTO: 4.1 M/UL (ref 4.05–5.2)
SERVICE CMNT-IMP: ABNORMAL
SERVICE CMNT-IMP: NORMAL
SODIUM SERPL-SCNC: 137 MMOL/L (ref 136–145)
SODIUM SERPL-SCNC: 138 MMOL/L (ref 136–145)
WBC # BLD AUTO: 8.3 K/UL (ref 4.3–11.1)

## 2022-02-24 PROCEDURE — 36415 COLL VENOUS BLD VENIPUNCTURE: CPT

## 2022-02-24 PROCEDURE — 74011250636 HC RX REV CODE- 250/636: Performed by: FAMILY MEDICINE

## 2022-02-24 PROCEDURE — 74011250637 HC RX REV CODE- 250/637: Performed by: INTERNAL MEDICINE

## 2022-02-24 PROCEDURE — 80048 BASIC METABOLIC PNL TOTAL CA: CPT

## 2022-02-24 PROCEDURE — 74011000250 HC RX REV CODE- 250: Performed by: INTERNAL MEDICINE

## 2022-02-24 PROCEDURE — 74011000250 HC RX REV CODE- 250: Performed by: FAMILY MEDICINE

## 2022-02-24 PROCEDURE — 74011636637 HC RX REV CODE- 636/637: Performed by: FAMILY MEDICINE

## 2022-02-24 PROCEDURE — 74011250637 HC RX REV CODE- 250/637: Performed by: FAMILY MEDICINE

## 2022-02-24 PROCEDURE — 65270000029 HC RM PRIVATE

## 2022-02-24 PROCEDURE — 2709999900 HC NON-CHARGEABLE SUPPLY

## 2022-02-24 PROCEDURE — 82962 GLUCOSE BLOOD TEST: CPT

## 2022-02-24 PROCEDURE — 99221 1ST HOSP IP/OBS SF/LOW 40: CPT | Performed by: ORTHOPAEDIC SURGERY

## 2022-02-24 PROCEDURE — 85027 COMPLETE CBC AUTOMATED: CPT

## 2022-02-24 PROCEDURE — 74011636637 HC RX REV CODE- 636/637: Performed by: INTERNAL MEDICINE

## 2022-02-24 PROCEDURE — 74011250636 HC RX REV CODE- 250/636: Performed by: INTERNAL MEDICINE

## 2022-02-24 PROCEDURE — C8929 TTE W OR WO FOL WCON,DOPPLER: HCPCS

## 2022-02-24 RX ORDER — DEXTROSE 40 %
15 GEL (GRAM) ORAL AS NEEDED
Status: DISCONTINUED | OUTPATIENT
Start: 2022-02-24 | End: 2022-02-28 | Stop reason: HOSPADM

## 2022-02-24 RX ORDER — CEFAZOLIN SODIUM/WATER 2 G/20 ML
2 SYRINGE (ML) INTRAVENOUS
Status: COMPLETED | OUTPATIENT
Start: 2022-02-24 | End: 2022-02-25

## 2022-02-24 RX ORDER — DEXTROSE MONOHYDRATE 50 MG/ML
100 INJECTION, SOLUTION INTRAVENOUS CONTINUOUS
Status: DISCONTINUED | OUTPATIENT
Start: 2022-02-24 | End: 2022-02-26

## 2022-02-24 RX ORDER — DEXTROSE MONOHYDRATE 100 MG/ML
125-250 INJECTION, SOLUTION INTRAVENOUS AS NEEDED
Status: DISCONTINUED | OUTPATIENT
Start: 2022-02-24 | End: 2022-02-28 | Stop reason: HOSPADM

## 2022-02-24 RX ORDER — INSULIN LISPRO 100 [IU]/ML
10 INJECTION, SOLUTION INTRAVENOUS; SUBCUTANEOUS ONCE
Status: COMPLETED | OUTPATIENT
Start: 2022-02-24 | End: 2022-02-24

## 2022-02-24 RX ORDER — DEXTROSE MONOHYDRATE 100 MG/ML
125 INJECTION, SOLUTION INTRAVENOUS ONCE
Status: COMPLETED | OUTPATIENT
Start: 2022-02-24 | End: 2022-02-24

## 2022-02-24 RX ADMIN — ACETAMINOPHEN 650 MG: 325 TABLET ORAL at 08:24

## 2022-02-24 RX ADMIN — DEXTROSE MONOHYDRATE 100 ML/HR: 5 INJECTION, SOLUTION INTRAVENOUS at 09:26

## 2022-02-24 RX ADMIN — SODIUM ZIRCONIUM CYCLOSILICATE 10 G: 10 POWDER, FOR SUSPENSION ORAL at 09:35

## 2022-02-24 RX ADMIN — PROPRANOLOL HYDROCHLORIDE 20 MG: 10 TABLET ORAL at 16:37

## 2022-02-24 RX ADMIN — GABAPENTIN 100 MG: 100 CAPSULE ORAL at 08:24

## 2022-02-24 RX ADMIN — DEXTROSE MONOHYDRATE 125 ML: 10 INJECTION, SOLUTION INTRAVENOUS at 06:39

## 2022-02-24 RX ADMIN — PRAVASTATIN SODIUM 80 MG: 80 TABLET ORAL at 21:35

## 2022-02-24 RX ADMIN — DEXTROSE MONOHYDRATE 100 ML/HR: 5 INJECTION, SOLUTION INTRAVENOUS at 21:34

## 2022-02-24 RX ADMIN — SODIUM CHLORIDE 50 ML/HR: 900 INJECTION, SOLUTION INTRAVENOUS at 05:34

## 2022-02-24 RX ADMIN — OXYCODONE HYDROCHLORIDE 5 MG: 5 TABLET ORAL at 14:46

## 2022-02-24 RX ADMIN — SODIUM CHLORIDE, PRESERVATIVE FREE 10 ML: 5 INJECTION INTRAVENOUS at 21:40

## 2022-02-24 RX ADMIN — LATANOPROST 1 DROP: 50 SOLUTION OPHTHALMIC at 21:36

## 2022-02-24 RX ADMIN — ACETAMINOPHEN 650 MG: 325 TABLET ORAL at 21:43

## 2022-02-24 RX ADMIN — INSULIN LISPRO 10 UNITS: 100 INJECTION, SOLUTION INTRAVENOUS; SUBCUTANEOUS at 09:56

## 2022-02-24 RX ADMIN — GABAPENTIN 100 MG: 100 CAPSULE ORAL at 14:46

## 2022-02-24 RX ADMIN — SODIUM CHLORIDE, PRESERVATIVE FREE 10 ML: 5 INJECTION INTRAVENOUS at 21:36

## 2022-02-24 RX ADMIN — SODIUM CHLORIDE, PRESERVATIVE FREE 5 ML: 5 INJECTION INTRAVENOUS at 14:10

## 2022-02-24 RX ADMIN — DEXTROSE 125 ML: 10 SOLUTION INTRAVENOUS at 11:05

## 2022-02-24 RX ADMIN — PERFLUTREN 1 ML: 6.52 INJECTION, SUSPENSION INTRAVENOUS at 08:18

## 2022-02-24 RX ADMIN — GABAPENTIN 100 MG: 100 CAPSULE ORAL at 21:35

## 2022-02-24 RX ADMIN — Medication 4 UNITS: at 21:40

## 2022-02-24 RX ADMIN — PROPRANOLOL HYDROCHLORIDE 20 MG: 10 TABLET ORAL at 08:24

## 2022-02-24 RX ADMIN — Medication 4 UNITS: at 16:37

## 2022-02-24 NOTE — PROGRESS NOTES
Physician Progress Note      Ambar Jensen  CSN #:                  [id number]  :                       1935  ADMIT DATE:       2022 11:08 AM  DISCH DATE:  RESPONDING  PROVIDER #:        GRISELDA DUNN DO          QUERY TEXT:    Patient admitted with a Left fefmur/hip fx noted to have  atrial fibrillation and is maintained on Eliquis. If possible, please document in progress notes and discharge summary if you are evaluating and/or treating any of the following: The medical record reflects the following:  Risk Factors: HTN, CKD3, Paroxysmal afib, HLD, DM  Clinical Indicators: hx of afib on eliquis. Treatment: eliquis, labs, tele, vital signs, holding eliquis for surgery  Options provided:  -- Secondary hypercoagulable state in a patient with atrial fibrillation  -- Other - I will add my own diagnosis  -- Disagree - Not applicable / Not valid  -- Disagree - Clinically unable to determine / Unknown  -- Refer to Clinical Documentation Reviewer    PROVIDER RESPONSE TEXT:    This patient has secondary hypercoagulable state related to atrial fibrillation.     Query created by: Leia Henley on 2022 8:33 AM      Electronically signed by:  Bro Mata DO 2022 8:44 AM

## 2022-02-24 NOTE — CONSULTS
Consult    Patient: Isidro Bean MRN: [id number]  SSN: [id number]    YOB: 1935  Age: 81 y.o. Sex: female      Subjective:      Isidro Bean is a 81 y.o. female who fell and injured her left hip. Apparently she is been inpatient at a facility and she had tested positive for Covid on February 4. She had a repeat screening test here and is still positive but is having no symptoms in the nursing administrator has advised us that she does not need to be in isolation. She denies any cough or other cold type symptoms at this point no shortness of breath. I have asked her if she is complaining of anything other than her left hip pain she has no problem with her right lower extremity or no problems with her bilateral upper extremities. She is able to give up a fairly reasonable history. She does take Eliquis for atrial fibrillation.     Past Medical History:   Diagnosis Date    Chronic kidney disease     renal insufficiency    Diabetes ([facility]     Diabetes ([facility] [address] 11/13/2014    Hypertension     Obesity 7/26/2016    Psychiatric disorder     depeession     Past Surgical History:   Procedure Laterality Date    HX CHOLECYSTECTOMY        FAMHX -No history of inflammatory arthritis   Social History     Tobacco Use    Smoking status: Never Smoker    Smokeless tobacco: Not on file   Substance Use Topics    Alcohol use: No      Current Facility-Administered Medications   Medication Dose Route Frequency Provider Last Rate Last Admin    calcium carbonate (OS-CHELSEY) tablet 500 mg [elemental]  500 mg Oral DAILY Madina Burton DO        fenofibrate (LOFIBRA) tablet 160 mg  160 mg Oral DAILY Matty Art DO        gabapentin (NEURONTIN) capsule 100 mg  100 mg Oral TID Matty Art DO   100 mg at 02/23/22 2159    latanoprost (XALATAN) 0.005 % ophthalmic solution 1 Drop  1 Drop Both Eyes QHS Matty Reach, DO   1 Drop at 02/23/22 2159    cetirizine (ZYRTEC) tablet 5 mg  5 mg Oral DAILY Gareld Sims, DO        pravastatin (PRAVACHOL) tablet 80 mg  80 mg Oral QHS Gareld Sims, DO   80 mg at 02/23/22 2159    propranoloL (INDERAL) tablet 20 mg  20 mg Oral BID Gareld Sims, DO   20 mg at 02/23/22 1801    sodium chloride (NS) flush 5-40 mL  5-40 mL IntraVENous Q8H Gareld Sims, DO   10 mL at 02/23/22 1658    sodium chloride (NS) flush 5-40 mL  5-40 mL IntraVENous PRN Gareld Sims, DO        acetaminophen (TYLENOL) tablet 650 mg  650 mg Oral Q6H PRN Gareld Sims, DO   650 mg at 02/23/22 2203    Or    acetaminophen (TYLENOL) suppository 650 mg  650 mg Rectal Q6H PRN Gareld Sims, DO        polyethylene glycol (MIRALAX) packet 17 g  17 g Oral DAILY PRN Gareld Sims, DO        ondansetron (ZOFRAN ODT) tablet 4 mg  4 mg Oral Q8H PRN Gareld Sims, DO        Or    ondansetron [facility]) injection 4 mg  4 mg IntraVENous Q6H PRN Gareld Sims, DO        insulin lispro (HUMALOG) injection   SubCUTAneous AC&HS Gareld Sims, DO        tuberculin injection 5 Units  5 Units IntraDERMal Elaine Mojica, DO   5 Units at 02/23/22 1810    oxyCODONE IR (ROXICODONE) tablet 5 mg  5 mg Oral Q6H PRN Gareld Sims, DO   5 mg at 02/23/22 1805    morphine injection 1 mg  1 mg IntraVENous Q12H PRN Gareld Sims, DO        0.9% sodium chloride infusion  50 mL/hr IntraVENous CONTINUOUS Gareld Sims, DO 50 mL/hr at 02/24/22 0534 50 mL/hr at 02/24/22 0534        Allergies   Allergen Reactions    Ciprocinonide Unknown (comments)     Cant remember    Citalopram Other (comments)     Cant remember    Pioglitazone Other (comments)     Cant remember    Celecoxib Nausea and Vomiting    Codeine Nausea and Vomiting    Lisinopril Cough    Metronidazole Nausea and Vomiting       Review of Systems:  A comprehensive review of systems was negative except for that written in the History of Present Illness.     Objective:     Vitals:    02/23/22 1705 02/23/22 2047 02/23/22 2308 02/24/22 0339   BP: (!) 150/62 113/70 (!) 124/59 (!) 117/48   Pulse: 65 63 (!) 57 (!) 58   Resp: 16 18 16 20   Temp: 97.6 °F (36.4 °C) 98.6 °F (37 °C) 97.5 °F (36.4 °C) 97.7 °F (36.5 °C)   SpO2: 98% 95% 97% 96%   Weight:       Height:            Physical Exam:  Physical Exam:  General:  Alert, cooperative, no distress, appears stated age. Orientation she is alert and oriented at least to person   Eyes:  Conjunctivae/corneas clear. PERRL, EOMs intact. Fundi benign   Ears:  Normal TMs and external ear canals both ears. Nose: Nares normal. Septum midline. Mucosa normal. No drainage or sinus tenderness. Mouth/Throat: Lips, mucosa, and tongue normal. Teeth and gums normal.   Neck: Supple, symmetrical, trachea midline, no adenopathy, thyroid: no enlargment/tenderness/nodules, no carotid bruit and no JVD. Back:   Symmetric, no curvature. ROM normal. No CVA tenderness. Lungs:   Clear to auscultation bilaterally. Heart:  Regular rate and rhythm, S1, S2 normal, no murmur, click, rub or gallop. Abdomen:   Soft, non-tender. Bowel sounds normal. No masses,  No organomegaly. No lymphadenopathy in all 4 extremities  Alignment- pt has some obvious deformity of the left hip  Range of motion- with any range of motion leftt hip  Vascular-distal pulses palpable in left lower extremity  Sensory/motor-deep tendon reflexes normal left lower extremity. Motor and sensory function intact.   Stability- is difficult to assess stability because of the presence of the fracture  Tenderness to palpation over the left greater trochanter area  Skin- no rashes ulcerations or open wounds left lower extremity  Gait- cannot put any weight on the left lower extremity      Assessment:     Hospital Problems  Never Reviewed          Codes Class Noted POA    * (Principal) Femur fracture, left ([facility] [address] ICD-10-CM: A15.73LV  ICD-9-CM: 821.00  2/23/2022 Unknown        CHAVA (acute kidney injury) ([facility] ICD-10-CM: N17.9  ICD-9-CM: 584.9  2/23/2022 Unknown        Thyroid enlargement ICD-10-CM: E04.9  ICD-9-CM: 240.9  12/13/2021 Yes        Essential hypertension with goal blood pressure less than 140/90 ICD-10-CM: I10  ICD-9-CM: 401.9  8/25/2016 Yes        Diabetes ([facility] [address] ICD-10-CM: E11.9  ICD-9-CM: 250.00  11/13/2014 Yes            Closed displaced left femoral neck fracture    Xrays and or studies:    AP lateral views left hip shows a closed displaced left femoral neck fracture  Plan:     I spoke with the patient regarding surgical options. She has a completely displaced left femoral neck fracture so I think the only reasonable alternative for her will be left hip hemiarthroplasty. I try to explain to her that this is something that sort of the worst case scenario for the hip fracture she has. She is obviously someone that has a lot of medical problems including diabetes and atrial fibrillation and takes Eliquis for this. I think she is at very high risk for surgical complication following left hip hemiarthroplasty for left femoral head fracture. I think she is at high risk for drainage and wound infection because of the diabetes and the fact that she takes Eliquis. I think it would be smart to try to hold her Eliquis for at least 3 to 4 days postoperatively knowing that we put her at increased risk for a complication of leaving her off of her Eliquis however she is such a high risk for developing surgical complication think this would be the most prudent thing to do hopefully this could allow us to restart her Eliquis as soon as possible as if she does develop significant drainage and has to go back to the operating room then she would have to be off Eliquis for even longer. I explained all this to her.   We will proceed with left hip hemiarthroplasty for left femoral neck fracture hopefully today in the operating room    Signed By: Chavez Stubbs MD

## 2022-02-24 NOTE — PROGRESS NOTES
02/23/22 1702   Skin Integumentary   Skin Integumentary (WDL) X    Pressure  Injury Documentation Pressure Injury Noted-See Wound LDA to Document   Skin Integrity Wound (add Wound LDA); Laceration (comment)  (L Heel and R shin)   Turgor Epidermis thin w/ loss of subcut tissue   Skin Condition/Temp Dry;Fragile; Warm     L heel pressure ulcer, R shin skin tear, sacral redness.
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
## 2022-02-24 NOTE — PROGRESS NOTES
Daughter in patient's room, Emma Puckett [place] with Dr. Marti An came up and informed the patient and daughter that they would not be able to do surgery today, but would schedule her for first thing in the AM at 0730. Ordered diet. NPO after MN.

## 2022-02-24 NOTE — PROGRESS NOTES
CM following for ongoing discharge planning. CM spoke with facility liaison regarding patient admitting to [facility] as rapid Covid testing resulted positive. Per liaison, patient resides in LTC and tested positive while at facility on 2/4/2022; therefore can return to facility. [address] [place] [address] accepted and selected for STR at time of discharge. Therapy evaluations should be initiated tomorrow, 2/25/2022. PPD placed yesterday, 2/23/2022. Facility can accept patient back as early as Monday. CM will continue to follow for ongoing discharge planning. Care Management Interventions  PCP Verified by CM:  Yes (Dr Shukri Boss last visit 3 weeks ago per pt)  Transition of Care Consult (CM Consult): [facility] for [facility] at [address] [place] [address])  [address] [place]. [place]: No  Discharge Durable Medical Equipment: No  Physical Therapy Consult: No  Occupational Therapy Consult: No  Speech Therapy Consult: No  Support Systems: Child(abdiaziz),Caregiver/Home Care Staff  Confirm Follow Up Transport: Family  The Plan for Transition of Care is Related to the Following Treatment Goals : STR/LTC at time of DC  The Patient and/or Patient Representative was Provided with a Choice of Provider and Agrees with the Discharge Plan?: Yes  Name of the Patient Representative Who was Provided with a Choice of Provider and Agrees with the Discharge Plan: son/pt  Freedom of Choice List was Provided with Basic Dialogue that Supports the Patient's Individualized Plan of Care/Goals, Treatment Preferences and Shares the Quality Data Associated with the Providers?: Yes  Discharge Location  Patient Expects to be Discharged to[de-identified] [facility]

## 2022-02-24 NOTE — ANESTHESIA PREPROCEDURE EVALUATION
Relevant Problems   CARDIOVASCULAR   (+) Atrial fibrillation with RVR (HCC)   (+) Essential hypertension with goal blood pressure less than 140/90      RENAL FAILURE   (+) CHAVA (acute kidney injury) ([address]      ENDOCRINE   (+) Diabetes (HCC)   (+) Obesity       Anesthetic History   No history of anesthetic complications            Review of Systems / Medical History  Patient summary reviewed and pertinent labs reviewed    Pulmonary  Within defined limits                 Neuro/Psych         Psychiatric history (Depression)     Cardiovascular    Hypertension: well controlled        Dysrhythmias (on Eliquis) : atrial fibrillation      Exercise tolerance: <4 METS: Ambulates with walker  Comments: Echo pending (ordered by hospitalist)  Last Echo 2014 - normal EF, mild MR/TR    Last dose Eliquis either 2/22 pm or 2/23 am   GI/Hepatic/Renal         Renal disease (Cr 1.9): CRI       Endo/Other        Anemia (Hgb 9.5)     Other Findings   Comments: Covid+, but nurse stated that pt was originally positive on 2/4, no respiratory sx's           Physical Exam    Airway  Mallampati: II  TM Distance: 4 - 6 cm  Neck ROM: normal range of motion   Mouth opening: Normal     Cardiovascular    Rhythm: regular  Rate: normal         Dental  No notable dental hx       Pulmonary  Breath sounds clear to auscultation               Abdominal         Other Findings            Anesthetic Plan    ASA: 3  Anesthesia type: general            Anesthetic plan and risks discussed with: Patient and Son / Daughter      Recent Eliquis

## 2022-02-24 NOTE — PROGRESS NOTES
Problem: Falls - Risk of  Goal: *Absence of Falls  Description: Document [place] Fall Risk and appropriate interventions in the flowsheet.   Outcome: Progressing Towards Goal  Note: Fall Risk Interventions:       Mentation Interventions: Bed/chair exit alarm    Medication Interventions: Bed/chair exit alarm    Elimination Interventions: Bed/chair exit alarm    History of Falls Interventions: Bed/chair exit alarm

## 2022-02-25 ENCOUNTER — APPOINTMENT (OUTPATIENT)
Dept: GENERAL RADIOLOGY | Age: 87
DRG: 522 | End: 2022-02-25
Attending: ORTHOPAEDIC SURGERY
Payer: MEDICARE

## 2022-02-25 LAB
ANION GAP SERPL CALC-SCNC: 7 MMOL/L (ref 7–16)
BUN SERPL-MCNC: 24 MG/DL (ref 8–23)
CALCIUM SERPL-MCNC: 8.1 MG/DL (ref 8.3–10.4)
CHLORIDE SERPL-SCNC: 101 MMOL/L (ref 98–107)
CO2 SERPL-SCNC: 25 MMOL/L (ref 21–32)
CREAT SERPL-MCNC: 1.3 MG/DL (ref 0.6–1)
ERYTHROCYTE [DISTWIDTH] IN BLOOD BY AUTOMATED COUNT: 14.4 % (ref 11.9–14.6)
EST. AVERAGE GLUCOSE BLD GHB EST-MCNC: 166 MG/DL
GLUCOSE BLD STRIP.AUTO-MCNC: 111 MG/DL (ref 65–100)
GLUCOSE BLD STRIP.AUTO-MCNC: 135 MG/DL (ref 65–100)
GLUCOSE BLD STRIP.AUTO-MCNC: 154 MG/DL (ref 65–100)
GLUCOSE BLD STRIP.AUTO-MCNC: 173 MG/DL (ref 65–100)
GLUCOSE BLD STRIP.AUTO-MCNC: 225 MG/DL (ref 65–100)
GLUCOSE BLD STRIP.AUTO-MCNC: 246 MG/DL (ref 65–100)
GLUCOSE BLD STRIP.AUTO-MCNC: 354 MG/DL (ref 65–100)
GLUCOSE BLD STRIP.AUTO-MCNC: 54 MG/DL (ref 65–100)
GLUCOSE SERPL-MCNC: 210 MG/DL (ref 65–100)
HBA1C MFR BLD: 7.4 % (ref 4.2–6.3)
HCT VFR BLD AUTO: 34.9 % (ref 35.8–46.3)
HGB BLD-MCNC: 10.9 G/DL (ref 11.7–15.4)
MCH RBC QN AUTO: 28.9 PG (ref 26.1–32.9)
MCHC RBC AUTO-ENTMCNC: 31.2 G/DL (ref 31.4–35)
MCV RBC AUTO: 92.6 FL (ref 79.6–97.8)
NRBC # BLD: 0 K/UL (ref 0–0.2)
PLATELET # BLD AUTO: 105 K/UL (ref 150–450)
PMV BLD AUTO: 12.1 FL (ref 9.4–12.3)
POTASSIUM SERPL-SCNC: 4.2 MMOL/L (ref 3.5–5.1)
RBC # BLD AUTO: 3.77 M/UL (ref 4.05–5.2)
SERVICE CMNT-IMP: ABNORMAL
SODIUM SERPL-SCNC: 133 MMOL/L (ref 136–145)
WBC # BLD AUTO: 6.3 K/UL (ref 4.3–11.1)

## 2022-02-25 PROCEDURE — 77030017016 HC DSG ANTIMIC BARR2 S&N -B: Performed by: ORTHOPAEDIC SURGERY

## 2022-02-25 PROCEDURE — 82962 GLUCOSE BLOOD TEST: CPT

## 2022-02-25 PROCEDURE — 74011636637 HC RX REV CODE- 636/637: Performed by: FAMILY MEDICINE

## 2022-02-25 PROCEDURE — 74011636637 HC RX REV CODE- 636/637: Performed by: ANESTHESIOLOGY

## 2022-02-25 PROCEDURE — 74011250637 HC RX REV CODE- 250/637: Performed by: ORTHOPAEDIC SURGERY

## 2022-02-25 PROCEDURE — 77030018673: Performed by: ORTHOPAEDIC SURGERY

## 2022-02-25 PROCEDURE — 77030040922 HC BLNKT HYPOTHRM STRY -A: Performed by: ANESTHESIOLOGY

## 2022-02-25 PROCEDURE — 77030006835 HC BLD SAW SAG STRY -B: Performed by: ORTHOPAEDIC SURGERY

## 2022-02-25 PROCEDURE — 83036 HEMOGLOBIN GLYCOSYLATED A1C: CPT

## 2022-02-25 PROCEDURE — 72170 X-RAY EXAM OF PELVIS: CPT

## 2022-02-25 PROCEDURE — 73502 X-RAY EXAM HIP UNI 2-3 VIEWS: CPT

## 2022-02-25 PROCEDURE — 2709999900 HC NON-CHARGEABLE SUPPLY: Performed by: ORTHOPAEDIC SURGERY

## 2022-02-25 PROCEDURE — 74011250636 HC RX REV CODE- 250/636: Performed by: ORTHOPAEDIC SURGERY

## 2022-02-25 PROCEDURE — 0SRS01Z REPLACEMENT OF LEFT HIP JOINT, FEMORAL SURFACE WITH METAL SYNTHETIC SUBSTITUTE, OPEN APPROACH: ICD-10-PCS | Performed by: ORTHOPAEDIC SURGERY

## 2022-02-25 PROCEDURE — 76060000034 HC ANESTHESIA 1.5 TO 2 HR: Performed by: ORTHOPAEDIC SURGERY

## 2022-02-25 PROCEDURE — 76210000006 HC OR PH I REC 0.5 TO 1 HR: Performed by: ORTHOPAEDIC SURGERY

## 2022-02-25 PROCEDURE — 77030018836 HC SOL IRR NACL ICUM -A: Performed by: ORTHOPAEDIC SURGERY

## 2022-02-25 PROCEDURE — 74011000250 HC RX REV CODE- 250: Performed by: ORTHOPAEDIC SURGERY

## 2022-02-25 PROCEDURE — 36415 COLL VENOUS BLD VENIPUNCTURE: CPT

## 2022-02-25 PROCEDURE — 74011250636 HC RX REV CODE- 250/636: Performed by: NURSE ANESTHETIST, CERTIFIED REGISTERED

## 2022-02-25 PROCEDURE — 74011000250 HC RX REV CODE- 250: Performed by: INTERNAL MEDICINE

## 2022-02-25 PROCEDURE — C1776 JOINT DEVICE (IMPLANTABLE): HCPCS | Performed by: ORTHOPAEDIC SURGERY

## 2022-02-25 PROCEDURE — 77030031139 HC SUT VCRL2 J&J -A: Performed by: ORTHOPAEDIC SURGERY

## 2022-02-25 PROCEDURE — 77030010509 HC AIRWY LMA MSK TELE -A: Performed by: ANESTHESIOLOGY

## 2022-02-25 PROCEDURE — 65270000029 HC RM PRIVATE

## 2022-02-25 PROCEDURE — 74011000258 HC RX REV CODE- 258: Performed by: ORTHOPAEDIC SURGERY

## 2022-02-25 PROCEDURE — 74011636637 HC RX REV CODE- 636/637: Performed by: ORTHOPAEDIC SURGERY

## 2022-02-25 PROCEDURE — 77030033067 HC SUT PDO STRATFX SPIR J&J -B: Performed by: ORTHOPAEDIC SURGERY

## 2022-02-25 PROCEDURE — 76010000162 HC OR TIME 1.5 TO 2 HR INTENSV-TIER 1: Performed by: ORTHOPAEDIC SURGERY

## 2022-02-25 PROCEDURE — 74011000250 HC RX REV CODE- 250: Performed by: NURSE ANESTHETIST, CERTIFIED REGISTERED

## 2022-02-25 PROCEDURE — 85027 COMPLETE CBC AUTOMATED: CPT

## 2022-02-25 PROCEDURE — 74011250637 HC RX REV CODE- 250/637: Performed by: FAMILY MEDICINE

## 2022-02-25 PROCEDURE — 80048 BASIC METABOLIC PNL TOTAL CA: CPT

## 2022-02-25 PROCEDURE — 27236 TREAT THIGH FRACTURE: CPT | Performed by: ORTHOPAEDIC SURGERY

## 2022-02-25 DEVICE — LOW FRICTION ION TREATMENT
Type: IMPLANTABLE DEVICE | Site: HIP | Status: FUNCTIONAL
Brand: C-TAPER HEAD

## 2022-02-25 DEVICE — HIP STEM
Type: IMPLANTABLE DEVICE | Site: HIP | Status: FUNCTIONAL
Brand: OMNIFIT

## 2022-02-25 DEVICE — BIPOLAR COMPONENT
Type: IMPLANTABLE DEVICE | Site: HIP | Status: FUNCTIONAL
Brand: UHR

## 2022-02-25 DEVICE — HIP H4 HEMI UNI BIPLR -- IMPL CAPPED H4: Type: IMPLANTABLE DEVICE | Status: FUNCTIONAL

## 2022-02-25 RX ORDER — FENTANYL CITRATE 50 UG/ML
INJECTION, SOLUTION INTRAMUSCULAR; INTRAVENOUS AS NEEDED
Status: DISCONTINUED | OUTPATIENT
Start: 2022-02-25 | End: 2022-02-25 | Stop reason: HOSPADM

## 2022-02-25 RX ORDER — PROPOFOL 10 MG/ML
INJECTION, EMULSION INTRAVENOUS AS NEEDED
Status: DISCONTINUED | OUTPATIENT
Start: 2022-02-25 | End: 2022-02-25 | Stop reason: HOSPADM

## 2022-02-25 RX ORDER — CEFAZOLIN SODIUM/WATER 2 G/20 ML
2 SYRINGE (ML) INTRAVENOUS ONCE
Status: COMPLETED | OUTPATIENT
Start: 2022-02-25 | End: 2022-02-25

## 2022-02-25 RX ORDER — MAG HYDROX/ALUMINUM HYD/SIMETH 200-200-20
30 SUSPENSION, ORAL (FINAL DOSE FORM) ORAL
Status: DISCONTINUED | OUTPATIENT
Start: 2022-02-25 | End: 2022-02-28 | Stop reason: HOSPADM

## 2022-02-25 RX ORDER — INSULIN LISPRO 100 [IU]/ML
10 INJECTION, SOLUTION INTRAVENOUS; SUBCUTANEOUS ONCE
Status: COMPLETED | OUTPATIENT
Start: 2022-02-25 | End: 2022-02-25

## 2022-02-25 RX ORDER — VANCOMYCIN HYDROCHLORIDE 1 G/20ML
INJECTION, POWDER, LYOPHILIZED, FOR SOLUTION INTRAVENOUS AS NEEDED
Status: DISCONTINUED | OUTPATIENT
Start: 2022-02-25 | End: 2022-02-25 | Stop reason: HOSPADM

## 2022-02-25 RX ORDER — SODIUM CHLORIDE 0.9 % (FLUSH) 0.9 %
5-40 SYRINGE (ML) INJECTION AS NEEDED
Status: DISCONTINUED | OUTPATIENT
Start: 2022-02-25 | End: 2022-02-28 | Stop reason: HOSPADM

## 2022-02-25 RX ORDER — SODIUM CHLORIDE, SODIUM LACTATE, POTASSIUM CHLORIDE, CALCIUM CHLORIDE 600; 310; 30; 20 MG/100ML; MG/100ML; MG/100ML; MG/100ML
75 INJECTION, SOLUTION INTRAVENOUS CONTINUOUS
Status: DISCONTINUED | OUTPATIENT
Start: 2022-02-25 | End: 2022-02-26

## 2022-02-25 RX ORDER — EPHEDRINE SULFATE/0.9% NACL/PF 50 MG/5 ML
SYRINGE (ML) INTRAVENOUS AS NEEDED
Status: DISCONTINUED | OUTPATIENT
Start: 2022-02-25 | End: 2022-02-25 | Stop reason: HOSPADM

## 2022-02-25 RX ORDER — OXYCODONE HYDROCHLORIDE 5 MG/1
5 TABLET ORAL
Status: DISCONTINUED | OUTPATIENT
Start: 2022-02-25 | End: 2022-02-28

## 2022-02-25 RX ORDER — ONDANSETRON 2 MG/ML
INJECTION INTRAMUSCULAR; INTRAVENOUS AS NEEDED
Status: DISCONTINUED | OUTPATIENT
Start: 2022-02-25 | End: 2022-02-25 | Stop reason: HOSPADM

## 2022-02-25 RX ORDER — MORPHINE SULFATE 4 MG/ML
2 INJECTION INTRAVENOUS
Status: DISCONTINUED | OUTPATIENT
Start: 2022-02-25 | End: 2022-02-28

## 2022-02-25 RX ORDER — FERROUS SULFATE, DRIED 160(50) MG
1 TABLET, EXTENDED RELEASE ORAL
Status: DISCONTINUED | OUTPATIENT
Start: 2022-02-25 | End: 2022-02-28 | Stop reason: HOSPADM

## 2022-02-25 RX ORDER — SODIUM CHLORIDE 0.9 % (FLUSH) 0.9 %
5-40 SYRINGE (ML) INJECTION EVERY 8 HOURS
Status: DISCONTINUED | OUTPATIENT
Start: 2022-02-25 | End: 2022-02-28 | Stop reason: HOSPADM

## 2022-02-25 RX ORDER — LIDOCAINE HYDROCHLORIDE 20 MG/ML
INJECTION, SOLUTION EPIDURAL; INFILTRATION; INTRACAUDAL; PERINEURAL AS NEEDED
Status: DISCONTINUED | OUTPATIENT
Start: 2022-02-25 | End: 2022-02-25 | Stop reason: HOSPADM

## 2022-02-25 RX ADMIN — Medication 10 MG: at 11:18

## 2022-02-25 RX ADMIN — PHENYLEPHRINE HYDROCHLORIDE 120 MCG: 10 INJECTION INTRAVENOUS at 11:09

## 2022-02-25 RX ADMIN — Medication 4 UNITS: at 07:23

## 2022-02-25 RX ADMIN — SODIUM CHLORIDE, PRESERVATIVE FREE 10 ML: 5 INJECTION INTRAVENOUS at 14:48

## 2022-02-25 RX ADMIN — SODIUM CHLORIDE, PRESERVATIVE FREE 10 ML: 5 INJECTION INTRAVENOUS at 21:52

## 2022-02-25 RX ADMIN — SODIUM CHLORIDE, SODIUM LACTATE, POTASSIUM CHLORIDE, AND CALCIUM CHLORIDE 75 ML/HR: 600; 310; 30; 20 INJECTION, SOLUTION INTRAVENOUS at 09:09

## 2022-02-25 RX ADMIN — PHENYLEPHRINE HYDROCHLORIDE 120 MCG: 10 INJECTION INTRAVENOUS at 11:18

## 2022-02-25 RX ADMIN — SODIUM CHLORIDE, PRESERVATIVE FREE 5 ML: 5 INJECTION INTRAVENOUS at 15:34

## 2022-02-25 RX ADMIN — PHENYLEPHRINE HYDROCHLORIDE 120 MCG: 10 INJECTION INTRAVENOUS at 12:10

## 2022-02-25 RX ADMIN — LIDOCAINE HYDROCHLORIDE 60 MG: 20 INJECTION, SOLUTION EPIDURAL; INFILTRATION; INTRACAUDAL; PERINEURAL at 11:02

## 2022-02-25 RX ADMIN — FENTANYL CITRATE 25 MCG: 50 INJECTION INTRAMUSCULAR; INTRAVENOUS at 12:03

## 2022-02-25 RX ADMIN — LATANOPROST 1 DROP: 50 SOLUTION OPHTHALMIC at 21:56

## 2022-02-25 RX ADMIN — CEFAZOLIN 2 G: 10 INJECTION, POWDER, FOR SOLUTION INTRAVENOUS at 05:53

## 2022-02-25 RX ADMIN — SODIUM CHLORIDE, PRESERVATIVE FREE 10 ML: 5 INJECTION INTRAVENOUS at 22:06

## 2022-02-25 RX ADMIN — PRAVASTATIN SODIUM 80 MG: 80 TABLET ORAL at 21:50

## 2022-02-25 RX ADMIN — FENTANYL CITRATE 50 MCG: 50 INJECTION INTRAMUSCULAR; INTRAVENOUS at 10:55

## 2022-02-25 RX ADMIN — INSULIN LISPRO 10 UNITS: 100 INJECTION, SOLUTION INTRAVENOUS; SUBCUTANEOUS at 09:26

## 2022-02-25 RX ADMIN — PROPRANOLOL HYDROCHLORIDE 20 MG: 10 TABLET ORAL at 07:22

## 2022-02-25 RX ADMIN — Medication 2 UNITS: at 21:50

## 2022-02-25 RX ADMIN — DEXTROSE: 10 SOLUTION INTRAVENOUS at 12:31

## 2022-02-25 RX ADMIN — OXYCODONE HYDROCHLORIDE 5 MG: 5 TABLET ORAL at 14:45

## 2022-02-25 RX ADMIN — Medication 20 MG: at 11:09

## 2022-02-25 RX ADMIN — FENTANYL CITRATE 25 MCG: 50 INJECTION INTRAMUSCULAR; INTRAVENOUS at 11:58

## 2022-02-25 RX ADMIN — ACETAMINOPHEN 650 MG: 325 TABLET ORAL at 23:23

## 2022-02-25 RX ADMIN — Medication 2 G: at 11:16

## 2022-02-25 RX ADMIN — Medication 20 MG: at 11:07

## 2022-02-25 RX ADMIN — PHENYLEPHRINE HYDROCHLORIDE 240 MCG: 10 INJECTION INTRAVENOUS at 11:07

## 2022-02-25 RX ADMIN — GABAPENTIN 100 MG: 100 CAPSULE ORAL at 14:46

## 2022-02-25 RX ADMIN — GABAPENTIN 100 MG: 100 CAPSULE ORAL at 21:52

## 2022-02-25 RX ADMIN — DEXTROSE MONOHYDRATE 100 ML/HR: 5 INJECTION, SOLUTION INTRAVENOUS at 21:50

## 2022-02-25 RX ADMIN — CEFAZOLIN SODIUM 1 G: 1 INJECTION, POWDER, FOR SOLUTION INTRAMUSCULAR; INTRAVENOUS at 18:40

## 2022-02-25 RX ADMIN — PROPOFOL 100 MG: 10 INJECTION, EMULSION INTRAVENOUS at 11:02

## 2022-02-25 RX ADMIN — FENTANYL CITRATE 25 MCG: 50 INJECTION INTRAMUSCULAR; INTRAVENOUS at 11:52

## 2022-02-25 RX ADMIN — ONDANSETRON 4 MG: 2 INJECTION INTRAMUSCULAR; INTRAVENOUS at 11:21

## 2022-02-25 NOTE — INTERVAL H&P NOTE
Update History & Physical    The Patient's History and Physical of 2/23/2022 was reviewed with the patient and I examined the patient. There was no change. The surgical site was confirmed by the patient and me. Plan:  The risk, benefits, expected outcome, and alternative to the recommended procedure have been discussed with the patient. Patient understands and wants to proceed with left hip hemiarthroplasty for left femoral neck.   Electronically signed by Anoop Harris MD on 2/25/2022 at 10:29 AM

## 2022-02-25 NOTE — PERIOP NOTES
TRANSFER - OUT REPORT:    Verbal report given to Margot RN on Valentine Andujar  being transferred to  for routine post - op       Report consisted of patients Situation, Background, Assessment and   Recommendations(SBAR). Information from the following report(s) SBAR, OR Summary, Procedure Summary, Intake/Output and MAR was reviewed with the receiving nurse. Lines:   Peripheral IV 02/23/22 Left Antecubital (Active)   Site Assessment Clean, dry, & intact 02/25/22 1246   Phlebitis Assessment 0 02/25/22 1246   Infiltration Assessment 0 02/25/22 1246   Dressing Status Clean, dry, & intact 02/25/22 1246   Dressing Type Tape;Transparent 02/25/22 1246   Hub Color/Line Status Pink 02/25/22 1246   Action Taken Dressing reinforced 02/23/22 1702   Alcohol Cap Used No 02/25/22 1246       Peripheral IV 02/25/22 Anterior;Distal;Left Forearm (Active)   Site Assessment Clean, dry, & intact 02/25/22 1246   Phlebitis Assessment 0 02/25/22 1246   Infiltration Assessment 0 02/25/22 1246   Dressing Status Clean, dry, & intact 02/25/22 1246   Dressing Type Tape;Transparent 02/25/22 1246   Hub Color/Line Status Blue 02/25/22 1246   Alcohol Cap Used No 02/25/22 1246        Opportunity for questions and clarification was provided. Patient transported with:   O2 @ 2 liters    VTE prophylaxis orders have been written for Valentine Andujar. Patient and family given floor number and nurses name. Family updated re: pt status after security code verified.

## 2022-02-25 NOTE — PROGRESS NOTES
Hospitalist Progress Note   Admit Date:  2022 11:08 AM   Name:  Kendra Land   Age:  81 y.o. Sex:  female  :  1935   MRN:  [id number]   Room:  726/01    Presenting Complaint: Fall    Reason(s) for Admission: Femur fracture, left ([address] [S72.92XA]  CHAVA (acute kidney injury) [facility]) [N17.9]     Hospital Course & Interval History:   Patient is a 81 y.o. female who presented to the ED for cc left hip pain after a fall when tripping over her wheel chair. Denies syncope or dizziness. Hx of DM type II, HTN, HLD, depression, CKD stage 3, paroxysmal a fib on Eliquis (unsure if her last dose was last night or this AM), and left heel wound. Currently at rehab center for generalized weakness.      Vitals- stable     Labs- Hg 9.5 (was 11 on 21)--denies obvious bleeding, platelets 431, creatine 1.9 from baseline 1.4     Hip x ray with left femoral neck fracture    Subjective/24hr Events (22): Patient seen and examined immediately postoperatively and patient continues to be somewhat drowsy and a poor historian at this time. Unable to participate my examination but does manage to deny any pain. ROS:  10 systems reviewed and negative except as noted above. Assessment & Plan:   Left femoral neck fracture - Consult ortho. Will allow to eat since typically we hold Eliquis 48 hours before surgery. At moderate risk for surgical and post surgical complications. Will repeat ECHO since RV pressure noted to be elevated in . No hx of CHF.   -orthopedic surgery consulted and holding Eliquis. Activity level weightbearing status surgical intervention and anticoagulation are per orthopedic surgery  -surgical intervention today. Weightbearing status anticoagulation and pain management per orthopedic surgery      Hyperkalemia-due to pending surgical intervention today we will start patient on one-time dose of IV insulin follow-up with D5W to avoid hypoglycemia.   Additionally will give the patient a dose of Lokelma. Will monitor closely. Subsequent BMP obtained at 10 AM demonstrated resolution of hypokalemia with initiation of treatment  2/25-resolved     CHAVA - Pre renal. Dry on exam. Gentle fluids. Hold Zoloft. 2/24-improving however not to baseline. Continue IV fluids and monitor BMP closely  2/25-resolved with baseline. Given patient n.p.o. for most of the day we will continue IV fluids and consider discontinuation in a.m.     Dm type II - SS.  A1C 6.9 in Dec which is very well controlled in an 81year old.      HTN- no longer on medications it appears from our PTA med list other than propranolol.      HLD - fenofibrate/statin     Local wound care to left heel      DNR     DVT prophylaxis - SCDs    Diet: N.p.o.    Code status: DNR    Hospital Problems as of 2/25/2022 Never Reviewed          Codes Class Noted - Resolved POA    * (Principal) Femur fracture, left ([facility] ICD-10-CM: L34.56NC  ICD-9-CM: 821.00  2/23/2022 - Present Unknown        CHAVA (acute kidney injury) ([facility] ICD-10-CM: N17.9  ICD-9-CM: 584.9  2/23/2022 - Present Unknown        Thyroid enlargement ICD-10-CM: E04.9  ICD-9-CM: 240.9  12/13/2021 - Present Yes        Essential hypertension with goal blood pressure less than 140/90 ICD-10-CM: I10  ICD-9-CM: 401.9  8/25/2016 - Present Yes        Diabetes ([facility] ICD-10-CM: E11.9  ICD-9-CM: 250.00  11/13/2014 - Present Yes              Objective:     Patient Vitals for the past 24 hrs:   Temp Pulse Resp BP SpO2   02/25/22 1436 98.2 °F (36.8 °C) (!) 55 18 104/77 94 %   02/25/22 1324  (!) 58 16 (!) 142/63 96 %   02/25/22 1319 97.8 °F (36.6 °C) (!) 57 18 (!) 142/63 97 %   02/25/22 1314  (!) 58 16 (!) 140/63 97 %   02/25/22 1310  (!) 59 16 (!) 147/62 97 %   02/25/22 1305  (!) 57 16 (!) 149/67 99 %   02/25/22 1300  60 16 137/61 100 %   02/25/22 1254  (!) 57 18 (!) 137/56 100 %   02/25/22 1249  (!) 57 16 (!) 135/58 100 %   02/25/22 1246 97.8 °F (36.6 °C) (!) 59 16 (!) 110/52 100 %   02/25/22 0931 98 °F (36.7 °C) (!) 55 16 136/60 95 %   02/25/22 0739 97.6 °F (36.4 °C) 61 20 125/82 99 %   02/25/22 0403 97.7 °F (36.5 °C) 61 19 (!) 122/49 91 %   02/25/22 0005 97.7 °F (36.5 °C) 61 17 (!) 106/51 98 %   02/24/22 1918 98.6 °F (37 °C) 60 18 (!) 102/59 96 %   02/24/22 1613 98.4 °F (36.9 °C) 69  110/62 95 %     Oxygen Therapy  O2 Sat (%): 94 % (02/25/22 1436)  Pulse via Oximetry: 58 beats per minute (02/25/22 1324)  O2 Device: None (Room air) (02/25/22 1436)  Skin Assessment: Clean, dry, & intact (02/24/22 0720)  Skin Protection for O2 Device: No (02/24/22 0720)  O2 Flow Rate (L/min): 2 l/min (02/25/22 1319)    Estimated body mass index is 27.59 kg/m² as calculated from the following:    Height as of this encounter: 5' 1\" (1.549 m). Weight as of this encounter: 66.2 kg (146 lb). Intake/Output Summary (Last 24 hours) at 2/25/2022 1503  Last data filed at 2/25/2022 1246  Gross per 24 hour   Intake 750 ml   Output 2000 ml   Net -1250 ml         Physical Exam:     Blood pressure 104/77, pulse (!) 55, temperature 98.2 °F (36.8 °C), resp. rate 18, height 5' 1\" (1.549 m), weight 66.2 kg (146 lb), SpO2 94 %, not currently breastfeeding. General:    Well nourished. No overt distress  Head:  Normocephalic, atraumatic  Eyes:  Sclerae appear normal.  Pupils equally round. ENT:  Nares appear normal, no drainage. Moist oral mucosa  Neck:  No restricted ROM. Trachea midline   CV:   RRR. No m/r/g. No jugular venous distension. Lungs:   CTAB. No wheezing, rhonchi, or rales. Respirations even, unlabored  Abdomen: Bowel sounds present. Soft, nontender, nondistended. Extremities: No cyanosis or clubbing. No edema  Skin:     No rashes and normal coloration. Warm and dry. Neuro:  CN II-XII grossly intact. Sensation intact. A&Ox3  Psych:  Normal mood and affect.       I have reviewed ordered lab tests and independently visualized imaging below:    Recent Labs:  Recent Results (from the past 48 hour(s))   GLUCOSE, POC    Collection Time: 02/23/22  6:16 PM   Result Value Ref Range    Glucose (POC) 79 65 - 100 mg/dL    Performed by Argelia    COVID-19 RAPID TEST    Collection Time: 02/23/22  6:52 PM   Result Value Ref Range    Specimen source NASAL      COVID-19 rapid test Detected (AA) NOTD     GLUCOSE, POC    Collection Time: 02/23/22  9:36 PM   Result Value Ref Range    Glucose (POC) 140 (H) 65 - 100 mg/dL    Performed by [facility]    METABOLIC PANEL, BASIC    Collection Time: 02/24/22  5:01 AM   Result Value Ref Range    Sodium 137 136 - 145 mmol/L    Potassium 5.7 (H) 3.5 - 5.1 mmol/L    Chloride 108 (H) 98 - 107 mmol/L    CO2 26 21 - 32 mmol/L    Anion gap 3 (L) 7 - 16 mmol/L    Glucose 66 65 - 100 mg/dL    BUN 35 (H) 8 - 23 MG/DL    Creatinine 1.40 (H) 0.6 - 1.0 MG/DL    GFR est AA 46 (L) >60 ml/min/1.73m2    GFR est non-AA 38 (L) >60 ml/min/1.73m2    Calcium 8.3 8.3 - 10.4 MG/DL   CBC W/O DIFF    Collection Time: 02/24/22  5:01 AM   Result Value Ref Range    WBC 8.3 4.3 - 11.1 K/uL    RBC 4.10 4.05 - 5.2 M/uL    HGB 11.9 11.7 - 15.4 g/dL    HCT 38.4 35.8 - 46.3 %    MCV 93.7 79.6 - 97.8 FL    MCH 29.0 26.1 - 32.9 PG    MCHC 31.0 (L) 31.4 - 35.0 g/dL    RDW 14.4 11.9 - 14.6 %    PLATELET 592 (L) 271 - 450 K/uL    MPV 12.4 (H) 9.4 - 12.3 FL    ABSOLUTE NRBC 0.00 0.0 - 0.2 K/uL   GLUCOSE, POC    Collection Time: 02/24/22  6:13 AM   Result Value Ref Range    Glucose (POC) 63 (L) 65 - 100 mg/dL    Performed by [facility]    ECHO ADULT COMPLETE    Collection Time: 02/24/22  8:24 AM   Result Value Ref Range    LV EDV A2C 55 mL    LV EDV A4C 85 mL    LV ESV A2C 17 mL    LV ESV A4C 29 mL    IVSd 1.0 (A) 0.6 - 0.9 cm    LVIDd 4.3 3.9 - 5.3 cm    LVIDs 2.7 cm    LVOT Diameter 2.0 cm    LVOT Mean Gradient 1 mmHg    LVOT VTI 18.8 cm    LVOT Peak Velocity 0.8 m/s    LVOT Peak Gradient 3 mmHg    LVPWd 1.1 (A) 0.6 - 0.9 cm    LV E' Lateral Velocity 11 cm/s    LV E' Septal Velocity 8 cm/s    LV Ejection Fraction A2C 69 %    LV Ejection Fraction A4C 66 %    EF BP 68 55 - 100 %    LVOT Area 3.1 cm2    LVOT SV 59.0 ml    LA Minor Axis 5.1 cm    LA Major [place] 5.2 cm    LA Area 2C 15.0 cm2    LA Area 4C 14.2 cm2    LA Volume BP 33 22 - 52 mL    LA Diameter 3.6 cm    AV Mean Velocity 0.9 m/s    AV Mean Gradient 4 mmHg    AV VTI 29.2 cm    AV Peak Velocity 1.3 m/s    AV Peak Gradient 7 mmHg    AV Area by VTI 2.0 cm2    AV Area by Peak Velocity 1.9 cm2    Aortic Root 3.1 cm    Ascending Aorta 3.0 cm    IVC Proxmal 1.2 cm    MV E Wave Deceleration Time 193.0 ms    MV A Velocity 0.88 m/s    MV E Velocity 0.88 m/s    PV .0 ms    PV Max Velocity 0.9 m/s    PV Peak Gradient 3 mmHg    RVIDd 3.0 cm    RV Basal Dimension 3.2 cm    RV Mid Dimension 2.6 cm    TAPSE 1.4 (A) 1.5 - 2.0 cm    TR Max Velocity 3.67 m/s    TR Peak Gradient 54 mmHg    Fractional Shortening 2D 37 28 - 44 %    LV ESV Index A4C 18 mL/m2    LV EDV Index A4C 52 mL/m2    LV ESV Index A2C 10 mL/m2    LV EDV Index A2C 33 mL/m2    LVIDd Index 2.61 cm/m2    LVIDs Index 1.64 cm/m2    LV RWT Ratio 0.51     LV Mass 2D 152.6 67 - 162 g    LV Mass 2D Index 92.5 43 - 95 g/m2    MV E/A 1.00     E/E' Ratio (Averaged) 9.50     E/E' Lateral 8.00     E/E' Septal 11.00     LA Volume Index BP 20 16 - 34 ml/m2    LVOT Stroke Volume Index 35.8 mL/m2    LA Size Index 2.18 cm/m2    LA/AO Root Ratio 1.16     Ao Root Index 1.88 cm/m2    Ascending Aorta Index 1.82 cm/m2    AV Velocity Ratio 0.62     LVOT:AV VTI Index 0.64     ALEX/BSA VTI 1.2 cm2/m2    ALEX/BSA Peak Velocity 1.2 cm2/m2    Est. RA Pressure 3 mmHg    RVSP 57 mmHg   METABOLIC PANEL, BASIC    Collection Time: 02/24/22 10:55 AM   Result Value Ref Range    Sodium 138 136 - 145 mmol/L    Potassium 4.2 3.5 - 5.1 mmol/L    Chloride 106 98 - 107 mmol/L    CO2 28 21 - 32 mmol/L    Anion gap 4 (L) 7 - 16 mmol/L    Glucose 57 (L) 65 - 100 mg/dL    BUN 33 (H) 8 - 23 MG/DL    Creatinine 1.40 (H) 0.6 - 1.0 MG/DL    GFR est AA 46 (L) >60 ml/min/1.73m2    GFR est non-AA 38 (L) >60 ml/min/1.73m2    Calcium 8.0 (L) 8.3 - 10.4 MG/DL   GLUCOSE, POC    Collection Time: 02/24/22 10:58 AM   Result Value Ref Range    Glucose (POC) 59 (L) 65 - 100 mg/dL    Performed by [employer]    GLUCOSE, POC    Collection Time: 02/24/22 12:12 PM   Result Value Ref Range    Glucose (POC) 96 65 - 100 mg/dL    Performed by [employer]    GLUCOSE, POC    Collection Time: 02/24/22  4:15 PM   Result Value Ref Range    Glucose (POC) 205 (H) 65 - 100 mg/dL    Performed by [employer]    PLEASE READ & DOCUMENT PPD TEST IN 24 HRS    Collection Time: 02/24/22  5:42 PM   Result Value Ref Range    PPD Negative Negative    mm Induration 0 0 - 5 mm   GLUCOSE, POC    Collection Time: 02/24/22  8:15 PM   Result Value Ref Range    Glucose (POC) 208 (H) 65 - 100 mg/dL    Performed by Maria R    CBC W/O DIFF    Collection Time: 02/25/22  5:03 AM   Result Value Ref Range    WBC 6.3 4.3 - 11.1 K/uL    RBC 3.77 (L) 4.05 - 5.2 M/uL    HGB 10.9 (L) 11.7 - 15.4 g/dL    HCT 34.9 (L) 35.8 - 46.3 %    MCV 92.6 79.6 - 97.8 FL    MCH 28.9 26.1 - 32.9 PG    MCHC 31.2 (L) 31.4 - 35.0 g/dL    RDW 14.4 11.9 - 14.6 %    PLATELET 848 (L) 440 - 450 K/uL    MPV 12.1 9.4 - 12.3 FL    ABSOLUTE NRBC 0.00 0.0 - 0.2 K/uL   METABOLIC PANEL, BASIC    Collection Time: 02/25/22  5:03 AM   Result Value Ref Range    Sodium 133 (L) 136 - 145 mmol/L    Potassium 4.2 3.5 - 5.1 mmol/L    Chloride 101 98 - 107 mmol/L    CO2 25 21 - 32 mmol/L    Anion gap 7 7 - 16 mmol/L    Glucose 210 (H) 65 - 100 mg/dL    BUN 24 (H) 8 - 23 MG/DL    Creatinine 1.30 (H) 0.6 - 1.0 MG/DL    GFR est AA 50 (L) >60 ml/min/1.73m2    GFR est non-AA 41 (L) >60 ml/min/1.73m2    Calcium 8.1 (L) 8.3 - 10.4 MG/DL   HEMOGLOBIN A1C WITH EAG    Collection Time: 02/25/22  5:03 AM   Result Value Ref Range    Hemoglobin A1c 7.4 (H) 4.20 - 6.30 %    Est. average glucose 166 mg/dL   GLUCOSE, POC    Collection Time: 02/25/22  6:02 AM   Result Value Ref Range    Glucose (POC) 246 (H) 65 - 100 mg/dL    Performed by Maria R    GLUCOSE, POC    Collection Time: 02/25/22  9:05 AM   Result Value Ref Range    Glucose (POC) 354 (H) 65 - 100 mg/dL    Performed by Calin Bruce    GLUCOSE, POC    Collection Time: 02/25/22 10:33 AM   Result Value Ref Range    Glucose (POC) 173 (H) 65 - 100 mg/dL    Performed by Leeann    GLUCOSE, POC    Collection Time: 02/25/22 11:25 AM   Result Value Ref Range    Glucose (POC) 135 (H) 65 - 100 mg/dL    Performed by Carlito Boo, POC    Collection Time: 02/25/22 12:29 PM   Result Value Ref Range    Glucose (POC) 54 (L) 65 - 100 mg/dL    Performed by Carlito Boo, POC    Collection Time: 02/25/22 12:45 PM   Result Value Ref Range    Glucose (POC) 225 (H) 65 - 100 mg/dL    Performed by [facility]        All Micro Results     Procedure Component Value Units Date/Time    COVID-19 RAPID TEST [[id number]]  (Abnormal) Collected: 02/23/22 1852    Order Status: Completed Specimen: Nasopharyngeal Updated: 02/23/22 1934     Specimen source NASAL        COVID-19 rapid test Detected        Comment:      The specimen is POSITIVE for SARS-CoV-2, the novel coronavirus associated with COVID-19. This test has been authorized by the FDA under an Emergency Use Authorization (EUA) for use by authorized laboratories. Fact sheet for Healthcare Providers: ConventionUpdate.co.nz  Fact sheet for Patients: ConventionUpdate.co.nz       Methodology: Isothermal Nucleic Acid Amplification  RESULTS VERIFIED, PHONED TO AND READ BACK BY  SANDRA MUNOZ AT 1934 2.23.22 EOR               Other Studies:  XR PELV AP ONLY    Result Date: 2/25/2022  Pelvis INDICATION: Post hip replacement A supine view of the pelvis was obtained. The patient is post left hip replacement. Implant is well-positioned. There is no fracture or dislocation.  Irregular angulation of the subcapital aspect right femoral neck. Correlate with any right hip pain to suggest right hip fracture. Calcified uterine fibroid projects over the pelvis. 1. Post left hip replacement 2. Irregular angulation right femoral neck in the subcapital region of the proximal right femur. Correlate clinically for any evidence of right hip fracture.       Current Meds:  Current Facility-Administered Medications   Medication Dose Route Frequency    lactated Ringers infusion  75 mL/hr IntraVENous CONTINUOUS    morphine injection 2 mg  2 mg IntraVENous Q1H PRN    oxyCODONE IR (ROXICODONE) tablet 5 mg  5 mg Oral Q4H PRN    sodium chloride (NS) flush 5-40 mL  5-40 mL IntraVENous Q8H    sodium chloride (NS) flush 5-40 mL  5-40 mL IntraVENous PRN    ceFAZolin (ANCEF) 1 g in 0.9% sodium chloride (MBP/ADV) 50 mL MBP  1 g IntraVENous Q8H    alum-mag hydroxide-simeth (MYLANTA) oral suspension 30 mL  30 mL Oral Q4H PRN    calcium-vitamin D (OS-CHELSEY +D3) 500 mg-200 unit per tablet 1 Tablet  1 Tablet Oral TID WITH MEALS    dextrose 5% infusion  100 mL/hr IntraVENous CONTINUOUS    dextrose 40% (GLUTOSE) oral gel 1 Tube  15 g Oral PRN    glucagon (GLUCAGEN) injection 1 mg  1 mg IntraMUSCular PRN    dextrose 10% infusion 125-250 mL  125-250 mL IntraVENous PRN    fenofibrate (LOFIBRA) tablet 160 mg  160 mg Oral DAILY    gabapentin (NEURONTIN) capsule 100 mg  100 mg Oral TID    latanoprost (XALATAN) 0.005 % ophthalmic solution 1 Drop  1 Drop Both Eyes QHS    cetirizine (ZYRTEC) tablet 5 mg  5 mg Oral DAILY    pravastatin (PRAVACHOL) tablet 80 mg  80 mg Oral QHS    propranoloL (INDERAL) tablet 20 mg  20 mg Oral BID    sodium chloride (NS) flush 5-40 mL  5-40 mL IntraVENous Q8H    sodium chloride (NS) flush 5-40 mL  5-40 mL IntraVENous PRN    acetaminophen (TYLENOL) tablet 650 mg  650 mg Oral Q6H PRN    Or    acetaminophen (TYLENOL) suppository 650 mg  650 mg Rectal Q6H PRN    polyethylene glycol (MIRALAX) packet 17 g  17 g Oral DAILY PRN    ondansetron (ZOFRAN ODT) tablet 4 mg  4 mg Oral Q8H PRN    Or    ondansetron (ZOFRAN) injection 4 mg  4 mg IntraVENous Q6H PRN    insulin lispro (HUMALOG) injection   SubCUTAneous AC&HS       Signed:  Servando Moody DO    Part of this note may have been written by using a voice dictation software. The note has been proof read but may still contain some grammatical/other typographical errors.

## 2022-02-25 NOTE — ANESTHESIA POSTPROCEDURE EVALUATION
Procedure(s):  LEFT HIP HEMIARTHROPLASTY CHOICE TO BE ADMITTED. general    Anesthesia Post Evaluation      Multimodal analgesia: multimodal analgesia used between 6 hours prior to anesthesia start to PACU discharge  Patient location during evaluation: PACU  Patient participation: complete - patient participated  Level of consciousness: awake  Pain management: adequate  Airway patency: patent  Anesthetic complications: no  Cardiovascular status: acceptable  Respiratory status: acceptable, spontaneous ventilation and nonlabored ventilation  Hydration status: acceptable  Comments: Starting blood sugar was 354 prior to surgery- was given lispro, repeat check 1 hr after administration was 176. Recheck 45 min after that was 135, 45 minutes later it was 50. D10 was started and sugar upon arrival in PACU was ~225. Glucose level will need to be monitored regularly over the next several hours  Post anesthesia nausea and vomiting:  none      INITIAL Post-op Vital signs:   Vitals Value Taken Time   /63 02/25/22 1319   Temp 36.6 °C (97.8 °F) 02/25/22 1246   Pulse 61 02/25/22 1320   Resp 16 02/25/22 1314   SpO2 97 % 02/25/22 1320   Vitals shown include unvalidated device data.

## 2022-02-25 NOTE — PROGRESS NOTES
Patient may be weightbearing as tolerated on the left lower extremity. They can be full active and passive range of motion of the left hip. They can have dry dressing change starting on postoperative day 2 and then after that daily and as needed.   She can restart her Eliquis on postoperative day 3 which will be Monday

## 2022-02-25 NOTE — OP NOTES
Operative Report    Patient: Aviva Mckeon MRN: [id number]  SSN: [id number]    YOB: 1935  Age: 81 y.o. Sex: female       Date of Surgery: 2/25/2022     History:  Aviva Mckeon is a 81 y.o. female fell and injured her left hip. She was found to have a left femoral neck fracture in the emergency room. She was admitted and we have tried yesterday to get her to the operating room but with limited OR availability it was very difficult to do this. She is finally to be able to go and proceed with her surgical intervention today. We have talked with the patient and her family regarding the fact that she has completely displaced left femoral neck fracture and really only surgical option of getting a left hip hemiarthroplasty I try to explain she is very high risk for this because of her diabetes and the fact that she takes a thinner medication. So I do think she is very high risk but there is really no other good alternative. I talked to the patient and/or their representative and explained the exact nature the procedure. I also went through a detailed list of the material risks associated with  the procedure which included risk of bleeding, infection, injury to nearby structures, worsening the situation, as well as the risks associate with anesthesia and finally death. Also talked with him regarding the benefits and alternatives to the procedure.     Preoperative Diagnosis: Closed fracture of left hip, initial encounter ([email or web address] [S72.002A]     Postoperative Diagnosis: Cl[facility] of left [facility] [S72.002A]     Surgeon(s) and Role:     Demetrice Trujillo MD - Primary    Anesthesia: General     Procedure: Procedure(s):  Left hip hemiarthroplasty for left femoral neck fracture/open treatment left femoral neck fracture with prosthetic replacement    Procedure in Detail: After successful induction of general anesthesia patient was placed in the right lateral decubitus position the left hip was prepped draped usual sterile fashion I then utilized a standard anterolateral approach to the osteoporotic femoral neck. The femoral neck was exposed and I made a provisional cut with an oscillating saw I then used a power corkscrew to remove the femoral head from the acetabulum. After this was done we sized this for a 42 mm femoral head. I then began the process of preparing the proximal femur using first a box osteotome then a canal finder and then starting with a #5 broach and ending with a #6 broach. The #6 broach appeared to fill the canal very well. I then remove the broach and irrigated with a copious amount of normal saline for both the proximal femur as well as the acetabulum and then placed the actual stem corresponding with the size of the final broach. Once the stem was in place I placed a 28 mm head which was a +0 head as well as a 42 mm bipolar component and then reduced the hip and took the hip through full range of motion making sure that it was very stable. Once I was assured of this I then irrigated once more and reattached the gluteus minimus and medius tendons using #1 Vicryl in figure-of-eight fashion to suture the tendons directly back to the area of the proximal femur near the area of the greater trochanter. After this was done I closed the deep fascia with #2 strata fix suture and then the subcutaneous tissue with 2.0 strata fix suture and the skin was closed with staples. Dressings were applied. The patient was awakened and taken to PACU in stable condition        Estimated Blood Loss: 150 cc    Tourniquet Time: * No tourniquets in log *      Implants:   Implant Name Type Inv.  Item Serial No.  Lot No. LRB No. Used Action   STEM FEM SZ 6 L120MM NK L25MM 30MM OFFSET 132DEG ANG HIP CO - [id number]  STEM FEM SZ 6 L120MM NK L25MM 30MM OFFSET 132DEG ANG HIP CO  [employer] HOW_ [id number] Left 1 Implanted   HEAD FEM C-TAPR 28MM 0 NK -- LOW Dagoberto Murguia - [id number]  HEAD FEM C-TAPR 28MM 0 NK -- LOW FRIC COCR  [employer] HOWM_WD [id number] Left 1 Implanted   COMPONENT BPLR OD42MM ID28MM UNIV HIP HD SYS UHR - [id number]  COMPONENT BPLR OD42MM ID28MM UNIV HIP HD SYS UHR  [employer] HOW_WD [id number] Left 1 Implanted               Specimens: * No specimens in log *        Drains: None                Complications: None    Counts: Sponge and needle counts were correct times two.     Signed By:  Caleb Ruiz MD     February 25, 2022

## 2022-02-25 NOTE — INTERVAL H&P NOTE
Update History & Physical    The Patient's History and Physical of 2/23/2022 was reviewed with the patient and I examined the patient. There was no change. The surgical site was confirmed by the patient and me. Plan:  The risk, benefits, expected outcome, and alternative to the recommended procedure have been discussed with the patient. Patient understands and wants to proceed with left hip hemiarthroplasty for left femoral neck fracture.     Electronically signed by Michela Chase MD on 2/25/2022 at 7:17 AM

## 2022-02-26 LAB
ANION GAP SERPL CALC-SCNC: 6 MMOL/L (ref 7–16)
BASOPHILS # BLD: 0 K/UL (ref 0–0.2)
BASOPHILS NFR BLD: 1 % (ref 0–2)
BUN SERPL-MCNC: 23 MG/DL (ref 8–23)
CALCIUM SERPL-MCNC: 7.5 MG/DL (ref 8.3–10.4)
CHLORIDE SERPL-SCNC: 98 MMOL/L (ref 98–107)
CO2 SERPL-SCNC: 26 MMOL/L (ref 21–32)
CREAT SERPL-MCNC: 1.3 MG/DL (ref 0.6–1)
DIFFERENTIAL METHOD BLD: ABNORMAL
EOSINOPHIL # BLD: 0.1 K/UL (ref 0–0.8)
EOSINOPHIL NFR BLD: 2 % (ref 0.5–7.8)
ERYTHROCYTE [DISTWIDTH] IN BLOOD BY AUTOMATED COUNT: 14.4 % (ref 11.9–14.6)
GLUCOSE BLD STRIP.AUTO-MCNC: 145 MG/DL (ref 65–100)
GLUCOSE BLD STRIP.AUTO-MCNC: 203 MG/DL (ref 65–100)
GLUCOSE BLD STRIP.AUTO-MCNC: 204 MG/DL (ref 65–100)
GLUCOSE BLD STRIP.AUTO-MCNC: 82 MG/DL (ref 65–100)
GLUCOSE SERPL-MCNC: 108 MG/DL (ref 65–100)
HCT VFR BLD AUTO: 30.7 % (ref 35.8–46.3)
HGB BLD-MCNC: 9.7 G/DL (ref 11.7–15.4)
IMM GRANULOCYTES # BLD AUTO: 0 K/UL (ref 0–0.5)
IMM GRANULOCYTES NFR BLD AUTO: 1 % (ref 0–5)
LYMPHOCYTES # BLD: 1.3 K/UL (ref 0.5–4.6)
LYMPHOCYTES NFR BLD: 20 % (ref 13–44)
MCH RBC QN AUTO: 29.5 PG (ref 26.1–32.9)
MCHC RBC AUTO-ENTMCNC: 31.6 G/DL (ref 31.4–35)
MCV RBC AUTO: 93.3 FL (ref 79.6–97.8)
MM INDURATION POC: 0 MM (ref 0–5)
MONOCYTES # BLD: 0.6 K/UL (ref 0.1–1.3)
MONOCYTES NFR BLD: 10 % (ref 4–12)
NEUTS SEG # BLD: 4.4 K/UL (ref 1.7–8.2)
NEUTS SEG NFR BLD: 67 % (ref 43–78)
NRBC # BLD: 0 K/UL (ref 0–0.2)
PLATELET # BLD AUTO: 117 K/UL (ref 150–450)
PMV BLD AUTO: 12.3 FL (ref 9.4–12.3)
POTASSIUM SERPL-SCNC: 4.1 MMOL/L (ref 3.5–5.1)
PPD POC: NEGATIVE NEGATIVE
RBC # BLD AUTO: 3.29 M/UL (ref 4.05–5.2)
SERVICE CMNT-IMP: ABNORMAL
SERVICE CMNT-IMP: NORMAL
SODIUM SERPL-SCNC: 130 MMOL/L (ref 136–145)
WBC # BLD AUTO: 6.5 K/UL (ref 4.3–11.1)

## 2022-02-26 PROCEDURE — 2709999900 HC NON-CHARGEABLE SUPPLY

## 2022-02-26 PROCEDURE — 74011636637 HC RX REV CODE- 636/637: Performed by: ORTHOPAEDIC SURGERY

## 2022-02-26 PROCEDURE — 36415 COLL VENOUS BLD VENIPUNCTURE: CPT

## 2022-02-26 PROCEDURE — 94760 N-INVAS EAR/PLS OXIMETRY 1: CPT

## 2022-02-26 PROCEDURE — 74011250637 HC RX REV CODE- 250/637: Performed by: ORTHOPAEDIC SURGERY

## 2022-02-26 PROCEDURE — 97165 OT EVAL LOW COMPLEX 30 MIN: CPT

## 2022-02-26 PROCEDURE — 77010033678 HC OXYGEN DAILY

## 2022-02-26 PROCEDURE — 74011000250 HC RX REV CODE- 250: Performed by: ORTHOPAEDIC SURGERY

## 2022-02-26 PROCEDURE — 80048 BASIC METABOLIC PNL TOTAL CA: CPT

## 2022-02-26 PROCEDURE — 97535 SELF CARE MNGMENT TRAINING: CPT

## 2022-02-26 PROCEDURE — 97530 THERAPEUTIC ACTIVITIES: CPT

## 2022-02-26 PROCEDURE — 74011250636 HC RX REV CODE- 250/636: Performed by: ORTHOPAEDIC SURGERY

## 2022-02-26 PROCEDURE — 85025 COMPLETE CBC W/AUTO DIFF WBC: CPT

## 2022-02-26 PROCEDURE — 82962 GLUCOSE BLOOD TEST: CPT

## 2022-02-26 PROCEDURE — 65270000029 HC RM PRIVATE

## 2022-02-26 PROCEDURE — 97162 PT EVAL MOD COMPLEX 30 MIN: CPT

## 2022-02-26 PROCEDURE — 74011250636 HC RX REV CODE- 250/636: Performed by: FAMILY MEDICINE

## 2022-02-26 PROCEDURE — 74011000258 HC RX REV CODE- 258: Performed by: ORTHOPAEDIC SURGERY

## 2022-02-26 PROCEDURE — 51798 US URINE CAPACITY MEASURE: CPT

## 2022-02-26 PROCEDURE — 74011250636 HC RX REV CODE- 250/636: Performed by: INTERNAL MEDICINE

## 2022-02-26 PROCEDURE — 97112 NEUROMUSCULAR REEDUCATION: CPT

## 2022-02-26 RX ADMIN — SODIUM CHLORIDE 1000 ML: 900 INJECTION, SOLUTION INTRAVENOUS at 04:30

## 2022-02-26 RX ADMIN — Medication 1 TABLET: at 08:27

## 2022-02-26 RX ADMIN — SODIUM CHLORIDE 1000 ML: 900 INJECTION, SOLUTION INTRAVENOUS at 11:01

## 2022-02-26 RX ADMIN — CETIRIZINE HYDROCHLORIDE 5 MG: 5 TABLET ORAL at 08:27

## 2022-02-26 RX ADMIN — Medication 1 TABLET: at 17:21

## 2022-02-26 RX ADMIN — GABAPENTIN 100 MG: 100 CAPSULE ORAL at 08:27

## 2022-02-26 RX ADMIN — CEFAZOLIN SODIUM 1 G: 1 INJECTION, POWDER, FOR SOLUTION INTRAMUSCULAR; INTRAVENOUS at 03:07

## 2022-02-26 RX ADMIN — Medication 4 UNITS: at 11:30

## 2022-02-26 RX ADMIN — FENOFIBRATE 160 MG: 160 TABLET ORAL at 08:27

## 2022-02-26 RX ADMIN — Medication 1 TABLET: at 12:36

## 2022-02-26 RX ADMIN — GABAPENTIN 100 MG: 100 CAPSULE ORAL at 15:19

## 2022-02-26 RX ADMIN — PRAVASTATIN SODIUM 80 MG: 80 TABLET ORAL at 21:25

## 2022-02-26 RX ADMIN — ACETAMINOPHEN 650 MG: 325 TABLET ORAL at 20:49

## 2022-02-26 RX ADMIN — SODIUM CHLORIDE, PRESERVATIVE FREE 10 ML: 5 INJECTION INTRAVENOUS at 21:26

## 2022-02-26 RX ADMIN — ACETAMINOPHEN 650 MG: 325 TABLET ORAL at 08:30

## 2022-02-26 RX ADMIN — LATANOPROST 1 DROP: 50 SOLUTION OPHTHALMIC at 21:27

## 2022-02-26 RX ADMIN — ACETAMINOPHEN 650 MG: 325 TABLET ORAL at 15:19

## 2022-02-26 RX ADMIN — SODIUM CHLORIDE, PRESERVATIVE FREE 10 ML: 5 INJECTION INTRAVENOUS at 05:59

## 2022-02-26 RX ADMIN — GABAPENTIN 100 MG: 100 CAPSULE ORAL at 21:25

## 2022-02-26 RX ADMIN — Medication 4 UNITS: at 16:30

## 2022-02-26 RX ADMIN — SODIUM CHLORIDE, PRESERVATIVE FREE 10 ML: 5 INJECTION INTRAVENOUS at 12:36

## 2022-02-26 NOTE — PROGRESS NOTES
ACUTE PHYSICAL THERAPY GOALS:  (Developed with and agreed upon by patient and/or caregiver.)  STG:  (1.)Ms. Stacy Sosa will move from supine to sit and sit to supine , scoot up and down and roll side to side with MODERATE ASSIST within 4 treatment day(s). (2.)Ms. Stacy Sosa will transfer from bed to chair and chair to bed with MODERATE ASSIST using the least restrictive device within 4 treatment day(s). (3.)Ms. Stacy Sosa will sit in bedside chair up to 6 hours within 4 treatment day(s). LTG:  (1.)Ms. Stacy Sosa will move from supine to sit and sit to supine , scoot up and down and roll side to side in bed with MINIMAL ASSIST within 7 treatment day(s). (2.)Ms. Stacy Sosa will transfer from bed to chair and chair to bed with MINIMAL ASSIST using the least restrictive device within 7 treatment day(s). (3.)Ms. Stacy Sosa will sit in bedside chair up to 8 hours within 7 treatment day(s).   ________________________________________________________________________________________________    PHYSICAL THERAPY ASSESSMENT: Initial Assessment and AM PT Treatment Day # 1      Valentine Andujar is a 81 y.o. female   PRIMARY DIAGNOSIS: Femur fracture, left ([facility]  [facility] [S72.92XA]  CHAVA (acute kidney injury) ([facility] [N17.9]  Procedure(s) (LRB):  LEFT HIP HEMIARTHROPLASTY CHOICE TO BE ADMITTED (Left)  1 Day Post-Op  Reason for Referral:    ICD-10: Treatment Diagnosis: Generalized Muscle Weakness (M62.81)  Other lack of cordination (R27.8)  Unspecified Lack of Coordination (R27.9)  Low Back Pain (M54.5)  [place]PAT[place]: [place] / [place] / Product Type: Medicare /     ASSESSMENT:     REHAB RECOMMENDATIONS:   Recommendation to date pending progress:  Setting:   Short-term Rehab  Equipment:    None     PRIOR LEVEL OF FUNCTION:  (Prior to Hospitalization) INITIAL/CURRENT LEVEL OF FUNCTION:  (Most Recently Demonstrated)   Bed Mobility:   Minimal Assistance  Sit to Stand:   Minimal Assistance  Transfers:   Minimal Assistance  Gait/Mobility:   Unable to perform Bed Mobility:   Maximal Assistance  Sit to Stand:   Maximal Assistance  Transfers:   Maximal Assistance  Gait/Mobility:   Not tested     ASSESSMENT:  Ms. Mireya Rice presents with decreased transfers and mobility with above diagnosis. She is nonambulatory and rests in a wheelchair during the day. She demonstrates transfers of MAX A x 2 out of bed to sit with slumped and poor to fair static sitting balance and then MAX A x 2 to stand and pivot to the chair. Stand with MAX A x 2 then for placement of the chair alert. She is comfortably positioned and reclined. Oriented to phone and situation with safety and lunch. No complaints of significant pain. Skilled PT is indicated for patient safety and mobility progression during current acute care episode. SUBJECTIVE:   Ms. Mireya Rice states, \"I normally sit in a wheelchair. \"    SOCIAL HISTORY/LIVING ENVIRONMENT:   Home Environment: Skilled nursing facility  # Steps to Enter: 0  One/Two Story Residence: One story  Living Alone: No  Support Systems: Child(abdiaziz),Caregiver/Home Care Staff  OBJECTIVE:     PAIN: VITAL SIGNS: LINES/DRAINS:   Pre Treatment: Pain Screen  Pain Scale 1: Numeric (0 - 10)  Pain Intensity 1: 2  Pain Location 1: Leg  Pain Orientation 1: Left  Pain Intervention(s) 1: Repositioned  Post Treatment: 2/10    IV  O2 Device: None (Room air)     GROSS EVALUATION:   Within Functional Limits Abnormal/ Functional Abnormal/ Non-Functional (see comments) Not Tested Comments:   AROM [] [x] [] []    PROM [] [x] [] []    Strength [] [x] [] []    Balance [] [] [x] []    Posture [] [x] [] []    Sensation [] [x] [] []    Coordination [] [] [x] []    Tone [] [x] [] []    Edema [] [x] [] []    Activity Tolerance [] [x] [] []     [] [] [] []      COGNITION/  PERCEPTION: Intact Impaired   (see comments) Comments:   Orientation [] [x] Dementia with partial orientation    Vision [x] [] Hearing [x] []    Command Following [] [x]    Safety Awareness [] [x]     [] []      MOBILITY: I Mod I S SBA CGA Min Mod Max Total  NT x2 Comments:   Bed Mobility    Rolling [] [] [] [] [] [] [] [x] [] [] [x]    Supine to Sit [] [] [] [] [] [] [] [x] [] [] [x]    Scooting [] [] [] [] [] [] [] [x] [] [] [x]    Sit to Supine [] [] [] [] [] [] [] [x] [] [] [x]    Transfers    Sit to Stand [] [] [] [] [] [] [] [x] [] [] [x]    Bed to Chair [] [] [] [] [] [] [] [x] [] [] [x]    Stand to Sit [] [] [] [] [] [] [] [x] [] [] [x]    I=Independent, Mod I=Modified Independent, S=Supervision, SBA=Standby Assistance, CGA=Contact Guard Assistance,   Min=Minimal Assistance, Mod=Moderate Assistance, Max=Maximal Assistance, Total=Total Assistance, NT=Not Tested  GAIT: I Mod I S SBA CGA Min Mod Max Total  NT x2 Comments:   Level of Assistance [] [] [] [] [] [] [] [] [] [x] [] Nonfunctional ambulator bed to chair or bedside transfers only. Distance NT     DME N/A    Gait Quality N/A     Weightbearing Status N/A     I=Independent, Mod I=Modified Independent, S=Supervision, SBA=Standby Assistance, CGA=Contact Guard Assistance,   Min=Minimal Assistance, Mod=Moderate Assistance, Max=Maximal Assistance, Total=Total Assistance, NT=Not Tested    [address] [facility] [address] [address] [facility] Form       How much difficulty does the patient currently have. .. Unable A Lot A Little None   1. Turning over in bed (including adjusting bedclothes, sheets and blankets)? [] 1   [x] 2   [] 3   [] 4   2. Sitting down on and standing up from a chair with arms ( e.g., wheelchair, bedside commode, etc.)   [] 1   [x] 2   [] 3   [] 4   3. Moving from lying on back to sitting on the side of the bed? [] 1   [x] 2   [] 3   [] 4   How much help from another person does the patient currently need. .. Total A Lot A Little None   4. Moving to and from a bed to a chair (including a wheelchair)? [] 1   [x] 2   [] 3   [] 4   5. Need to walk in hospital room? [x] 1   [] 2   [] 3   [] 4   6. Climbing 3-5 steps with a railing? [x] 1   [] 2   [] 3   [] 4   © 2007, Trustees of [facility] [employer], under license to [employer]. All rights reserved     Score:  Initial: 10 Most Recent: X (Date: -- )    Interpretation of Tool:  Represents activities that are increasingly more difficult (i.e. Bed mobility, Transfers, Gait). PLAN:   FREQUENCY/DURATION: PT Plan of Care: BID for duration of hospital stay or until stated goals are met, whichever comes first.    PROBLEM LIST:   (Skilled intervention is medically necessary to address:)  1. Decreased Activity Tolerance  2. Decreased AROM/PROM  3. Decreased Balance  4. Decreased Cognition  5. Decreased Coordination  6. Decreased Strength  7. Decreased Transfer Abilities   INTERVENTIONS PLANNED:   (Benefits and precautions of physical therapy have been discussed with the patient.)  1. Therapeutic Activity  2. Therapeutic Exercise/HEP  3. Neuromuscular Re-education  4. Manual Therapy  5. Education     TREATMENT:     EVALUATION: Moderate Complexity : (Untimed Charge)    TREATMENT:   ($$ Therapeutic Activity: 23-37 mins    )  Co-Treatment PT/OT necessary due to patient's decreased overall endurance/tolerance levels, as well as need for high level skilled assistance to complete functional transfers/mobility and functional tasks  Therapeutic Activity (23 Minutes): Therapeutic activity included Rolling, Supine to Sit, Sit to Supine, Scooting, Lateral Scooting, Transfer Training, Sitting balance  and Standing balance to improve functional Mobility, Strength, ROM and Activity tolerance.     TREATMENT GRID:   Date:   Date:   Date:     Activity/Exercise Parameters Parameters Parameters                                                   AFTER TREATMENT POSITION/PRECAUTIONS:  Chair, Needs within reach and RN notified    INTERDISCIPLINARY COLLABORATION:  RN/PCT, PT/PTA and OT/GUTIERRES    TOTAL TREATMENT DURATION:  PT Patient Time In/Time Out  Time In: 1115  Time Out: [address] [place], [place]

## 2022-02-26 NOTE — PROGRESS NOTES
Progress Note    Patient: Ellyn Martel MRN: [id number]  SSN: [id number]    YOB: 1935  Age: 81 y.o. Sex: female      Admit Date: 2/23/2022    LOS: 3 days     Subjective:     Patient reports that she is having some pain in her left hip but she says the pain medicine has helped overnight and it does not seem to be bothering her too much. I have asked her very specifically this morning about whether not she is having any pain in her right hip. She says she does not have any real pain in her right hip. She has never fallen in the past that she can remember and injured her right hip. An she does not think it Hurts very much to move this around    Objective:     Vitals:    02/26/22 0012 02/26/22 0400 02/26/22 0406 02/26/22 0600   BP: (!) 97/55 (!) 99/41 (!) 102/41 (!) 110/41   Pulse: 69 65     Resp: 16 16     Temp: 98 °F (36.7 °C) 98.1 °F (36.7 °C)     SpO2: 99% 100%     Weight:       Height:            Intake and Output:  Current Shift: 02/25 1901 - 02/26 0700  In: -   Out: 350 [Urine:350]  Last three shifts: 02/24 0701 - 02/25 1900  In: 750 [I.V.:750]  Out: 2750 [Urine:2700]    alert and oriented to person place time and situation  Skin - dressing clean dry and intact  Motor and sensory function intact in LEFT LOWER extremity  Pulses palpable in LEFT LOWER extremity       Lab/Data Review:  CBC:   Lab Results   Component Value Date/Time    WBC 6.5 02/26/2022 04:27 AM    HGB 9.7 (L) 02/26/2022 04:27 AM    HCT 30.7 (L) 02/26/2022 04:27 AM     (L) 02/26/2022 04:27 AM          Assessment:     Acute postop blood loss anemia with hemoglobin stable at 9.7, POD #1 from left hip hemiarthroplasty for left femoral neck fracture    Plan:     I wanted to see the patient this morning and talk to her about her right hip. Her postoperative films and even her injury films are little concerning to my reading for a valgus impacted right femoral neck fracture.   However clinically she has no history of falling she has no pain with range of motion of her right lower extremity so I think this is either just her normal appearance on x-ray or it could be something that is an old fracture but clinically she does not seem to have any concern for an injury to the right hip. The left hip postoperative films show a well-positioned left hip hemiarthroplasty. Patient may be weightbearing as tolerated on the left lower extremity. They can be full active and passive range of motion of the left hip. They can have dry dressing change starting on postoperative day 2 and then after that daily and as needed.   She can restart her Eliquis on postoperative day #3  Signed By: Andry Mtz MD     February 26, 2022

## 2022-02-26 NOTE — PROGRESS NOTES
ACUTE PHYSICAL THERAPY GOALS:  (Developed with and agreed upon by patient and/or caregiver.)  STG:  (1.)Ms. Saintclair Garret will move from supine to sit and sit to supine , scoot up and down and roll side to side with MODERATE ASSIST within 4 treatment day(s). (2.)Ms. Saintclair Garret will transfer from bed to chair and chair to bed with MODERATE ASSIST using the least restrictive device within 4 treatment day(s). (3.)Ms. Saintclair Garret will sit in bedside chair up to 6 hours within 4 treatment day(s). LTG:  (1.)Ms. Saintclair Garret will move from supine to sit and sit to supine , scoot up and down and roll side to side in bed with MINIMAL ASSIST within 7 treatment day(s). (2.)Ms. Saintclair Garret will transfer from bed to chair and chair to bed with MINIMAL ASSIST using the least restrictive device within 7 treatment day(s). (3.)Ms. Saintclair Garret will sit in bedside chair up to 8 hours within 7 treatment day(s).   ________________________________________________________________________________________________    PHYSICAL THERAPY ASSESSMENT: Initial Assessment and PM PT Treatment Day # 1      Adolfo Leonard is a 81 y.o. female   PRIMARY DIAGNOSIS: Femur fracture, left ([facility]  Femur fracture, left ([facility] [address] [S72.92XA]  CHAVA (acute kidney injury) ([facility] [address] [N17.9]  Procedure(s) (LRB):  LEFT HIP HEMIARTHROPLASTY CHOICE TO BE ADMITTED (Left)  1 Day Post-Op  Reason for Referral:    ICD-10: Treatment Diagnosis: Generalized Muscle Weakness (M62.81)  Other lack of cordination (R27.8)  Unspecified Lack of Coordination (R27.9)  Low Back Pain (M54.5)  [place]PAT[place] / [place] / Product Type: Medicare /     ASSESSMENT:     REHAB RECOMMENDATIONS:   Recommendation to date pending progress:  Setting:   Short-term Rehab  Equipment:    None     PRIOR LEVEL OF FUNCTION:  (Prior to Hospitalization) INITIAL/CURRENT LEVEL OF FUNCTION:  (Most Recently Demonstrated)   Bed Mobility:   Minimal Assistance  Sit to Stand:   Minimal Assistance  Transfers:   Minimal Assistance  Gait/Mobility:   Unable to perform Bed Mobility:   Maximal Assistance  Sit to Stand:   Maximal Assistance  Transfers:   Maximal Assistance  Gait/Mobility:   Not tested     ASSESSMENT:  Ms. Ajith Pettit presents with decreased transfers and mobility with above diagnosis. She is nonambulatory and rests in a wheelchair during the day. She demonstrates transfers of MAX A x 2 out of bed to sit with slumped and poor to fair static sitting balance and then MAX A x 2 to stand and pivot to the chair. Stand with MAX A x 2 then for placement of the chair alert. She is comfortably positioned and reclined. Oriented to phone and situation with safety and lunch. No complaints of significant pain. Skilled PT is indicated for patient safety and mobility progression during current acute care episode. PM UPDATE :  Seated pleasantly with daughter present. Has been in nursing home since December 2021 when unable to live by herself and transition from SNF. All transfers return to bed from chair MAX A x 1 with poor standing balance and return to supine with MAX A x 1 and rolling to supine. Then positioned upright in supine with forward inclination. SP02 at 95% on 2 liters 02 nasal cannula and request for pain medication post treatment relayed. No number given for pain. SUBJECTIVE:   Ms. Ajith Pettit states, \"I am ready to lay back down. .\"    SOCIAL HISTORY/LIVING ENVIRONMENT:   Home Environment: Skilled nursing facility  # Steps to Enter: 0  One/Two Story Residence: One story  Living Alone: No  Support Systems: Child(abdiaziz),Caregiver/Home Care Staff  OBJECTIVE:     PAIN: VITAL SIGNS: LINES/DRAINS:   Pre Treatment: Pain Screen  Pain Scale 1: Numeric (0 - 10)  Pain Intensity 1: 0  Pain Location 1: Leg  Pain Orientation 1: Left  Pain Intervention(s) 1: Repositioned  Post Treatment: 2/10    IV  O2 Device: Nasal cannula     GROSS EVALUATION:   Within Functional Limits Abnormal/ Functional Abnormal/ Non-Functional (see comments) Not Tested Comments:   AROM [] [x] [] []    PROM [] [x] [] []    Strength [] [x] [] []    Balance [] [] [x] []    Posture [] [x] [] []    Sensation [] [x] [] []    Coordination [] [] [x] []    Tone [] [x] [] []    Edema [] [x] [] []    Activity Tolerance [] [x] [] []     [] [] [] []      COGNITION/  PERCEPTION: Intact Impaired   (see comments) Comments:   Orientation [] [x] Dementia with partial orientation    Vision [x] []    Hearing [x] []    Command Following [] [x]    Safety Awareness [] [x]     [] []      MOBILITY: I Mod I S SBA CGA Min Mod Max Total  NT x2 Comments:   Bed Mobility    Rolling [] [] [] [] [] [] [] [x] [] [] [x]    Supine to Sit [] [] [] [] [] [] [] [x] [] [] [x]    Scooting [] [] [] [] [] [] [] [x] [] [] [x]    Sit to Supine [] [] [] [] [] [] [] [x] [] [] [x]    Transfers    Sit to Stand [] [] [] [] [] [] [] [x] [] [] [x]    Bed to Chair [] [] [] [] [] [] [] [x] [] [] [x]    Stand to Sit [] [] [] [] [] [] [] [x] [] [] [x]    I=Independent, Mod I=Modified Independent, S=Supervision, SBA=Standby Assistance, CGA=Contact Guard Assistance,   Min=Minimal Assistance, Mod=Moderate Assistance, Max=Maximal Assistance, Total=Total Assistance, NT=Not Tested  GAIT: I Mod I S SBA CGA Min Mod Max Total  NT x2 Comments:   Level of Assistance [] [] [] [] [] [] [] [] [] [x] [] Nonfunctional ambulator bed to chair and return or bedside transfers only. Distance NT     DME N/A    Gait Quality N/A     Weightbearing Status N/A     I=Independent, Mod I=Modified Independent, S=Supervision, SBA=Standby Assistance, CGA=Contact Guard Assistance,   Min=Minimal Assistance, Mod=Moderate Assistance, Max=Maximal Assistance, Total=Total Assistance, NT=Not Tested    [address] [facility] [address] [id number] AM-PAC [facility] Form       How much difficulty does the patient currently have. .. Unable A Lot A Little None   1.   Turning over in bed (including adjusting bedclothes, sheets and blankets)? [] 1   [x] 2   [] 3   [] 4   2. Sitting down on and standing up from a chair with arms ( e.g., wheelchair, bedside commode, etc.)   [] 1   [x] 2   [] 3   [] 4   3. Moving from lying on back to sitting on the side of the bed? [] 1   [x] 2   [] 3   [] 4   How much help from another person does the patient currently need. .. Total A Lot A Little None   4. Moving to and from a bed to a chair (including a wheelchair)? [] 1   [x] 2   [] 3   [] 4   5. Need to walk in hospital room? [x] 1   [] 2   [] 3   [] 4   6. Climbing 3-5 steps with a railing? [x] 1   [] 2   [] 3   [] 4   © 2007, Trustees of [address] [place] [address], under license to [employer]. All rights reserved     Score:  Initial: 10 Most Recent: X (Date: -- )    Interpretation of Tool:  Represents activities that are increasingly more difficult (i.e. Bed mobility, Transfers, Gait). PLAN:   FREQUENCY/DURATION: PT Plan of Care: BID for duration of hospital stay or until stated goals are met, whichever comes first.    PROBLEM LIST:   (Skilled intervention is medically necessary to address:)  1. Decreased Activity Tolerance  2. Decreased AROM/PROM  3. Decreased Balance  4. Decreased Cognition  5. Decreased Coordination  6. Decreased Strength  7. Decreased Transfer Abilities   INTERVENTIONS PLANNED:   (Benefits and precautions of physical therapy have been discussed with the patient.)  1. Therapeutic Activity  2. Therapeutic Exercise/HEP  3. Neuromuscular Re-education  4. Manual Therapy  5. Education     TREATMENT:     EVALUATION: Moderate Complexity : (Untimed Charge) AM   TREATMENT:   ($$ Therapeutic Activity: 23-37 mins    )  Therapeutic Activity (23 Minutes): Therapeutic activity included Rolling, Supine to Sit, Sit to Supine, Scooting, Lateral Scooting, Transfer Training, Sitting balance  and Standing balance to improve functional Mobility, Strength, ROM and Activity tolerance.     TREATMENT GRID:   Date: Date:   Date:     Activity/Exercise Parameters Parameters Parameters                                                   AFTER TREATMENT POSITION/PRECAUTIONS:  Bed, Needs within reach and RN notified    INTERDISCIPLINARY COLLABORATION:  RN/PCT, PT/PTA and OT/GUTIERRES    TOTAL TREATMENT DURATION:  PT Patient Time In/Time Out  Time In: 1430  Time Out: [address] [place], [place]

## 2022-02-26 NOTE — PROGRESS NOTES
Physician Progress Note      Jonah Bautista  CSN #:                  [id number]  :                       1935  ADMIT DATE:       2022 11:08 AM  DISCH DATE:  RESPONDING  PROVIDER #:        GRISELDA DUNN DO          QUERY TEXT:    Patient admitted with a left femur/hip fracture and  noted to have a positive COVID test on admission--. If possible, please document in progress notes and discharge summary if you are evaluating and/or treating any of the following: The medical record reflects the following:  Risk Factors: resides in a nursing facility, obesity, pandemic  Clinical Indicators: cxray--scarring, no acute process,  cr-1.9 --jessica, vss, wbc wnl,  Treatment: 2lnc 02, ortho consult for hip fx--for surgery, fluids, labs, essential home meds. Options provided:  -- COVID 19  -- Positive test only and not clinically significant  -- Other - I will add my own diagnosis  -- Disagree - Not applicable / Not valid  -- Disagree - Clinically unable to determine / Unknown  -- Refer to Clinical Documentation Reviewer    PROVIDER RESPONSE TEXT:    Positive test only and is not clinically significant.     Query created by: Canelo Long on 2022 11:06 AM      Electronically signed by:  Chun Goode DO 2022 7:04 AM

## 2022-02-26 NOTE — PROGRESS NOTES
Hospitalist Progress Note   Admit Date:  2022 11:08 AM   Name:  Vern Powers   Age:  81 y.o. Sex:  female  :  1935   MRN:  [id number]   Room:  726/01    Presenting Complaint: Fall    Reason(s) for Admission: Femur fracture, left ([address] [S72.92XA]  CHAVA (acute kidney injury) [facility]) [N17.9]     Hospital Course & Interval History:   Patient is a 81 y.o. female who presented to the ED for cc left hip pain after a fall when tripping over her wheel chair. Denies syncope or dizziness. Hx of DM type II, HTN, HLD, depression, CKD stage 3, paroxysmal a fib on Eliquis (unsure if her last dose was last night or this AM), and left heel wound. Currently at rehab center for generalized weakness.      Vitals- stable     Labs- Hg 9.5 (was 11 on 21)--denies obvious bleeding, platelets 492, creatine 1.9 from baseline 1.4     Hip x ray with left femoral neck fracture    Subjective/24hr Events (22): Patient seen and examined immediately postoperatively and patient continues to be somewhat drowsy and a poor historian at this time. Unable to participate my examination but does manage to deny any pain. ROS:  10 systems reviewed and negative except as noted above. Assessment & Plan:   Left femoral neck fracture - Consult ortho. Will allow to eat since typically we hold Eliquis 48 hours before surgery. At moderate risk for surgical and post surgical complications. Will repeat ECHO since RV pressure noted to be elevated in . No hx of CHF.   -orthopedic surgery consulted and holding Eliquis. Activity level weightbearing status surgical intervention and anticoagulation are per orthopedic surgery  -surgical intervention today. Weightbearing status anticoagulation and pain management per orthopedic surgery  -Per orthopedic surgeon    Hyperkalemia-due to pending surgical intervention today we will start patient on one-time dose of IV insulin follow-up with D5W to avoid hypoglycemia. Additionally will give the patient a dose of Lokelma. Will monitor closely. Subsequent BMP obtained at 10 AM demonstrated resolution of hypokalemia with initiation of treatment  2/25-resolved      Hypotension-2/26 patient not on any antihypertensive medications and may be secondary to lasting effects of anesthesia. We will bolus the patient 1 L of normal saline at this time and monitor. Bradycardia-2/26-we will hold patient's propanolol and monitor. will start patient on telemetry and monitor. Likely secondary to effects of anesthesia       CHAVA - Pre renal. Dry on exam. Gentle fluids. Hold Zoloft. 2/24-improving however not to baseline. Continue IV fluids and monitor BMP closely  2/25-resolved with baseline. Given patient n.p.o. for most of the day we will continue IV fluids and consider discontinuation in a.m.  2/26-resolved    Dm type II - SS.  A1C 6.9 in Dec which is very well controlled in an 81year old.   2/26-hemoglobin A1c noted to be 7.4 which given the patient's advanced age of 81 would be at goal.            HLD - fenofibrate/statin     Local wound care to left heel      DNR     DVT prophylaxis - SCDs    Diet: N.p.o.    Code status: DNR    Hospital Problems as of 2/26/2022 Never Reviewed          Codes Class Noted - Resolved POA    * (Principal) [facility] ICD-10-CM: X39.13DN  ICD-9-CM: 821.00  2/23/2022 - Present Unknown        CHAVA (acute kidney injury) ([facility] ICD-10-CM: N17.9  ICD-9-CM: 584.9  2/23/2022 - Present Unknown        Thyroid enlargement ICD-10-CM: E04.9  ICD-9-CM: 240.9  12/13/2021 - Present Yes        Essential hypertension with goal blood pressure less than 140/90 ICD-10-CM: I10  ICD-9-CM: 401.9  8/25/2016 - Present Yes        Diabetes ([facility] ICD-10-CM: E11.9  ICD-9-CM: 250.00  11/13/2014 - Present Yes              Objective:     Patient Vitals for the past 24 hrs:   Temp Pulse Resp BP SpO2   02/26/22 1047 98.4 °F (36.9 °C) 72  (!) 93/41 92 %   02/26/22 0732 97.8 °F (36.6 °C) 72 17 (!) 106/50 99 %   02/26/22 0600    (!) 110/41    02/26/22 0406    (!) 102/41    02/26/22 0400 98.1 °F (36.7 °C) 65 16 (!) 99/41 100 %   02/26/22 0012 98 °F (36.7 °C) 69 16 (!) 97/55 99 %   02/25/22 1905 99.7 °F (37.6 °C) 66 16 110/75 100 %   02/25/22 1436 98.2 °F (36.8 °C) (!) 55 18 104/77 94 %   02/25/22 1324  (!) 58 16 (!) 142/63 96 %   02/25/22 1319 97.8 °F (36.6 °C) (!) 57 18 (!) 142/63 97 %     Oxygen Therapy  O2 Sat (%): 92 % (02/26/22 1047)  Pulse via Oximetry: 58 beats per minute (02/25/22 1324)  O2 Device: None (Room air) (02/25/22 1436)  Skin Assessment: Clean, dry, & intact (02/24/22 0720)  Skin Protection for O2 Device: No (02/24/22 0720)  O2 Flow Rate (L/min): 2 l/min (02/25/22 1319)    Estimated body mass index is 27.59 kg/m² as calculated from the following:    Height as of this encounter: 5' 1\" (1.549 m). Weight as of this encounter: 66.2 kg (146 lb). Intake/Output Summary (Last 24 hours) at 2/26/2022 1317  Last data filed at 2/26/2022 1255  Gross per 24 hour   Intake 200 ml   Output 350 ml   Net -150 ml         Physical Exam:     Blood pressure (!) 93/41, pulse 72, temperature 98.4 °F (36.9 °C), resp. rate 17, height 5' 1\" (1.549 m), weight 66.2 kg (146 lb), SpO2 92 %, not currently breastfeeding. General:    Well nourished. No overt distress  Head:  Normocephalic, atraumatic  Eyes:  Sclerae appear normal.  Pupils equally round. ENT:  Nares appear normal, no drainage. Moist oral mucosa  Neck:  No restricted ROM. Trachea midline   CV:   RRR. No m/r/g. No jugular venous distension. Lungs:   CTAB. No wheezing, rhonchi, or rales. Respirations even, unlabored  Abdomen: Bowel sounds present. Soft, nontender, nondistended. Extremities: No cyanosis or clubbing. No edema  Skin:     No rashes and normal coloration. Warm and dry. Neuro:  CN II-XII grossly intact. Sensation intact. A&Ox3  Psych:  Normal mood and affect.       I have reviewed ordered lab tests and independently visualized imaging below:    Recent Labs:  Recent Results (from the past 48 hour(s))   GLUCOSE, POC    Collection Time: 02/24/22  4:15 PM   Result Value Ref Range    Glucose (POC) 205 (H) 65 - 100 mg/dL    Performed by Alvin    PLEASE READ & DOCUMENT PPD TEST IN 24 HRS    Collection Time: 02/24/22  5:42 PM   Result Value Ref Range    PPD Negative Negative    mm Induration 0 0 - 5 mm   GLUCOSE, POC    Collection Time: 02/24/22  8:15 PM   Result Value Ref Range    Glucose (POC) 208 (H) 65 - 100 mg/dL    Performed by Maria R    CBC W/O DIFF    Collection Time: 02/25/22  5:03 AM   Result Value Ref Range    WBC 6.3 4.3 - 11.1 K/uL    RBC 3.77 (L) 4.05 - 5.2 M/uL    HGB 10.9 (L) 11.7 - 15.4 g/dL    HCT 34.9 (L) 35.8 - 46.3 %    MCV 92.6 79.6 - 97.8 FL    MCH 28.9 26.1 - 32.9 PG    MCHC 31.2 (L) 31.4 - 35.0 g/dL    RDW 14.4 11.9 - 14.6 %    PLATELET 698 (L) 467 - 450 K/uL    MPV 12.1 9.4 - 12.3 FL    ABSOLUTE NRBC 0.00 0.0 - 0.2 K/uL   METABOLIC PANEL, BASIC    Collection Time: 02/25/22  5:03 AM   Result Value Ref Range    Sodium 133 (L) 136 - 145 mmol/L    Potassium 4.2 3.5 - 5.1 mmol/L    Chloride 101 98 - 107 mmol/L    CO2 25 21 - 32 mmol/L    Anion gap 7 7 - 16 mmol/L    Glucose 210 (H) 65 - 100 mg/dL    BUN 24 (H) 8 - 23 MG/DL    Creatinine 1.30 (H) 0.6 - 1.0 MG/DL    GFR est AA 50 (L) >60 ml/min/1.73m2    GFR est non-AA 41 (L) >60 ml/min/1.73m2    Calcium 8.1 (L) 8.3 - 10.4 MG/DL   HEMOGLOBIN A1C WITH EAG    Collection Time: 02/25/22  5:03 AM   Result Value Ref Range    Hemoglobin A1c 7.4 (H) 4.20 - 6.30 %    Est. average glucose 166 mg/dL   GLUCOSE, POC    Collection Time: 02/25/22  6:02 AM   Result Value Ref Range    Glucose (POC) 246 (H) 65 - 100 mg/dL    Performed by Maria R    GLUCOSE, POC    Collection Time: 02/25/22  9:05 AM   Result Value Ref Range    Glucose (POC) 354 (H) 65 - 100 mg/dL    Performed by Honey Peterson    GLUCOSE, POC    Collection Time: 02/25/22 10:33 AM   Result Value Ref Range    Glucose (POC) 173 (H) 65 - 100 mg/dL    Performed by Leeann    GLUCOSE, POC    Collection Time: 02/25/22 11:25 AM   Result Value Ref Range    Glucose (POC) 135 (H) 65 - 100 mg/dL    Performed by Carlito MEDINA 1711, POC    Collection Time: 02/25/22 12:29 PM   Result Value Ref Range    Glucose (POC) 54 (L) 65 - 100 mg/dL    Performed by Carlito MEDINA 1711, POC    Collection Time: 02/25/22 12:45 PM   Result Value Ref Range    Glucose (POC) 225 (H) 65 - 100 mg/dL    Performed by Praful Shearer    GLUCOSE, POC    Collection Time: 02/25/22  4:30 PM   Result Value Ref Range    Glucose (POC) 111 (H) 65 - 100 mg/dL    Performed by Keyla    GLUCOSE, POC    Collection Time: 02/25/22  9:09 PM   Result Value Ref Range    Glucose (POC) 154 (H) 65 - 100 mg/dL    Performed by Olimpia    CBC WITH AUTOMATED DIFF    Collection Time: 02/26/22  4:27 AM   Result Value Ref Range    WBC 6.5 4.3 - 11.1 K/uL    RBC 3.29 (L) 4.05 - 5.2 M/uL    HGB 9.7 (L) 11.7 - 15.4 g/dL    HCT 30.7 (L) 35.8 - 46.3 %    MCV 93.3 79.6 - 97.8 FL    MCH 29.5 26.1 - 32.9 PG    MCHC 31.6 31.4 - 35.0 g/dL    RDW 14.4 11.9 - 14.6 %    PLATELET 424 (L) 207 - 450 K/uL    MPV 12.3 9.4 - 12.3 FL    ABSOLUTE NRBC 0.00 0.0 - 0.2 K/uL    DF AUTOMATED      NEUTROPHILS 67 43 - 78 %    LYMPHOCYTES 20 13 - 44 %    MONOCYTES 10 4.0 - 12.0 %    EOSINOPHILS 2 0.5 - 7.8 %    BASOPHILS 1 0.0 - 2.0 %    IMMATURE GRANULOCYTES 1 0.0 - 5.0 %    ABS. NEUTROPHILS 4.4 1.7 - 8.2 K/UL    ABS. LYMPHOCYTES 1.3 0.5 - 4.6 K/UL    ABS. MONOCYTES 0.6 0.1 - 1.3 K/UL    ABS. EOSINOPHILS 0.1 0.0 - 0.8 K/UL    ABS. BASOPHILS 0.0 0.0 - 0.2 K/UL    ABS. IMM.  GRANS. 0.0 0.0 - 0.5 K/UL   METABOLIC PANEL, BASIC    Collection Time: 02/26/22  4:27 AM   Result Value Ref Range    Sodium 130 (L) 136 - 145 mmol/L    Potassium 4.1 3.5 - 5.1 mmol/L    Chloride 98 98 - 107 mmol/L    CO2 26 21 - 32 mmol/L    Anion gap 6 (L) 7 - 16 mmol/L    Glucose 108 (H) 65 - 100 mg/dL    BUN 23 8 - 23 MG/DL    Creatinine 1.30 (H) 0.6 - 1.0 MG/DL    GFR est AA 50 (L) >60 ml/min/1.73m2    GFR est non-AA 41 (L) >60 ml/min/1.73m2    Calcium 7.5 (L) 8.3 - 10.4 MG/DL   GLUCOSE, POC    Collection Time: 02/26/22  6:18 AM   Result Value Ref Range    Glucose (POC) 145 (H) 65 - 100 mg/dL    Performed by [employer] GroupJULIANNE    GLUCOSE, POC    Collection Time: 02/26/22 11:20 AM   Result Value Ref Range    Glucose (POC) 203 (H) 65 - 100 mg/dL    Performed by Jude        All Micro Results     Procedure Component Value Units Date/Time    COVID-19 RAPID TEST [[id number]]  (Abnormal) Collected: 02/23/22 1852    Order Status: Completed Specimen: Nasopharyngeal Updated: 02/23/22 1934     Specimen source NASAL        COVID-19 rapid test Detected        Comment:      The specimen is POSITIVE for SARS-CoV-2, the novel coronavirus associated with COVID-19. This test has been authorized by the FDA under an Emergency Use Authorization (EUA) for use by authorized laboratories. Fact sheet for Healthcare Providers: ConventionUpdate.co.nz  Fact sheet for Patients: ConventionUpdate.co.nz       Methodology: Isothermal Nucleic Acid Amplification  RESULTS VERIFIED, PHONED TO AND READ BACK BY  SANDRA MUNOZ AT 1934 2.23.22 EOR               Other Studies:  XR PELV AP ONLY    Result Date: 2/25/2022  Pelvis INDICATION: Post hip replacement A supine view of the pelvis was obtained. The patient is post left hip replacement. Implant is well-positioned. There is no fracture or dislocation. Irregular angulation of the subcapital aspect right femoral neck. Correlate with any right hip pain to suggest right hip fracture. Calcified uterine fibroid projects over the pelvis. 1. Post left hip replacement 2. Irregular angulation right femoral neck in the subcapital region of the proximal right femur.  Correlate clinically for any evidence of right hip fracture. XR HIP LT W OR WO PELV 2-3 VWS    Result Date: 2/25/2022  Left Hip INDICATION: Postop left hip replacement. 3 views of the left hip demonstrate interval total left hip arthroplasty. Bones are in anatomic alignment. No hardware complication. Irregularity subcapital portion right femoral neck of uncertain significance. Correlate clinically for any right hip pain to suggest subacute or chronic right hip fracture. Calcified uterine fibroid noted in the central pelvis.        Current Meds:  Current Facility-Administered Medications   Medication Dose Route Frequency    morphine injection 2 mg  2 mg IntraVENous Q1H PRN    oxyCODONE IR (ROXICODONE) tablet 5 mg  5 mg Oral Q4H PRN    sodium chloride (NS) flush 5-40 mL  5-40 mL IntraVENous Q8H    sodium chloride (NS) flush 5-40 mL  5-40 mL IntraVENous PRN    alum-mag hydroxide-simeth (MYLANTA) oral suspension 30 mL  30 mL Oral Q4H PRN    calcium-vitamin D (OS-CHELSEY +D3) 500 mg-200 unit per tablet 1 Tablet  1 Tablet Oral TID WITH MEALS    dextrose 40% (GLUTOSE) oral gel 1 Tube  15 g Oral PRN    glucagon (GLUCAGEN) injection 1 mg  1 mg IntraMUSCular PRN    dextrose 10% infusion 125-250 mL  125-250 mL IntraVENous PRN    fenofibrate (LOFIBRA) tablet 160 mg  160 mg Oral DAILY    gabapentin (NEURONTIN) capsule 100 mg  100 mg Oral TID    latanoprost (XALATAN) 0.005 % ophthalmic solution 1 Drop  1 Drop Both Eyes QHS    cetirizine (ZYRTEC) tablet 5 mg  5 mg Oral DAILY    pravastatin (PRAVACHOL) tablet 80 mg  80 mg Oral QHS    propranoloL (INDERAL) tablet 20 mg  20 mg Oral BID    sodium chloride (NS) flush 5-40 mL  5-40 mL IntraVENous Q8H    sodium chloride (NS) flush 5-40 mL  5-40 mL IntraVENous PRN    acetaminophen (TYLENOL) tablet 650 mg  650 mg Oral Q6H PRN    Or    acetaminophen (TYLENOL) suppository 650 mg  650 mg Rectal Q6H PRN    polyethylene glycol (MIRALAX) packet 17 g  17 g Oral DAILY PRN    ondansetron (ZOFRAN ODT) tablet 4 mg  4 mg Oral Q8H PRN    Or    ondansetron (ZOFRAN) injection 4 mg  4 mg IntraVENous Q6H PRN    insulin lispro (HUMALOG) injection   SubCUTAneous AC&HS       Signed:  Ezio Allan DO    Part of this note may have been written by using a voice dictation software. The note has been proof read but may still contain some grammatical/other typographical errors.

## 2022-02-26 NOTE — PROGRESS NOTES
ACUTE OT GOALS:  (Developed with and agreed upon by patient and/or caregiver.)  Complete functional transfers (I.e. toilet) with moderate assistance or less. Complete upper body bathing and dressing with standby assistance. Complete B UE strengthening 15-20 minutes in preparation for therapeutic activities/ADL. Complete grooming/oral care with standby assistance. Time frame: 4 - 7 visits. OCCUPATIONAL THERAPY ASSESSMENT: Initial Assessment and Daily Note OT Treatment Day # 1    Jitendra Chan is a 81 y.o. female   PRIMARY DIAGNOSIS: Femur fracture, left (HCC)  Femur fracture, left (HCC) [S72.92XA]  CHAVA (acute kidney injury) ([facility] [address] [N17.9]  Procedure(s) (LRB):  LEFT HIP HEMIARTHROPLASTY CHOICE TO BE ADMITTED (Left)  1 Day Post-Op  Reason for Referral:    ICD-10: Treatment Diagnosis: Generalized Muscle Weakness (M62.81)  History of falling (Z91.81)  [place]PAT[place] / [place] PART [place] B / Product Type: Medicare /   ASSESSMENT:     REHAB RECOMMENDATIONS:   Recommendation to date pending progress:  Setting:  Short-term Rehab  Equipment:   To Be Determined     PRIOR LEVEL OF FUNCTION:  (Prior to Hospitalization)  INITIAL/CURRENT LEVEL OF FUNCTION:  (Based on today's evaluation)   Bathing:  Unknown  Dressing:  Unknown  Feeding/Grooming:  Unknown  Toileting:  Unknown  Functional Mobility:  Unknown Bathing:  Maximal Assistance  Dressing: Total Assistance  Feeding/Grooming:  Standby Assistance  Toileting:  Maximal Assistance  Functional Mobility:  Maximal Assistance     ASSESSMENT:  Ms. Abdias Graves is s/p L hip hemiarthroplasty s/p fall and is WBAT LLE. She is oriented to person but not to time. Unsure of prior level of function. Miss Abdias Graves was able to sit edge of bed with total assistance then was assisted to bedside chair with total assistance of 2 with all needs in reach and eating her lunch. She was able to eat her lunch with setup.   Jitendra Chan presents with the following problems and would benefit from occupational therapy to maximize independence with self-care,instrumental activities of daily living, and functional transfers/mobility for activities of daily living/instrumental activities of daily living via the stated goals. SUBJECTIVE:   Ms. Mireya Rice states, \"I'm in 5. \"    SOCIAL HISTORY/LIVING ENVIRONMENT:   Home Environment: Skilled nursing facility  # Steps to Enter: 0  One/Two Story Residence: One story  Living Alone: No  Support Systems: Child(abdiaziz),Caregiver/Home Care Staff    OBJECTIVE:     PAIN: VITAL SIGNS: LINES/DRAINS:   Pre Treatment: Pain Screen  Pain Scale 1: Numeric (0 - 10)  Pain Intensity 1: 2  Post Treatment: no rating given   IV  O2 Device: None (Room air)     GROSS EVALUATION:   Within Functional Limits Abnormal/ Functional Abnormal/ Non-Functional (see comments) Not Tested Comments:   AROM [] [x] [] []    PROM [] [] [] [x]    Strength [] [x] [] []    Balance [] [x] [x] []    Posture [] [x] [] []    Sensation [] [] [] [x]    Coordination [] [x] [] []    Tone [] [] [] [x]    Edema [] [] [] [x]    Activity Tolerance [] [x] [] []     [] [] [] []      COGNITION/  PERCEPTION: Intact Impaired   (see comments) Comments:   Orientation [] [x] Oriented to person only. Vision [] [x] Wears glasses.    Hearing [] []    Judgment/ Insight [] [x]    Attention [x] []    Memory [] [x]    Command Following [x] []    Emotional Regulation [] [x]     [] []      ACTIVITIES OF DAILY LIVING: I Mod I S SBA CGA Min Mod Max Total NT Comments   BASIC ADLs:              Bathing/ Showering [] [] [] [] [] [] [] [] [] [x]    Toileting [] [] [] [] [] [] [] [] [] [x]    Dressing [] [] [] [] [] [] [] [] [x] []    Feeding [] [] [x] [x] [] [] [] [] [] []    Grooming [] [] [] [] [] [] [] [] [] [x]    Personal Device Care [] [] [] [] [] [] [] [] [] [x]    Functional Mobility [] [] [] [] [] [] [] [] [] [x]    I=Independent, Mod I=Modified Independent, S=Supervision, SBA=Standby Assistance, CGA=Contact Guard Assistance,   Min=Minimal Assistance, Mod=Moderate Assistance, Max=Maximal Assistance, Total=Total Assistance, NT=Not Tested    MOBILITY: I Mod I S SBA CGA Min Mod Max Total  NT x2 Comments:   Supine to sit [] [] [] [] [] [] [] [] [x] [] []    Sit to supine [] [] [] [] [] [] [] [] [] [x] []    Sit to stand [] [] [] [] [] [] [] [] [x] [] []    Bed to chair [] [] [] [] [] [] [] [] [x] [] []    I=Independent, Mod I=Modified Independent, S=Supervision, SBA=Standby Assistance, CGA=Contact Guard Assistance,   Min=Minimal Assistance, Mod=Moderate Assistance, Max=Maximal Assistance, Total=Total Assistance, NT=Not [facility] [address]   Daily Activity Inpatient Short Form        How much help from another person does the patient currently need. .. Total A Lot A Little None   1. Putting on and taking off regular lower body clothing? [x] 1   [] 2   [] 3   [] 4   2. Bathing (including washing, rinsing, drying)? [x] 1   [] 2   [] 3   [] 4   3. Toileting, which includes using toilet, bedpan or urinal?   [x] 1   [] 2   [] 3   [] 4   4. Putting on and taking off regular upper body clothing? [] 1   [x] 2   [] 3   [] 4   5. Taking care of personal grooming such as brushing teeth? [] 1   [x] 2   [] 3   [] 4   6. Eating meals? [] 1   [] 2   [x] 3   [] 4   © 2007, [facility] of [address] [place] [address] [address] [id number], under license to [employer]. All rights reserved     Score:  Initial: 10 Most Recent: X (Date: -- )   Interpretation of Tool:  Represents activities that are increasingly more difficult (i.e. Bed mobility, Transfers, Gait).     PLAN:   FREQUENCY/DURATION: OT Plan of Care: 5 times/week for duration of hospital stay or until stated goals are met, whichever comes first.    PROBLEM LIST:   (Skilled intervention is medically necessary to address:)  Decreased ADL/Functional Activities  Decreased Activity Tolerance  Decreased AROM/PROM  Decreased Balance  Decreased Cognition  Decreased Strength  Decreased Transfer Abilities  Increased Pain   INTERVENTIONS PLANNED:   (Benefits and precautions of occupational therapy have been discussed with the patient.)  Self Care Training  Therapeutic Activity  Therapeutic Exercise/HEP  Neuromuscular Re-education  Education     TREATMENT:     EVALUATION: Low Complexity : (Untimed Charge)    TREATMENT:   ($$ Self Care/Home Management: 8-22 mins    )  Co-Treatment PT/OT necessary due to patient's decreased overall endurance/tolerance levels, as well as need for high level skilled assistance to complete functional transfers/mobility and functional tasks  Self Care (8 Minutes): Self care including Lower Body Dressing and Self Feeding to decrease level of assistance required.     TREATMENT GRID:  N/A    AFTER TREATMENT POSITION/PRECAUTIONS:  Alarm Activated, Chair, Needs within reach, and RN notified    INTERDISCIPLINARY COLLABORATION:  RN/PCT, PT/PTA, and OT/GUTIERRES    TOTAL TREATMENT DURATION:  OT Patient Time In/Time Out  Time In: 1124  Time Out: [address] TONNY Hernandez, OTR/L

## 2022-02-26 NOTE — PROGRESS NOTES
Problem: Falls - Risk of  Goal: *Absence of Falls  Description: Document Emery Waldrop Fall Risk and appropriate interventions in the flowsheet. Outcome: Progressing Towards Goal  Note: Fall Risk Interventions:  Mobility Interventions: Bed/chair exit alarm,Communicate number of staff needed for ambulation/transfer    Mentation Interventions: Bed/chair exit alarm,Door open when patient unattended    Medication Interventions: Bed/chair exit alarm    Elimination Interventions: Bed/chair exit alarm,Toileting schedule/hourly rounds    History of Falls Interventions: Door open when patient unattended         Problem: Pressure Injury - Risk of  Goal: *Prevention of pressure injury  Description: Document Paul Scale and appropriate interventions in the flowsheet.   Outcome: Progressing Towards Goal  Note: Pressure Injury Interventions:  Sensory Interventions: Assess changes in LOC    Moisture Interventions: Absorbent underpads    Activity Interventions: PT/OT evaluation,Pressure redistribution bed/mattress(bed type)    Mobility Interventions: Float heels,HOB 30 degrees or less,Pressure redistribution bed/mattress (bed type)    Nutrition Interventions: Offer support with meals,snacks and hydration    Friction and Shear Interventions: Apply protective barrier, creams and emollients,Feet elevated on foot rest,Foam dressings/transparent film/skin sealants,HOB 30 degrees or less

## 2022-02-27 LAB
ANION GAP SERPL CALC-SCNC: 9 MMOL/L (ref 7–16)
BASOPHILS # BLD: 0 K/UL (ref 0–0.2)
BASOPHILS NFR BLD: 1 % (ref 0–2)
BUN SERPL-MCNC: 21 MG/DL (ref 8–23)
CALCIUM SERPL-MCNC: 7.8 MG/DL (ref 8.3–10.4)
CHLORIDE SERPL-SCNC: 104 MMOL/L (ref 98–107)
CO2 SERPL-SCNC: 21 MMOL/L (ref 21–32)
CREAT SERPL-MCNC: 1.1 MG/DL (ref 0.6–1)
DIFFERENTIAL METHOD BLD: ABNORMAL
EOSINOPHIL # BLD: 0.1 K/UL (ref 0–0.8)
EOSINOPHIL NFR BLD: 2 % (ref 0.5–7.8)
ERYTHROCYTE [DISTWIDTH] IN BLOOD BY AUTOMATED COUNT: 14.5 % (ref 11.9–14.6)
GLUCOSE BLD STRIP.AUTO-MCNC: 103 MG/DL (ref 65–100)
GLUCOSE BLD STRIP.AUTO-MCNC: 122 MG/DL (ref 65–100)
GLUCOSE BLD STRIP.AUTO-MCNC: 143 MG/DL (ref 65–100)
GLUCOSE BLD STRIP.AUTO-MCNC: 84 MG/DL (ref 65–100)
GLUCOSE SERPL-MCNC: 79 MG/DL (ref 65–100)
HCT VFR BLD AUTO: 28.3 % (ref 35.8–46.3)
HGB BLD-MCNC: 8.7 G/DL (ref 11.7–15.4)
IMM GRANULOCYTES # BLD AUTO: 0 K/UL (ref 0–0.5)
IMM GRANULOCYTES NFR BLD AUTO: 1 % (ref 0–5)
LYMPHOCYTES # BLD: 1 K/UL (ref 0.5–4.6)
LYMPHOCYTES NFR BLD: 15 % (ref 13–44)
MCH RBC QN AUTO: 29.5 PG (ref 26.1–32.9)
MCHC RBC AUTO-ENTMCNC: 30.7 G/DL (ref 31.4–35)
MCV RBC AUTO: 95.9 FL (ref 79.6–97.8)
MONOCYTES # BLD: 0.8 K/UL (ref 0.1–1.3)
MONOCYTES NFR BLD: 12 % (ref 4–12)
NEUTS SEG # BLD: 4.4 K/UL (ref 1.7–8.2)
NEUTS SEG NFR BLD: 70 % (ref 43–78)
NRBC # BLD: 0 K/UL (ref 0–0.2)
PLATELET # BLD AUTO: 91 K/UL (ref 150–450)
PMV BLD AUTO: 11.7 FL (ref 9.4–12.3)
POTASSIUM SERPL-SCNC: 4.2 MMOL/L (ref 3.5–5.1)
RBC # BLD AUTO: 2.95 M/UL (ref 4.05–5.2)
SERVICE CMNT-IMP: ABNORMAL
SERVICE CMNT-IMP: NORMAL
SODIUM SERPL-SCNC: 134 MMOL/L (ref 136–145)
WBC # BLD AUTO: 6.4 K/UL (ref 4.3–11.1)

## 2022-02-27 PROCEDURE — 74011250637 HC RX REV CODE- 250/637: Performed by: ORTHOPAEDIC SURGERY

## 2022-02-27 PROCEDURE — 97535 SELF CARE MNGMENT TRAINING: CPT

## 2022-02-27 PROCEDURE — 97530 THERAPEUTIC ACTIVITIES: CPT

## 2022-02-27 PROCEDURE — 82962 GLUCOSE BLOOD TEST: CPT

## 2022-02-27 PROCEDURE — 97110 THERAPEUTIC EXERCISES: CPT

## 2022-02-27 PROCEDURE — 65270000029 HC RM PRIVATE

## 2022-02-27 PROCEDURE — 36415 COLL VENOUS BLD VENIPUNCTURE: CPT

## 2022-02-27 PROCEDURE — 74011000250 HC RX REV CODE- 250: Performed by: ORTHOPAEDIC SURGERY

## 2022-02-27 PROCEDURE — 74011250637 HC RX REV CODE- 250/637: Performed by: INTERNAL MEDICINE

## 2022-02-27 PROCEDURE — 85025 COMPLETE CBC W/AUTO DIFF WBC: CPT

## 2022-02-27 PROCEDURE — 80048 BASIC METABOLIC PNL TOTAL CA: CPT

## 2022-02-27 PROCEDURE — 97112 NEUROMUSCULAR REEDUCATION: CPT

## 2022-02-27 PROCEDURE — 74011250636 HC RX REV CODE- 250/636: Performed by: INTERNAL MEDICINE

## 2022-02-27 PROCEDURE — 2709999900 HC NON-CHARGEABLE SUPPLY

## 2022-02-27 RX ADMIN — ACETAMINOPHEN 650 MG: 325 TABLET ORAL at 05:32

## 2022-02-27 RX ADMIN — LATANOPROST 1 DROP: 50 SOLUTION OPHTHALMIC at 21:58

## 2022-02-27 RX ADMIN — FENOFIBRATE 160 MG: 160 TABLET ORAL at 10:55

## 2022-02-27 RX ADMIN — GABAPENTIN 100 MG: 100 CAPSULE ORAL at 21:57

## 2022-02-27 RX ADMIN — APIXABAN 2.5 MG: 2.5 TABLET, FILM COATED ORAL at 18:15

## 2022-02-27 RX ADMIN — SODIUM CHLORIDE, PRESERVATIVE FREE 10 ML: 5 INJECTION INTRAVENOUS at 05:32

## 2022-02-27 RX ADMIN — SODIUM CHLORIDE, PRESERVATIVE FREE 10 ML: 5 INJECTION INTRAVENOUS at 13:37

## 2022-02-27 RX ADMIN — ACETAMINOPHEN 650 MG: 325 TABLET ORAL at 21:57

## 2022-02-27 RX ADMIN — Medication 1 TABLET: at 10:52

## 2022-02-27 RX ADMIN — CETIRIZINE HYDROCHLORIDE 5 MG: 5 TABLET ORAL at 10:55

## 2022-02-27 RX ADMIN — GABAPENTIN 100 MG: 100 CAPSULE ORAL at 10:54

## 2022-02-27 RX ADMIN — SODIUM CHLORIDE 1000 ML: 900 INJECTION, SOLUTION INTRAVENOUS at 11:03

## 2022-02-27 RX ADMIN — GABAPENTIN 100 MG: 100 CAPSULE ORAL at 18:14

## 2022-02-27 RX ADMIN — SODIUM CHLORIDE, PRESERVATIVE FREE 10 ML: 5 INJECTION INTRAVENOUS at 22:08

## 2022-02-27 RX ADMIN — SODIUM CHLORIDE, PRESERVATIVE FREE 10 ML: 5 INJECTION INTRAVENOUS at 13:36

## 2022-02-27 RX ADMIN — Medication 1 TABLET: at 12:50

## 2022-02-27 RX ADMIN — PRAVASTATIN SODIUM 80 MG: 80 TABLET ORAL at 21:57

## 2022-02-27 RX ADMIN — SODIUM CHLORIDE, PRESERVATIVE FREE 20 ML: 5 INJECTION INTRAVENOUS at 21:58

## 2022-02-27 RX ADMIN — Medication 1 TABLET: at 18:15

## 2022-02-27 RX ADMIN — SODIUM CHLORIDE, PRESERVATIVE FREE 10 ML: 5 INJECTION INTRAVENOUS at 05:33

## 2022-02-27 NOTE — DISCHARGE INSTRUCTIONS
[employer]    IF YOU HAVE ANY PROBLEMS ONCE YOU ARE AT HOME CALL THE FOLLOWING NUMBERS:   Main office number: [phone] ask for Clint Goodpasture (medical assistant with Dr. Scar Goff)  Office Address: [facility] [address] [address],[address], [address] [facility]      Patient Discharge Instructions    Arthur Oliveros / [id number] : 1935    Admitted 2022 Discharged: 2022         To be given to [address] on Admission         Weight bearing status: As tolerated with walker and assistance    Activity  · Continue Physical Therapy and Occupational Therapy   · Fall precautions     Wound Care   Dry dressing changes using sterile technique every other day or more frequently if needed    Staples are to be left in and removed in our office 2 weeks postop    Diet  · Resume regular or diabetic diet      Medications    · Patient medications are listed on the medication reconciliation sheet. Follow up    Follow up in our office in 2 weeks postop    All patients are to be transported via stretcher unless they are able to independently get out of a chair and stand without assistance. Information obtained by :  I understand that if any problems occur once I am at home I am to contact my physician. I understand and acknowledge receipt of the instructions indicated above.                                                                                                                                            Physician's or R.N.'s Signature                                                                  Date/Time                                                                                                                                              Patient or Representative Signature                                                          Date/Time

## 2022-02-27 NOTE — PROGRESS NOTES
2022         Post Op day: 2 Days Post-Op Procedure(s) (LRB):  LEFT HIP HEMIARTHROPLASTY CHOICE TO BE ADMITTED (Left)      Admit Date: 2022  Admit Diagnosis: Femur fracture, left (HCC) [S72.92XA]  CHAVA (acute kidney injury) ([facility] [address] [N17.9]       Principle Problem: Femur fracture, left ([facility] [address] Subjective: Doing well, No complaints, No SOB, No Chest Pain, No Nausea or Vomiting     Objective:   Vital Signs are Stable, No Acute Distress, Alert,  Dressing is Dry,  Neurovascular exam is normal.     Assessment / Plan :  Patient Active Problem List   Diagnosis Code    Rhabdomyolysis M62.82    Increased lactic acid level R79.89    SIRS (systemic inflammatory response syndrome) ([facility]) R65.10    Diabetes ([facility]) E11.9    Hyperkalemia E87.5    Hypothermia T68. XXXA    High anion gap metabolic acidosis M64.3    Atrial fibrillation with RVR ([facility]) I48.91    Elevated TSH R79.89    Hypomagnesemia E83.42    Obesity E66.9    Essential hypertension with goal blood pressure less than 140/90 I10    UTI (urinary tract infection) N39.0    Scalp hematoma S00. 03XA    Thyroid enlargement E04.9    Femur fracture, left ([facility]) S72. 92XA    CHAVA (acute kidney injury) ([facility] [address] N17.9      Patient Vitals for the past 8 hrs:   BP Temp Pulse Resp SpO2   22 0429 (!) 101/46 98.5 °F (36.9 °C) 89 18 94 %    Temp (24hrs), Av.3 °F (36.8 °C), Min:98.1 °F (36.7 °C), Max:98.5 °F (36.9 °C)    Body mass index is 27.59 kg/m².     Lab Results   Component Value Date/Time    HGB 8.7 (L) 2022 04:50 AM          S/P Procedure(s) (LRB):  LEFT HIP HEMIARTHROPLASTY CHOICE TO BE ADMITTED (Left)      Medical Mgmt per hospitalist  Anticoagulation plan: Sebastian Cisneros to resume eliquis from an orthopedic standpoint   Continue PT  Fall Precautions  DC disp: rehab placement  F/U: 2 weeks postop for wound check and staple removal        Signed By: SHELLEY Mendez  2022,  10:10 AM

## 2022-02-27 NOTE — PROGRESS NOTES
Hospitalist Progress Note   Admit Date:  2022 11:08 AM   Name:  Vicente Abreu   Age:  81 y.o. Sex:  female  :  1935   MRN:  [id number]   Room:  726/01    Presenting Complaint: Fall    Reason(s) for Admission: Femur fracture, left ([facility] [address] [S72.92XA]  CHAVA (acute kidney injury) [facility]) [N17.9]     Hospital Course & Interval History:   Patient is a 81 y.o. female who presented to the ED for cc left hip pain after a fall when tripping over her wheel chair. Denies syncope or dizziness. Hx of DM type II, HTN, HLD, depression, CKD stage 3, paroxysmal a fib on Eliquis (unsure if her last dose was last night or this AM), and left heel wound. Currently at rehab center for generalized weakness.      Vitals- stable     Labs- Hg 9.5 (was 11 on 21)--denies obvious bleeding, platelets 496, creatine 1.9 from baseline 1.4     Hip x ray with left femoral neck fracture    Subjective/24hr Events (22): Patient seen and examined. Patient alert to person and that she had a surgery. Patient not alert to place or time or will come to surgery she had. Patient is a poor historian and is poorly able to participate my examination. ROS:  10 systems reviewed and negative except as noted above. Assessment & Plan:   Left femoral neck fracture - Consult ortho. Will allow to eat since typically we hold Eliquis 48 hours before surgery. At moderate risk for surgical and post surgical complications. Will repeat ECHO since RV pressure noted to be elevated in . No hx of CHF.   -orthopedic surgery consulted and holding Eliquis. Activity level weightbearing status surgical intervention and anticoagulation are per orthopedic surgery  -surgical intervention today. Weightbearing status anticoagulation and pain management per orthopedic surgery  -Per orthopedic surgeon  -we will resume patient's Eliquis per recommendations of orthopedic surgery.   Activity level weightbearing status are per orthopedic surgery. Hyperkalemia-due to pending surgical intervention today we will start patient on one-time dose of IV insulin follow-up with D5W to avoid hypoglycemia. Additionally will give the patient a dose of Lokelma. Will monitor closely. Subsequent BMP obtained at 10 AM demonstrated resolution of hypokalemia with initiation of treatment  2/25-resolved      Hypotension-2/26 patient not on any antihypertensive medications and may be secondary to lasting effects of anesthesia. We will bolus the patient 1 L of normal saline at this time and monitor. 2/27-persistent. Will again bolus the patient 1 L of normal saline and will hold patient's opiates as this may be causing patient's underlying hypotension. Bradycardia-2/26-we will hold patient's propanolol and monitor. will start patient on telemetry and monitor. Likely secondary to effects of anesthesia  2/27-resolved with discontinuation of patient's propanolol.         CHAVA - Pre renal. Dry on exam. Gentle fluids. Hold Zoloft. 2/24-improving however not to baseline. Continue IV fluids and monitor BMP closely  2/25-resolved with baseline. Given patient n.p.o. for most of the day we will continue IV fluids and consider discontinuation in a.m.  2/26-resolved  2/27- resolved      Dm type II - SS.  A1C 6.9 in Dec which is very well controlled in an 81year old.   2/26-hemoglobin A1c noted to be 7.4 which given the patient's advanced age of 81 would be at goal.            HLD - fenofibrate/statin               DVT prophylaxis -Eliquis    Diet regular diet    Code status: DNR    Hospital Problems as of 2/27/2022 Never Reviewed          Codes Class Noted - Resolved POA    * (Principal) Femur fracture, left ([facility] [address] ICD-10-CM: B79.88SX  ICD-9-CM: 821.00  2/23/2022 - Present Unknown        CHAVA (acute kidney injury) ([facility] [address] ICD-10-CM: N17.9  ICD-9-CM: 584.9  2/23/2022 - Present Unknown        Thyroid enlargement ICD-10-CM: E04.9  ICD-9-CM: 240.9  12/13/2021 - Present Yes Essential hypertension with goal blood pressure less than 140/90 ICD-10-CM: I10  ICD-9-CM: 401.9  8/25/2016 - Present Yes        Diabetes ([facility] [address] ICD-10-CM: E11.9  ICD-9-CM: 250.00  11/13/2014 - Present Yes              Objective:     Patient Vitals for the past 24 hrs:   Temp Pulse Resp BP SpO2   02/27/22 1119 99.2 °F (37.3 °C) 81 17 (!) 82/64 100 %   02/27/22 0429 98.5 °F (36.9 °C) 89 18 (!) 101/46 94 %   02/26/22 2340 98.4 °F (36.9 °C) 77 18 (!) 97/57 96 %   02/26/22 1933 98.1 °F (36.7 °C) 72 16 (!) 108/47 98 %   02/26/22 1631     97 %   02/26/22 1531 98.1 °F (36.7 °C) 82 16 (!) 104/43 95 %   02/26/22 1337    (!) 106/41      Oxygen Therapy  O2 Sat (%): 100 % (02/27/22 1119)  Pulse via Oximetry: 68 beats per minute (02/26/22 1631)  O2 Device: Nasal cannula (02/26/22 1631)  Skin Assessment: Clean, dry, & intact (02/24/22 0720)  Skin Protection for O2 Device: No (02/24/22 0720)  O2 Flow Rate (L/min): 2 l/min (02/26/22 1631)    Estimated body mass index is 27.59 kg/m² as calculated from the following:    Height as of this encounter: 5' 1\" (1.549 m). Weight as of this encounter: 66.2 kg (146 lb). No intake or output data in the 24 hours ending 02/27/22 1324      Physical Exam:     Blood pressure (!) 82/64, pulse 81, temperature 99.2 °F (37.3 °C), resp. rate 17, height 5' 1\" (1.549 m), weight 66.2 kg (146 lb), SpO2 100 %, not currently breastfeeding. General:    Well nourished. No overt distress  Head:  Normocephalic, atraumatic  Eyes:  Sclerae appear normal.  Pupils equally round. ENT:  Nares appear normal, no drainage. Moist oral mucosa  Neck:  No restricted ROM. Trachea midline   CV:   RRR. No m/r/g. No jugular venous distension. Lungs:   CTAB. No wheezing, rhonchi, or rales. Respirations even, unlabored  Abdomen: Bowel sounds present. Soft, nontender, nondistended. Extremities: No cyanosis or clubbing. No edema  Skin:     No rashes and normal coloration. Warm and dry.     Neuro:  CN II-XII grossly intact. Sensation intact. A&Ox3  Psych:  Normal mood and affect. I have reviewed ordered lab tests and independently visualized imaging below:    Recent Labs:  Recent Results (from the past 48 hour(s))   GLUCOSE, POC    Collection Time: 02/25/22  4:30 PM   Result Value Ref Range    Glucose (POC) 111 (H) 65 - 100 mg/dL    Performed by Keyla    GLUCOSE, POC    Collection Time: 02/25/22  9:09 PM   Result Value Ref Range    Glucose (POC) 154 (H) 65 - 100 mg/dL    Performed by [facility]    CBC WITH AUTOMATED DIFF    Collection Time: 02/26/22  4:27 AM   Result Value Ref Range    WBC 6.5 4.3 - 11.1 K/uL    RBC 3.29 (L) 4.05 - 5.2 M/uL    HGB 9.7 (L) 11.7 - 15.4 g/dL    HCT 30.7 (L) 35.8 - 46.3 %    MCV 93.3 79.6 - 97.8 FL    MCH 29.5 26.1 - 32.9 PG    MCHC 31.6 31.4 - 35.0 g/dL    RDW 14.4 11.9 - 14.6 %    PLATELET 625 (L) 146 - 450 K/uL    MPV 12.3 9.4 - 12.3 FL    ABSOLUTE NRBC 0.00 0.0 - 0.2 K/uL    DF AUTOMATED      NEUTROPHILS 67 43 - 78 %    LYMPHOCYTES 20 13 - 44 %    MONOCYTES 10 4.0 - 12.0 %    EOSINOPHILS 2 0.5 - 7.8 %    BASOPHILS 1 0.0 - 2.0 %    IMMATURE GRANULOCYTES 1 0.0 - 5.0 %    ABS. NEUTROPHILS 4.4 1.7 - 8.2 K/UL    ABS. LYMPHOCYTES 1.3 0.5 - 4.6 K/UL    ABS. MONOCYTES 0.6 0.1 - 1.3 K/UL    ABS. EOSINOPHILS 0.1 0.0 - 0.8 K/UL    ABS. BASOPHILS 0.0 0.0 - 0.2 K/UL    ABS. IMM.  GRANS. 0.0 0.0 - 0.5 K/UL   METABOLIC PANEL, BASIC    Collection Time: 02/26/22  4:27 AM   Result Value Ref Range    Sodium 130 (L) 136 - 145 mmol/L    Potassium 4.1 3.5 - 5.1 mmol/L    Chloride 98 98 - 107 mmol/L    CO2 26 21 - 32 mmol/L    Anion gap 6 (L) 7 - 16 mmol/L    Glucose 108 (H) 65 - 100 mg/dL    BUN 23 8 - 23 MG/DL    Creatinine 1.30 (H) 0.6 - 1.0 MG/DL    GFR est AA 50 (L) >60 ml/min/1.73m2    GFR est non-AA 41 (L) >60 ml/min/1.73m2    Calcium 7.5 (L) 8.3 - 10.4 MG/DL   GLUCOSE, POC    Collection Time: 02/26/22  6:18 AM   Result Value Ref Range    Glucose (POC) 145 (H) 65 - 100 mg/dL Performed by Olimpia    GLUCOSE, POC    Collection Time: 02/26/22 11:20 AM   Result Value Ref Range    Glucose (POC) 203 (H) 65 - 100 mg/dL    Performed by Jude    GLUCOSE, POC    Collection Time: 02/26/22  4:05 PM   Result Value Ref Range    Glucose (POC) 204 (H) 65 - 100 mg/dL    Performed by Gabriel    GLUCOSE, POC    Collection Time: 02/26/22  8:57 PM   Result Value Ref Range    Glucose (POC) 82 65 - 100 mg/dL    Performed by Olimpia    CBC WITH AUTOMATED DIFF    Collection Time: 02/27/22  4:50 AM   Result Value Ref Range    WBC 6.4 4.3 - 11.1 K/uL    RBC 2.95 (L) 4.05 - 5.2 M/uL    HGB 8.7 (L) 11.7 - 15.4 g/dL    HCT 28.3 (L) 35.8 - 46.3 %    MCV 95.9 79.6 - 97.8 FL    MCH 29.5 26.1 - 32.9 PG    MCHC 30.7 (L) 31.4 - 35.0 g/dL    RDW 14.5 11.9 - 14.6 %    PLATELET 91 (L) 485 - 450 K/uL    MPV 11.7 9.4 - 12.3 FL    ABSOLUTE NRBC 0.00 0.0 - 0.2 K/uL    DF AUTOMATED      NEUTROPHILS 70 43 - 78 %    LYMPHOCYTES 15 13 - 44 %    MONOCYTES 12 4.0 - 12.0 %    EOSINOPHILS 2 0.5 - 7.8 %    BASOPHILS 1 0.0 - 2.0 %    IMMATURE GRANULOCYTES 1 0.0 - 5.0 %    ABS. NEUTROPHILS 4.4 1.7 - 8.2 K/UL    ABS. LYMPHOCYTES 1.0 0.5 - 4.6 K/UL    ABS. MONOCYTES 0.8 0.1 - 1.3 K/UL    ABS. EOSINOPHILS 0.1 0.0 - 0.8 K/UL    ABS. BASOPHILS 0.0 0.0 - 0.2 K/UL    ABS. IMM.  GRANS. 0.0 0.0 - 0.5 K/UL   METABOLIC PANEL, BASIC    Collection Time: 02/27/22  4:50 AM   Result Value Ref Range    Sodium 134 (L) 136 - 145 mmol/L    Potassium 4.2 3.5 - 5.1 mmol/L    Chloride 104 98 - 107 mmol/L    CO2 21 21 - 32 mmol/L    Anion gap 9 7 - 16 mmol/L    Glucose 79 65 - 100 mg/dL    BUN 21 8 - 23 MG/DL    Creatinine 1.10 (H) 0.6 - 1.0 MG/DL    GFR est AA >60 >60 ml/min/1.73m2    GFR est non-AA 50 (L) >60 ml/min/1.73m2    Calcium 7.8 (L) 8.3 - 10.4 MG/DL   GLUCOSE, POC    Collection Time: 02/27/22  6:39 AM   Result Value Ref Range    Glucose (POC) 143 (H) 65 - 100 mg/dL    Performed by [facility]    GLUCOSE, POC Collection Time: 02/27/22 11:20 AM   Result Value Ref Range    Glucose (POC) 84 65 - 100 mg/dL    Performed by [facility]        All Micro Results     Procedure Component Value Units Date/Time    COVID-19 RAPID TEST [[id number]]  (Abnormal) Collected: 02/23/22 1852    Order Status: Completed Specimen: Nasopharyngeal Updated: 02/23/22 1934     Specimen source NASAL        COVID-19 rapid test Detected        Comment:      The specimen is POSITIVE for SARS-CoV-2, the novel coronavirus associated with COVID-19. This test has been authorized by the FDA under an Emergency Use Authorization (EUA) for use by authorized laboratories. Fact sheet for Healthcare Providers: 15MinutesNOW.co.nz  Fact sheet for Patients: 15MinutesNOW.co.nz       Methodology: Isothermal Nucleic Acid Amplification  RESULTS VERIFIED, PHONED TO AND READ BACK BY  SANDRA MUNOZ AT 1934 2.23.22 EOR               Other Studies:  No results found.     Current Meds:  Current Facility-Administered Medications   Medication Dose Route Frequency    apixaban (ELIQUIS) tablet 2.5 mg  2.5 mg Oral BID    morphine injection 2 mg  2 mg IntraVENous Q1H PRN    [Held by provider] oxyCODONE IR (ROXICODONE) tablet 5 mg  5 mg Oral Q4H PRN    sodium chloride (NS) flush 5-40 mL  5-40 mL IntraVENous Q8H    sodium chloride (NS) flush 5-40 mL  5-40 mL IntraVENous PRN    alum-mag hydroxide-simeth (MYLANTA) oral suspension 30 mL  30 mL Oral Q4H PRN    calcium-vitamin D (OS-CHELSEY +D3) 500 mg-200 unit per tablet 1 Tablet  1 Tablet Oral TID WITH MEALS    dextrose 40% (GLUTOSE) oral gel 1 Tube  15 g Oral PRN    glucagon (GLUCAGEN) injection 1 mg  1 mg IntraMUSCular PRN    dextrose 10% infusion 125-250 mL  125-250 mL IntraVENous PRN    fenofibrate (LOFIBRA) tablet 160 mg  160 mg Oral DAILY    gabapentin (NEURONTIN) capsule 100 mg  100 mg Oral TID    latanoprost (XALATAN) 0.005 % ophthalmic solution 1 Drop  1 Drop Both Eyes QHS    cetirizine (ZYRTEC) tablet 5 mg  5 mg Oral DAILY    pravastatin (PRAVACHOL) tablet 80 mg  80 mg Oral QHS    [Held by provider] propranoloL (INDERAL) tablet 20 mg  20 mg Oral BID    sodium chloride (NS) flush 5-40 mL  5-40 mL IntraVENous Q8H    sodium chloride (NS) flush 5-40 mL  5-40 mL IntraVENous PRN    acetaminophen (TYLENOL) tablet 650 mg  650 mg Oral Q6H PRN    Or    acetaminophen (TYLENOL) suppository 650 mg  650 mg Rectal Q6H PRN    polyethylene glycol (MIRALAX) packet 17 g  17 g Oral DAILY PRN    ondansetron (ZOFRAN ODT) tablet 4 mg  4 mg Oral Q8H PRN    Or    ondansetron (ZOFRAN) injection 4 mg  4 mg IntraVENous Q6H PRN    insulin lispro (HUMALOG) injection   SubCUTAneous AC&HS       Signed:  Kashif Akhtar DO    Part of this note may have been written by using a voice dictation software. The note has been proof read but may still contain some grammatical/other typographical errors.

## 2022-02-27 NOTE — PROGRESS NOTES
ACUTE PHYSICAL THERAPY GOALS:  (Developed with and agreed upon by patient and/or caregiver.)  STG:  (1.)Ms. Aditi Hernández will move from supine to sit and sit to supine , scoot up and down and roll side to side with MODERATE ASSIST within 4 treatment day(s). (2.)Ms. Aditi Hernández will transfer from bed to chair and chair to bed with MODERATE ASSIST using the least restrictive device within 4 treatment day(s). (3.)Ms. Aditi Hernández will sit in bedside chair up to 6 hours within 4 treatment day(s).      LTG:  (1.)Ms. Aditi Hernández will move from supine to sit and sit to supine , scoot up and down and roll side to side in bed with MINIMAL ASSIST within 7 treatment day(s). (2.)Ms. Aditi Hernández will transfer from bed to chair and chair to bed with MINIMAL ASSIST using the least restrictive device within 7 treatment day(s). (3.)Ms. Aditi Hernández will sit in bedside chair up to 8 hours within 7 treatment day(s)      PHYSICAL THERAPY: Daily Note and AM Treatment Day # 2   WBAT LLE    Binu Jeffery is a 81 y.o. female   PRIMARY DIAGNOSIS: Femur fracture, left (HCC)  Femur fracture, left (HCC) [S72.92XA]  CHAVA (acute kidney injury) ([facility] [address] [N17.9]  Procedure(s) (LRB):  LEFT HIP HEMIARTHROPLASTY CHOICE TO BE ADMITTED (Left)  2 Days Post-Op    ASSESSMENT:     REHAB RECOMMENDATIONS: CURRENT LEVEL OF FUNCTION:  (Most Recently Demonstrated)   Recommendation to date pending progress:  Setting:   Short-term Rehab  Equipment:    To Be Determined Bed Mobility:   Maximal Assistance x 2  Sit to Stand:   Moderate Assistance x 2  Transfers:   Moderate Assistance x 2  Gait/Mobility:   Not tested     ASSESSMENT:  Ms. Aditi Hernández is supine in bed and not very agreeable to anything. Co treat with OT. Patient states that she wants to use the bathroom. Appears confused as she is perseverating on \"people pretending\"  O2 is not quite in her nose completely however the patient will not allow this writer to p[lace it correctly but did eventually.   Bed mobility is with max assist x 2 as the patient is not assisting in fact she is resisting. Transferred to the [facility] then to the recliner with mod assist.  Patient is positioned for comfort in the recliner with alarm intact and needs within reach, alarm intact. Continue PT efforts. Rehab at discharge.      SUBJECTIVE:   Ms. Eliecer Huffman states, \"All those people are pretending\"\"    SOCIAL HISTORY/ LIVING ENVIRONMENT: see eval  Home Environment: Skilled nursing facility  # Steps to Enter: 0  One/Two Story Residence: One story  Living Alone: No  Support Systems: Child(abdiaziz),Caregiver/Home Care Staff  OBJECTIVE:     PAIN: VITAL SIGNS: LINES/DRAINS:   Pre Treatment: Pain Screen  Pain Scale 1: Numeric (0 - 10)  Pain Intensity 1: 0/10  Post Treatment: 0/10   O2  O2 Device: Nasal cannula     MOBILITY: I Mod I S SBA CGA Min Mod Max Total  NT x2 Comments:   Bed Mobility    Rolling [] [] [] [] [] [] [] [x] [] [] []    Supine to Sit [] [] [] [] [] [] [] [x] [] [] [x]    Scooting [] [] [] [] [] [] [] [x] [] [] []    Sit to Supine [] [] [] [] [] [] [] [] [] [] []    Transfers    Sit to Stand [] [] [] [] [] [] [x] [] [] [] [x]    Bed to Chair [] [] [] [] [] [] [x] [] [] [] [x]    Stand to Sit [] [] [] [] [] [] [x] [] [] [] [x]    I=Independent, Mod I=Modified Independent, S=Supervision, SBA=Standby Assistance, CGA=Contact Guard Assistance,   Min=Minimal Assistance, Mod=Moderate Assistance, Max=Maximal Assistance, Total=Total Assistance, NT=Not Tested    BALANCE: Good Fair+ Fair Fair- Poor NT Comments   Sitting Static [] [] [] [] [x] []    Sitting Dynamic [] [] [] [] [x] []              Standing Static [] [] [] [] [x] []    Standing Dynamic [] [] [] [] [x] []      GAIT: I Mod I S SBA CGA Min Mod Max Total  NT x2 Comments:   Level of Assistance [] [] [] [] [] [] [] [] [] [x] []    Distance     DME N/A    Gait Quality     Weightbearing  Status WBAT  LLE     I=Independent, Mod I=Modified Independent, S=Supervision, SBA=Standby Assistance, CGA=Contact Guard Assistance,   Min=Minimal Assistance, Mod=Moderate Assistance, Max=Maximal Assistance, Total=Total Assistance, NT=Not Tested    PLAN:   FREQUENCY/DURATION: PT Plan of Care: BID for duration of hospital stay or until stated goals are met, whichever comes first.  TREATMENT:     TREATMENT:   ($$ Therapeutic Activity: 38-52 mins    )  Co-Treatment PT/OT necessary due to patient's decreased overall endurance/tolerance levels, as well as need for high level skilled assistance to complete functional transfers/mobility and functional tasks  Therapeutic Activity (38 Minutes): Therapeutic activity included Rolling, Supine to Sit, Scooting, Lateral Scooting, Transfer Training, Sitting balance  and Standing balance to improve functional Mobility, Strength and Activity tolerance.     TREATMENT GRID:  N/A    AFTER TREATMENT POSITION/PRECAUTIONS:  Alarm Activated, Chair, Needs within reach and RN notified    INTERDISCIPLINARY COLLABORATION:  RN/PCT, PT/PTA and OT/GUTIERRES    TOTAL TREATMENT DURATION:  PT Patient Time In/Time Out  Time In: 1007  Time Out: Carlito Akers-Enio, PTA

## 2022-02-27 NOTE — PROGRESS NOTES
ACUTE PHYSICAL THERAPY GOALS:  (Developed with and agreed upon by patient and/or caregiver.)  STG:  (1.)Ms. Mireya Rice will move from supine to sit and sit to supine , scoot up and down and roll side to side with MODERATE ASSIST within 4 treatment day(s). (2.)Ms. Mireya Rice will transfer from bed to chair and chair to bed with MODERATE ASSIST using the least restrictive device within 4 treatment day(s). (3.)Ms. Mireya Rice will sit in bedside chair up to 6 hours within 4 treatment day(s).      LTG:  (1.)Ms. Mireya Rice will move from supine to sit and sit to supine , scoot up and down and roll side to side in bed with MINIMAL ASSIST within 7 treatment day(s). (2.)Ms. Mireya Rice will transfer from bed to chair and chair to bed with MINIMAL ASSIST using the least restrictive device within 7 treatment day(s). (3.)Ms. Mireya Rice will sit in bedside chair up to 8 hours within 7 treatment day(s)      PHYSICAL THERAPY: Daily Note and PM Treatment Day # 2   WBAT LLE    Isidro Bean is a 81 y.o. female   PRIMARY DIAGNOSIS: Femur fracture, left ([facility] [address]  Femur fracture, left ([facility]) [S72.92XA]  CHAVA (acute kidney injury) ([facility] [address] [N17.9]  Procedure(s) (LRB):  LEFT HIP HEMIARTHROPLASTY CHOICE TO BE ADMITTED (Left)  2 Days Post-Op    ASSESSMENT:     REHAB RECOMMENDATIONS: CURRENT LEVEL OF FUNCTION:  (Most Recently Demonstrated)   Recommendation to date pending progress:  Setting:   Short-term Rehab  Equipment:    To Be Determined Bed Mobility:   Maximal Assistance x 2  Sit to Stand:   Moderate Assistance x 2  Transfers:   Moderate Assistance x 2  Gait/Mobility:   Not tested     ASSESSMENT:  Ms. Mireya Rice is supine in bed and not very agreeable to anything. Co treat with OT. Patient states that she wants to use the bathroom. Appears confused as she is perseverating on \"people pretending\"  O2 is not quite in her nose completely however the patient will not allow this writer to p[lace it correctly but did eventually.   Bed mobility is with max assist x 2 as the patient is not assisting in fact she is resisting. Transferred to the [facility] then to the recliner with mod assist.  Patient is positioned for comfort in the recliner with alarm intact and needs within reach, alarm intact. Continue PT efforts. Rehab at discharge. PM Note:  Patient returned to supine bed with max assist x 2 however the patient is doing better, not as anxious, but still quite chatty. Positioned for comfort with needs within reach and alarm set. Exercises performed LLE active assisted.      SUBJECTIVE:   Ms. Abdias Graves states, \"I don't think so\"    SOCIAL HISTORY/ LIVING ENVIRONMENT: see eval  Home Environment: Skilled nursing facility  # Steps to Enter: 0  One/Two Story Residence: One story  Living Alone: No  Support Systems: Child(abdiaziz),Caregiver/Home Care Staff  OBJECTIVE:     PAIN: VITAL SIGNS: LINES/DRAINS:   Pre Treatment: Pain Screen  Pain Scale 1: Numeric (0 - 10)  Pain Intensity 1: 0/10  Post Treatment: 0/10   O2  O2 Device: Nasal cannula     MOBILITY: I Mod I S SBA CGA Min Mod Max Total  NT x2 Comments:   Bed Mobility    Rolling [] [] [] [] [] [] [] [x] [] [] []    Supine to Sit [] [] [] [] [] [] [] [x] [] [] [x]    Scooting [] [] [] [] [] [] [] [x] [] [] []    Sit to Supine [] [] [] [] [] [] [] [] [] [] []    Transfers    Sit to Stand [] [] [] [] [] [] [x] [] [] [] [x]    Bed to Chair [] [] [] [] [] [] [x] [] [] [] [x]    Stand to Sit [] [] [] [] [] [] [x] [] [] [] [x]    I=Independent, Mod I=Modified Independent, S=Supervision, SBA=Standby Assistance, CGA=Contact Guard Assistance,   Min=Minimal Assistance, Mod=Moderate Assistance, Max=Maximal Assistance, Total=Total Assistance, NT=Not Tested    BALANCE: Good Fair+ Fair Fair- Poor NT Comments   Sitting Static [] [] [] [] [x] []    Sitting Dynamic [] [] [] [] [x] []              Standing Static [] [] [] [] [x] []    Standing Dynamic [] [] [] [] [x] []      GAIT: I Mod I S SBA CGA Min Mod Max Total  NT x2 Comments:   Level of Assistance [] [] [] [] [] [] [] [] [] [x] []    Distance     DME N/A    Gait Quality     Weightbearing  Status WBAT  LLE     I=Independent, Mod I=Modified Independent, S=Supervision, SBA=Standby Assistance, CGA=Contact Guard Assistance,   Min=Minimal Assistance, Mod=Moderate Assistance, Max=Maximal Assistance, Total=Total Assistance, NT=Not Tested    PLAN:   FREQUENCY/DURATION: PT Plan of Care: BID for duration of hospital stay or until stated goals are met, whichever comes first.  TREATMENT:     TREATMENT:   ($$ Therapeutic Activity: 8-22 mins  $$ Therapeutic Exercises: 8-22 mins    )  Co-Treatment PT/OT necessary due to patient's decreased overall endurance/tolerance levels, as well as need for high level skilled assistance to complete functional transfers/mobility and functional tasks  Therapeutic Activity (10 Minutes): Therapeutic activity included Rolling, Supine to Sit, Scooting, Lateral Scooting, Transfer Training, Sitting balance  and Standing balance to improve functional Mobility, Strength and Activity tolerance. Therapeutic Exercise (13 Minutes): Therapeutic exercises noted below to improve functional activity tolerance, AROM, strength and mobility.      TREATMENT GRID:     Date:  02/27/22 Date:   Date:     ACTIVITY/EXERCISE AM PM AM PM AM PM   SAQ 15        Ankle pumps 20        abduction 15                                            B = bilateral; AA = active assistive; A = active; P = passive    AFTER TREATMENT POSITION/PRECAUTIONS:  Alarm Activated, Bed, Needs within reach and RN notified    INTERDISCIPLINARY COLLABORATION:  RN/PCT and PT/PTA    TOTAL TREATMENT DURATION:  PT Patient Time In/Time Out  Time In: 1309  Time Out: 1332    Brianda Hart PTA

## 2022-02-27 NOTE — PROGRESS NOTES
ACUTE OT GOALS:  (Developed with and agreed upon by patient and/or caregiver.)  1. Complete functional transfers (I.e. toilet) with moderate assistance or less. 2. Complete upper body bathing and dressing with standby assistance. 3. Complete B UE strengthening 15-20 minutes in preparation for therapeutic activities/ADL. 4. Complete grooming/oral care with standby assistance. Time frame: 4 - 7 visits. OCCUPATIONAL THERAPY: Daily Note OT Treatment Day # 2    Juju Amin is a 81 y.o. female   PRIMARY DIAGNOSIS: Femur fracture, left (HCC)  Femur fracture, left (HCC) [S72.92XA]  CHAVA (acute kidney injury) ([facility] [address] [N17.9]  Procedure(s) (LRB):  LEFT HIP HEMIARTHROPLASTY CHOICE TO BE ADMITTED (Left)  2 Days Post-Op  [place] / [place] / Product Type: Medicare /   ASSESSMENT:     REHAB RECOMMENDATIONS: CURRENT LEVEL OF FUNCTION:  (Most Recently Demonstrated)   Recommendation to date pending progress:  Setting:   Short-term Rehab  Equipment:    To Be Determined Bathing:   Not tested  Dressing:   Not tested  Feeding/Grooming:   Minimal Assistance for washing/combing hair  Toileting:   Moderate Assistance for bladder hygiene  Functional Mobility:   Maximal Assistance x 2     ASSESSMENT:  Ms. Anatoliy Mccarthy is doing okay today. Pt continue to be altered. Pt required max/total A x2 for bed mobility. Pt demonstrates fair sitting balance at EOB. Pt required max A x2 for all transfers today. Pt was able to void and required some assistance with bladder hygiene. Pt then was transferred over to recliner. Pt did not want to participate today. Required some extra time and encouragement to participate. Minimal to no progress made today. Will continue to benefit from skilled OT during stay.      SUBJECTIVE:   Ms. Anatoliy Mccarthy states, \"I don't want to\"    SOCIAL HISTORY/LIVING ENVIRONMENT:   Home Environment: Skilled nursing facility  # Steps to Enter: 0  One/Two Story Residence: One story  Living Alone: No  Support Systems: Child(abdiaziz),Caregiver/Home Care Staff    OBJECTIVE:     PAIN: VITAL SIGNS: LINES/DRAINS:   Pre Treatment: Pain Screen  Pain Scale 1: Numeric (0 - 10)  Pain Intensity 1: 0  Post Treatment: 0   N/A  O2 Device: Nasal cannula     ACTIVITIES OF DAILY LIVING: I Mod I S SBA CGA Min Mod Max Total NT Comments   BASIC ADLs:              Bathing/ Showering [] [] [] [] [] [] [] [] [] [x]    Toileting [] [] [] [] [] [] [x] [] [] []    Dressing [] [] [] [] [] [] [] [] [] [x]    Feeding [] [] [] [] [] [] [] [] [] [x]    Grooming [] [] [] [] [] [x] [] [] [] []    Personal Device Care [] [] [] [] [] [] [] [] [] [x]    Functional Mobility [] [] [] [] [] [] [] [x] [] [] x2   I=Independent, Mod I=Modified Independent, S=Supervision, SBA=Standby Assistance, CGA=Contact Guard Assistance,   Min=Minimal Assistance, Mod=Moderate Assistance, Max=Maximal Assistance, Total=Total Assistance, NT=Not Tested    MOBILITY: I Mod I S SBA CGA Min Mod Max Total  NT x2 Comments:   Supine to sit [] [] [] [] [] [] [] [x] [] [] [x]    Sit to supine [] [] [] [] [] [] [] [] [] [x] []    Sit to stand [] [] [] [] [] [] [] [x] [] [] [x]    Bed to chair [] [] [] [] [] [] [] [x] [] [] [x]    I=Independent, Mod I=Modified Independent, S=Supervision, SBA=Standby Assistance, CGA=Contact Guard Assistance,   Min=Minimal Assistance, Mod=Moderate Assistance, Max=Maximal Assistance, Total=Total Assistance, NT=Not Tested    BALANCE: Good Fair+ Fair Fair- Poor NT Comments   Sitting Static [] [] [x] [] [] []    Sitting Dynamic [] [] [x] [] [] []              Standing Static [] [] [] [x] [] []    Standing Dynamic [] [] [] [] [x] []      PLAN:   FREQUENCY/DURATION: OT Plan of Care: 5 times/week for duration of hospital stay or until stated goals are met, whichever comes first.    TREATMENT:   TREATMENT:   ($$ Self Care/Home Management: 8-22 mins$$ Neuromuscular Re-Education: 8-22 mins   )  Co-Treatment PT/OT necessary due to patient's decreased overall endurance/tolerance levels, as well as need for high level skilled assistance to complete functional transfers/mobility and functional tasks  Self Care (15 Minutes): Self care including Toileting and Grooming to increase independence and decrease level of assistance required. Neuromuscular Re-education (23 Minutes): Neuromuscular Re-education included Balance Training, Coordination training, Postural training, Sitting balance training and Standing balance training to improve Balance, Coordination, Functional Mobility and Postural Control.     TREATMENT GRID:  N/A    AFTER TREATMENT POSITION/PRECAUTIONS:  Alarm Activated, Chair, Needs within reach and RN notified    INTERDISCIPLINARY COLLABORATION:  RN/PCT, PT/PTA and OT/GUTIERRES    TOTAL TREATMENT DURATION:  OT Patient Time In/Time Out  Time In: 1007  Time Out: [address] [facility] [place]

## 2022-02-27 NOTE — PROGRESS NOTES
Problem: Falls - Risk of  Goal: *Absence of Falls  Description: Document Mekhi Sanchezli Fall Risk and appropriate interventions in the flowsheet. Outcome: Progressing Towards Goal  Note: Fall Risk Interventions:  Mobility Interventions: Bed/chair exit alarm    Mentation Interventions: Adequate sleep, hydration, pain control,Bed/chair exit alarm,Door open when patient unattended    Medication Interventions: Patient to call before getting OOB,Teach patient to arise slowly    Elimination Interventions: Bed/chair exit alarm,Call light in reach    History of Falls Interventions: Door open when patient unattended         Problem: Pressure Injury - Risk of  Goal: *Prevention of pressure injury  Description: Document Paul Scale and appropriate interventions in the flowsheet.   Outcome: Progressing Towards Goal  Note: Pressure Injury Interventions:  Sensory Interventions: Assess changes in LOC    Moisture Interventions: Absorbent underpads    Activity Interventions: PT/OT evaluation,Pressure redistribution bed/mattress(bed type)    Mobility Interventions: Assess need for specialty bed,HOB 30 degrees or less,Pressure redistribution bed/mattress (bed type)    Nutrition Interventions: Document food/fluid/supplement intake    Friction and Shear Interventions: HOB 30 degrees or less

## 2022-02-28 VITALS
SYSTOLIC BLOOD PRESSURE: 133 MMHG | HEART RATE: 95 BPM | HEIGHT: 61 IN | WEIGHT: 146 LBS | BODY MASS INDEX: 27.56 KG/M2 | DIASTOLIC BLOOD PRESSURE: 51 MMHG | RESPIRATION RATE: 17 BRPM | OXYGEN SATURATION: 96 % | TEMPERATURE: 97.8 F

## 2022-02-28 LAB
ANION GAP SERPL CALC-SCNC: 7 MMOL/L (ref 7–16)
BASOPHILS # BLD: 0 K/UL (ref 0–0.2)
BASOPHILS NFR BLD: 1 % (ref 0–2)
BUN SERPL-MCNC: 21 MG/DL (ref 8–23)
CALCIUM SERPL-MCNC: 8.2 MG/DL (ref 8.3–10.4)
CHLORIDE SERPL-SCNC: 107 MMOL/L (ref 98–107)
CO2 SERPL-SCNC: 24 MMOL/L (ref 21–32)
CREAT SERPL-MCNC: 1 MG/DL (ref 0.6–1)
DIFFERENTIAL METHOD BLD: ABNORMAL
EOSINOPHIL # BLD: 0.1 K/UL (ref 0–0.8)
EOSINOPHIL NFR BLD: 2 % (ref 0.5–7.8)
ERYTHROCYTE [DISTWIDTH] IN BLOOD BY AUTOMATED COUNT: 14.8 % (ref 11.9–14.6)
GLUCOSE BLD STRIP.AUTO-MCNC: 210 MG/DL (ref 65–100)
GLUCOSE BLD STRIP.AUTO-MCNC: 95 MG/DL (ref 65–100)
GLUCOSE SERPL-MCNC: 95 MG/DL (ref 65–100)
HCT VFR BLD AUTO: 25.2 % (ref 35.8–46.3)
HGB BLD-MCNC: 8 G/DL (ref 11.7–15.4)
IMM GRANULOCYTES # BLD AUTO: 0 K/UL (ref 0–0.5)
IMM GRANULOCYTES NFR BLD AUTO: 1 % (ref 0–5)
LYMPHOCYTES # BLD: 1.1 K/UL (ref 0.5–4.6)
LYMPHOCYTES NFR BLD: 18 % (ref 13–44)
MCH RBC QN AUTO: 29.5 PG (ref 26.1–32.9)
MCHC RBC AUTO-ENTMCNC: 31.7 G/DL (ref 31.4–35)
MCV RBC AUTO: 93 FL (ref 79.6–97.8)
MONOCYTES # BLD: 0.6 K/UL (ref 0.1–1.3)
MONOCYTES NFR BLD: 11 % (ref 4–12)
NEUTS SEG # BLD: 4 K/UL (ref 1.7–8.2)
NEUTS SEG NFR BLD: 68 % (ref 43–78)
NRBC # BLD: 0 K/UL (ref 0–0.2)
PLATELET # BLD AUTO: 196 K/UL (ref 150–450)
PMV BLD AUTO: 12.3 FL (ref 9.4–12.3)
POTASSIUM SERPL-SCNC: 3.9 MMOL/L (ref 3.5–5.1)
RBC # BLD AUTO: 2.71 M/UL (ref 4.05–5.2)
SERVICE CMNT-IMP: ABNORMAL
SERVICE CMNT-IMP: NORMAL
SODIUM SERPL-SCNC: 138 MMOL/L (ref 136–145)
WBC # BLD AUTO: 5.9 K/UL (ref 4.3–11.1)

## 2022-02-28 PROCEDURE — 82962 GLUCOSE BLOOD TEST: CPT

## 2022-02-28 PROCEDURE — 85025 COMPLETE CBC W/AUTO DIFF WBC: CPT

## 2022-02-28 PROCEDURE — 80048 BASIC METABOLIC PNL TOTAL CA: CPT

## 2022-02-28 PROCEDURE — 74011250637 HC RX REV CODE- 250/637: Performed by: ORTHOPAEDIC SURGERY

## 2022-02-28 PROCEDURE — 97110 THERAPEUTIC EXERCISES: CPT

## 2022-02-28 PROCEDURE — 74011250637 HC RX REV CODE- 250/637: Performed by: INTERNAL MEDICINE

## 2022-02-28 PROCEDURE — 74011000250 HC RX REV CODE- 250: Performed by: ORTHOPAEDIC SURGERY

## 2022-02-28 PROCEDURE — 36415 COLL VENOUS BLD VENIPUNCTURE: CPT

## 2022-02-28 RX ADMIN — GABAPENTIN 100 MG: 100 CAPSULE ORAL at 09:53

## 2022-02-28 RX ADMIN — CETIRIZINE HYDROCHLORIDE 5 MG: 5 TABLET ORAL at 09:45

## 2022-02-28 RX ADMIN — Medication 1 TABLET: at 09:51

## 2022-02-28 RX ADMIN — APIXABAN 2.5 MG: 2.5 TABLET, FILM COATED ORAL at 09:40

## 2022-02-28 RX ADMIN — SODIUM CHLORIDE, PRESERVATIVE FREE 10 ML: 5 INJECTION INTRAVENOUS at 05:45

## 2022-02-28 RX ADMIN — FENOFIBRATE 160 MG: 160 TABLET ORAL at 09:53

## 2022-02-28 NOTE — PROGRESS NOTES
ACUTE OT GOALS:  (Developed with and agreed upon by patient and/or caregiver.)  1. Complete functional transfers (I.e. toilet) with moderate assistance or less. 2. Complete upper body bathing and dressing with standby assistance. 3. Complete B UE strengthening 15-20 minutes in preparation for therapeutic activities/ADL. 4. Complete grooming/oral care with standby assistance. Time frame: 4 - 7 visits. OCCUPATIONAL THERAPY: Daily Note OT Treatment Day # 3    Kathleen Mccain is a 81 y.o. female   PRIMARY DIAGNOSIS: Femur fracture, left (HCC)  Femur fracture, left (HCC) [S72.92XA]  CHAVA (acute kidney injury) ([facility] [address] [N17.9]  Procedure(s) (LRB):  LEFT HIP HEMIARTHROPLASTY CHOICE TO BE ADMITTED (Left)  3 Days Post-Op  [place] / Product Type: Medicare /   ASSESSMENT:     REHAB RECOMMENDATIONS: CURRENT LEVEL OF FUNCTION:  (Most Recently Demonstrated)   Recommendation to date pending progress:  Setting:   Short-term Rehab  Equipment:    To Be Determined Bathing:   Not tested  Dressing:   Not tested  Feeding/Grooming:   Not tested    Toileting:   Not tested  Functional Mobility:   Not tested     ASSESSMENT:  Ms. Alcira Gill is doing better today. Pt presents sitting up in chair. Pt instructed in UE exercises to increase strength and activity tolerance. Pt did well with exercises. Did well with following commands. Pt left with all needs in reach. Pt is to be discharged to SNF today.      SUBJECTIVE:   Ms. Alcira Gill states, \"I am sorry for being mean\"    SOCIAL HISTORY/LIVING ENVIRONMENT:   Home Environment: Skilled nursing facility  # Steps to Enter: 0  One/Two Story Residence: One story  Living Alone: No  Support Systems: Child(abdiaziz),Caregiver/Home Care Staff    OBJECTIVE:     PAIN: VITAL SIGNS: LINES/DRAINS:   Pre Treatment: Pain Screen  Pain Scale 1: Numeric (0 - 10)  Pain Intensity 1: 0  Post Treatment: 0   N/A  O2 Device: None (Room air)     ACTIVITIES OF DAILY LIVING: I Mod I S SBA CGA Min Mod Max Total NT Comments   BASIC ADLs:              Bathing/ Showering [] [] [] [] [] [] [] [] [] [x]    Toileting [] [] [] [] [] [] [] [] [] [x]    Dressing [] [] [] [] [] [] [] [] [] [x]    Feeding [] [] [] [] [] [] [] [] [] [x]    Grooming [] [] [] [] [] [] [] [] [] [x]    Personal Device Care [] [] [] [] [] [] [] [] [] [x]    Functional Mobility [] [] [] [] [] [] [] [] [] [x]    I=Independent, Mod I=Modified Independent, S=Supervision, SBA=Standby Assistance, CGA=Contact Guard Assistance,   Min=Minimal Assistance, Mod=Moderate Assistance, Max=Maximal Assistance, Total=Total Assistance, NT=Not Tested    MOBILITY: I Mod I S SBA CGA Min Mod Max Total  NT x2 Comments:   Supine to sit [] [] [] [] [] [] [] [] [] [] [x]    Sit to supine [] [] [] [] [] [] [] [] [] [] [x]    Sit to stand [] [] [] [] [] [] [] [] [] [] [x]    Bed to chair [] [] [] [] [] [] [] [] [] [] [x]    I=Independent, Mod I=Modified Independent, S=Supervision, SBA=Standby Assistance, CGA=Contact Guard Assistance,   Min=Minimal Assistance, Mod=Moderate Assistance, Max=Maximal Assistance, Total=Total Assistance, NT=Not Tested    BALANCE: Good Fair+ Fair Fair- Poor NT Comments   Sitting Static [] [] [x] [] [] []    Sitting Dynamic [] [] [x] [] [] []              Standing Static [] [] [] [] [] [x]    Standing Dynamic [] [] [] [] [] [x]      PLAN:   FREQUENCY/DURATION: OT Plan of Care: 5 times/week for duration of hospital stay or until stated goals are met, whichever comes first.    TREATMENT:   TREATMENT:   ( $$ Therapeutic Exercises: 8-22 mins   )  Therapeutic Exercise (15 Minutes): Therapeutic exercises noted below to improve functional activity tolerance, strength and mobility.      TREATMENT GRID:   Date:  2/28/22 Date:   Date:     Activity/Exercise Parameters Parameters Parameters   Shoulder Abd/Adduction 10 reps     Shoulder Flexion (with 2# dowel) 10 reps     Elbow Flexion 10 reps     Punches 10 reps                             AFTER TREATMENT POSITION/PRECAUTIONS:  Alarm Activated, Chair, Needs within reach and RN notified    INTERDISCIPLINARY COLLABORATION:  RN/PCT and OT/GUTIERRES    TOTAL TREATMENT DURATION:  OT Patient Time In/Time Out  Time In: 1020  Time Out: [address], [place]

## 2022-02-28 NOTE — PROGRESS NOTES
Problem: Falls - Risk of  Goal: *Absence of Falls  Description: Document Mekhi Tabitha Fall Risk and appropriate interventions in the flowsheet. Outcome: Progressing Towards Goal  Note: Fall Risk Interventions:  Mobility Interventions: Bed/chair exit alarm,Communicate number of staff needed for ambulation/transfer    Mentation Interventions: Bed/chair exit alarm,Door open when patient unattended    Medication Interventions: Bed/chair exit alarm    Elimination Interventions: Bed/chair exit alarm,Toileting schedule/hourly rounds    History of Falls Interventions: Door open when patient unattended,Bed/chair exit alarm         Problem: Pressure Injury - Risk of  Goal: *Prevention of pressure injury  Description: Document Paul Scale and appropriate interventions in the flowsheet.   Outcome: Progressing Towards Goal  Note: Pressure Injury Interventions:  Sensory Interventions: Assess changes in LOC    Moisture Interventions: Absorbent underpads,Internal/External urinary devices,Check for incontinence Q2 hours and as needed    Activity Interventions: Pressure redistribution bed/mattress(bed type),Increase time out of bed    Mobility Interventions: Pressure redistribution bed/mattress (bed type),HOB 30 degrees or less    Nutrition Interventions: Document food/fluid/supplement intake    Friction and Shear Interventions: Foam dressings/transparent film/skin sealants,HOB 30 degrees or less,Minimize layers

## 2022-02-28 NOTE — PROGRESS NOTES
Patient discharge plan reviewed in IDT rounds. Patient medically ready for discharge. Patient will discharge via [address] transport at [phone] to room 303 at [facility]. Primary RN will call report to [phone]. Care Management Interventions  PCP Verified by CM: Yes (Dr Heather Siddiqi last visit 3 weeks ago per pt)  Mode of Transport at Discharge: [address]  [address] Time of Discharge: [address] (CM Consult): Discharge [address] [id number] (current resident for LTC at [facility])  Partner SNF: Yes  MyChart Signup: No  Discharge Durable Medical Equipment: No  Physical Therapy Consult: No  Occupational Therapy Consult: No  Speech Therapy Consult: No  Support Systems: Child(abdiaziz),Caregiver/Home Care Staff  Confirm Follow Up Transport: Family  The Plan for Transition of Care is Related to the Following Treatment Goals : Patient will participate in STR therapies in order to return to safe baseline independence in ADLs.   The Patient and/or Patient Representative was Provided with a Choice of Provider and Agrees with the Discharge Plan?: Yes  Name of the Patient Representative Who was Provided with a Choice of Provider and Agrees with the Discharge Plan: son/pt  Freedom of Choice List was Provided with Basic Dialogue that Supports the Patient's Individualized Plan of Care/Goals, Treatment Preferences and Shares the Quality Data Associated with the Providers?: Yes  Discharge Location  Patient Expects to be Discharged to[de-identified] Rehab Unit Subacute

## 2022-02-28 NOTE — PROGRESS NOTES
ACUTE PHYSICAL THERAPY GOALS:  (Developed with and agreed upon by patient and/or caregiver.)  STG:  (1.)Ms. Tae Cannon will move from supine to sit and sit to supine , scoot up and down and roll side to side with MODERATE ASSIST within 4 treatment day(s). (2.)Ms. Tae Cannon will transfer from bed to chair and chair to bed with MODERATE ASSIST using the least restrictive device within 4 treatment day(s). (3.)Ms. Tae Cannon will sit in bedside chair up to 6 hours within 4 treatment day(s).      LTG:  (1.)Ms. Tae Cannon will move from supine to sit and sit to supine , scoot up and down and roll side to side in bed with MINIMAL ASSIST within 7 treatment day(s). (2.)Ms. Tae Cannon will transfer from bed to chair and chair to bed with MINIMAL ASSIST using the least restrictive device within 7 treatment day(s). (3.)Ms. Tae Cannon will sit in bedside chair up to 8 hours within 7 treatment day(s)      PHYSICAL THERAPY: Daily Note and AM Treatment Day # 2   WBAT TRAE Decker is a 81 y.o. female   PRIMARY DIAGNOSIS: Femur fracture, left (HCC)  Femur fracture, left (HCC) [S72.92XA]  CHAVA (acute kidney injury) ([facility] [address] [N17.9]  Procedure(s) (LRB):  LEFT HIP HEMIARTHROPLASTY CHOICE TO BE ADMITTED (Left)  3 Days Post-Op    ASSESSMENT:     REHAB RECOMMENDATIONS: CURRENT LEVEL OF FUNCTION:  (Most Recently Demonstrated)   Recommendation to date pending progress:  Setting:   Short-term Rehab  Equipment:    To Be Determined Bed Mobility:   Not tested  Sit to Stand:   Not tested  Transfers:   Not tested  Gait/Mobility:   Not tested     ASSESSMENT:  Ms. Tae Cannon was sitting up in chair on arrival. She is less confused today. Exercises sitting in chair per chart below. Pt plans to discharged to [facility] today.       SUBJECTIVE:   Ms. Tae Cannon states, \"I'm leaving at 11:30\"    SOCIAL HISTORY/ LIVING ENVIRONMENT: see eval  Home Environment: Skilled nursing facility  # Steps to Enter: 0  One/Two Story Residence: One story  Living Alone: No  Support Systems: Child(abdiaziz),Caregiver/Home Care Staff  OBJECTIVE:     PAIN: VITAL SIGNS: LINES/DRAINS:   Pre Treatment:  not rated  Post Treatment:  Not rated   O2  O2 Device: None (Room air)     MOBILITY: I Mod I S SBA CGA Min Mod Max Total  NT x2 Comments:   Bed Mobility    Rolling [] [] [] [] [] [] [] [] [] [x] []    Supine to Sit [] [] [] [] [] [] [] [] [] [x] []    Scooting [] [] [] [] [] [] [] [] [] [x] []    Sit to Supine [] [] [] [] [] [] [] [] [] [x] []    Transfers    Sit to Stand [] [] [] [] [] [] [] [] [] [x] []    Bed to Chair [] [] [] [] [] [] [] [] [] [x] []    Stand to Sit [] [] [] [] [] [] [] [] [] [x] []    I=Independent, Mod I=Modified Independent, S=Supervision, SBA=Standby Assistance, CGA=Contact Guard Assistance,   Min=Minimal Assistance, Mod=Moderate Assistance, Max=Maximal Assistance, Total=Total Assistance, NT=Not Tested    BALANCE: Good Fair+ Fair Fair- Poor NT Comments   Sitting Static [] [] [] [] [] [x]    Sitting Dynamic [] [] [] [] [] [x]              Standing Static [] [] [] [] [] [x]    Standing Dynamic [] [] [] [] [] [x]      GAIT: I Mod I S SBA CGA Min Mod Max Total  NT x2 Comments:   Level of Assistance [] [] [] [] [] [] [] [] [] [x] []    Distance     DME N/A    Gait Quality     Weightbearing  Status WBAT  LLE     I=Independent, Mod I=Modified Independent, S=Supervision, SBA=Standby Assistance, CGA=Contact Guard Assistance,   Min=Minimal Assistance, Mod=Moderate Assistance, Max=Maximal Assistance, Total=Total Assistance, NT=Not Tested    PLAN:   FREQUENCY/DURATION: PT Plan of Care: BID for duration of hospital stay or until stated goals are met, whichever comes first.  TREATMENT:     TREATMENT:   ($$ Therapeutic Exercises: 8-22 mins    )  Therapeutic Exercise (20 Minutes): Therapeutic exercises noted below to improve functional activity tolerance, AROM, strength and mobility.      TREATMENT GRID:     Date:  02/27/22 Date:  2/28/22 Date:     ACTIVITY/EXERCISE AM PM AM PM AM PM   SAQ 15  15 Ankle pumps 20  15      abduction 15  15      Quad sets   15      Heel slides   15                        B = bilateral; AA = active assistive; A = active; P = passive    AFTER TREATMENT POSITION/PRECAUTIONS:  Alarm Activated, Chair, Needs within reach and RN notified    INTERDISCIPLINARY COLLABORATION:  RN/PCT and PT/PTA    TOTAL TREATMENT DURATION:  PT Patient Time In/Time Out  Time In: 0955  Time Out: [address], [place]

## 2022-02-28 NOTE — PROGRESS NOTES
2022         Post Op day: 3 Days Post-Op Procedure(s) (LRB):  LEFT HIP HEMIARTHROPLASTY CHOICE TO BE ADMITTED (Left)      Admit Date: 2022  Admit Diagnosis: Femur fracture, left (HCC) [S72.92XA]  CHAVA (acute kidney injury) ([facility] [N17.9]       Principle Problem: Femur fracture, left ([facility] [address] Subjective: Doing well, does know why she is in the hospital, No complaints, No SOB, No Chest Pain, No Nausea or Vomiting     Objective:   Vital Signs are Stable, No Acute Distress, Alert,  Dressing is Dry,  Neurovascular exam is normal.     Assessment / Plan :  Patient Active Problem List   Diagnosis Code    Rhabdomyolysis M62.82    Increased lactic acid level R79.89    SIRS (systemic inflammatory response syndrome) ([facility]) R65.10    Diabetes ([facility]) E11.9    Hyperkalemia E87.5    Hypothermia T68. XXXA    High anion gap metabolic acidosis E49.0    Atrial fibrillation with RVR ([facility]) I48.91    Elevated TSH R79.89    Hypomagnesemia E83.42    Obesity E66.9    Essential hypertension with goal blood pressure less than 140/90 I10    UTI (urinary tract infection) N39.0    Scalp hematoma S00. 03XA    Thyroid enlargement E04.9    Femur fracture, left ([facility]) S72. 92XA    CHAVA (acute kidney [facility]) ([facility] N17.9      Patient Vitals for the past 8 hrs:   BP Temp Pulse Resp SpO2   22 0824 (!) 128/94 97.9 °F (36.6 °C) 92 17 94 %   22 0324 (!) 147/69 99 °F (37.2 °C) 90 19 96 %    Temp (24hrs), Av.9 °F (37.2 °C), Min:97.9 °F (36.6 °C), Max:99.8 °F (37.7 °C)    Body mass index is 27.59 kg/m².     Lab Results   Component Value Date/Time    HGB 8.0 (L) 2022 04:37 AM          S/P Procedure(s) (LRB):  LEFT HIP HEMIARTHROPLASTY CHOICE TO BE ADMITTED (Left)    Minimize narcotics      Medical Mgmt per hospitalist  Anticoagulation plan: Eliquis   Continue PT  Fall Precautions  DC disp: rehab placement  F/U: 2 weeks postop for wound check and staple removal      Signed By: SHELLEY Roe  2022, 8:31 AM

## 2022-02-28 NOTE — DISCHARGE SUMMARY
Hospitalist Discharge Summary   Admit Date:  2022 11:08 AM   DC Note date: 2022  Name:  India Solorzano   Age:  81 y.o. Sex:  female  :  1935   MRN:  [id number]   Room:  [facility]  PCP:  Keila Yoder MD    Presenting Complaint: Fall    Initial Admission Diagnosis: Femur fracture, left ([facility] [S72.92XA]  [facility]AVA (acute kidney injury) [facility] [N17.9]     Problem List for this Hospitalization:  Hospital Problems as of 2022 Never Reviewed          Codes Class Noted - Resolved POA    * (Principal) Femur fracture, left ([facility] ICD-10-CM: J44.04NO  ICD-9-CM: 821.00  2022 - Present Unknown        CHAVA (acute kidney injury) ([facility] ICD-10-CM: N17.9  ICD-9-CM: 584.9  2022 - Present Unknown        Thyroid enlargement ICD-10-CM: E04.9  ICD-9-CM: 240.9  2021 - Present Yes        Essential hypertension with goal blood pressure less than 140/90 ICD-10-CM: I10  ICD-9-CM: 401.9  2016 - Present Yes        Diabetes ([facility] ICD-10-CM: E11.9  ICD-9-CM: 250.00  2014 - Present Yes            Did Patient have Sepsis (YES OR NO): no    Hospital Course:  Patient is a 81 y. o. female who presented to the ED for cc left hip pain after a fall when tripping over her wheel chair. Denies syncope or dizziness. Hx of DM type II, HTN, HLD, depression, CKD stage 3, paroxysmal a fib on Eliquis (unsure if her last dose was last night or this AM), and left heel wound. Currently at rehab center for generalized weakness.      Vitals- stable     Labs- Hg 9.5 (was 11 on 21)--denies obvious bleeding, platelets 190, creatine 1.9 from baseline 1.4     Hip x ray with left femoral neck fracture      Assessment and plan    Left femoral neck fracture -patient presented to the emergency department for left femoral neck fracture and was seen in consultation with general surgery and did undergo surgical repair.   Postoperatively patient's pain as well as weightbearing status and anticoagulation was managed by orthopedic surgery. Patient's Eliquis was resumed per recommendations of orthopedic surgery and patient is encouraged to follow-up with [facility] continue PT OT. She was recommended to follow-up with orthopedic surgery on outpatient basis.            Hyperkalemia-patient presented to the hospital with hyperkalemia and resolved with a single dose of Lokelma. Would consider monitoring patient's BMP given she is on ARB which is known to cause hyperkalemia.            Hypotension-resolved during hospitalization likely was secondary to sedating medications and anesthesia. For this reason patient is being discharged without opiates and has not required opiates during hospitalization.        Bradycardia-was transient immediately postoperatively. Patient is on propranolol on an outpatient basis and this was resumed and would recommend continued monitoring of patient's heart rate to ensure appropriate dosing of beta-blocker.        CHAVA -resolved during hospitalization with IV fluids and withholding of nephrotoxic agents. Patient's ARB was resumed at time of discharge for underlying heart failure and is recommended that patient's BMP be monitored for monitoring patient's renal function with reinstitution of nephrotoxic agent        Dm type II -patient noted to have a hemoglobin A1c of 6.9 given the patient's advanced age of 81 would be considered to [facility] would not recommend any treatment at this time.                HLD - fenofibrate/statin       Disposition: Skilled Nursing Facility  Diet: ADULT DIET Regular; 3 carb choices (45 gm/meal)  Code Status: DNR    Follow Up Orders: Follow-up Appointments   Procedures    FOLLOW UP VISIT Appointment in: Two Weeks Staple removal in office. Call  [phone] for appointment     Staple removal in office. Call  [phone] for appointment     Standing Status:   Standing     Number of Occurrences:   1     Order Specific Question:   Appointment in     Answer:    Two Weeks       Follow-up Information     Follow up With Specialties Details Why Contact Info    Juan Antonio Marvin [facility] [address] [facility]   [phone] [id number]  [address] [place] [address], [address] [place] [address]  [phone]            Follow up labs/diagnostics (ultimately defer to outpatient provider):  CBC, BMP    Time spent in patient discharge and coordination 37 minutes. Plan was discussed with patient and daughter via phone. All questions answered. Patient was stable at time of discharge. Instructions given to call a physician or return if any concerns. Discharge Info:   Current Discharge Medication List      CONTINUE these medications which have NOT CHANGED    Details   apixaban (ELIQUIS) 2.5 mg tablet Take 1 Tablet by mouth two (2) times a day. Qty: 60 Tablet, Refills: 0      calcium carbonate (OS-CHELSEY) 500 mg calcium (1,250 mg) tablet Take 1 Tab by mouth daily. loratadine (CLARITIN) 10 mg tablet Take 10 mg by mouth.      pravastatin (PRAVACHOL) 80 mg tablet Take 80 mg by mouth nightly. losartan (COZAAR) 25 mg tablet Take 1 Tab by mouth daily. Indications: HYPERTENSION WITH LEFT VENTRICULAR HYPERTROPHY  Qty: 30 Tab, Refills: 11      acetaminophen (TYLENOL) 325 mg tablet Take 2 tablets by mouth every four (4) hours as needed. Qty: 30 tablet, Refills: 0      Venlafaxine 150 mg tr24 Take 1 tablet by mouth daily. sertraline (ZOLOFT) 100 mg tablet Take 200 mg by mouth daily. latanoprost (XALATAN) 0.005 % ophthalmic solution Administer 1 drop to both eyes nightly. propranolol (INDERAL) 20 mg tablet Take 20 mg by mouth two (2) times a day. Cholecalciferol, Vitamin D3, 50,000 unit cap Take  by mouth every seven (7) days. fenofibrate (TRICOR) 160 mg tablet Take 160 mg by mouth daily. gabapentin (NEURONTIN) 100 mg capsule Take 100 mg by mouth three (3) times daily. Procedures done this admission:  Procedure(s):  LEFT HIP HEMIARTHROPLASTY CHOICE TO BE ADMITTED    Consults this admission:  None    Echocardiogram/EKG results:  Results from Hospital Encounter encounter on 02/23/22    ECHO ADULT COMPLETE    Interpretation Summary    Left Ventricle: Left ventricle size is normal. Mildly increased wall thickness. Findings consistent with mild concentric hypertrophy. Normal wall motion. Normal left ventricular systolic function with a visually estimated EF of 55 - 60%. Normal diastolic function.   Mitral Valve: Mild transvalvular regurgitation with a centrally directed jet.   Tricuspid Valve: Mild to moderate transvalvular regurgitation. Moderately elevated RVSP. RVSP is 57 mmHg.   Left Atrium: Left atrium is mildly dilated.   Contrast used: Definity. EKG Results     Procedure 720 Value Units Date/Time    EKG, 12 LEAD, INITIAL [[id number]]     Order Status: Completed           Diagnostic Imaging/Tests:   XR CHEST SNGL V    Result Date: 2/23/2022  Exam: XR CHEST SNGL V on 2/23/2022 11:47 AM Clinical History: The Female patient is 81years old  presenting for pain following fall. Comparison:  Chest x-ray 11/19/2014 Findings:  Frontal view of the chest was obtained. Shallow inspiration is demonstrated. There is scarring at the left lung base. No pleural effusions are demonstrated. The cardiomediastinal silhouette is within normal limits. There are no acute osseous abnormalities. 1. No acute cardiopulmonary process with shallow inspiratory changes. CPT code(s) 28641     XR HIP LT W OR WO PELV 2-3 VWS    Result Date: 2/23/2022  Exam: XR HIP LT W OR WO PELV 2-3 VWS on 2/23/2022 11:51 AM Clinical History: The Female patient is 81years old  presenting for fall out of wheelchair around 0300. Alert and oriented at baseline. Reports left hip pain, xray from facility shows left femoral neck fracture with lateral displacement. PMS intact.  Reports hitting her head when she fell. Comparison:  none Findings: 3 views of the pelvis and left hip were obtained. A transcervical fracture of the left femoral neck is demonstrated with moderate varus angulation at the fracture site. There is otherwise mild superior acetabular joint space loss bilaterally. 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 1. Left femoral neck fracture. CT HEAD WO CONT    Result Date: 2/23/2022  Exam: CT HEAD WO CONT on 2/23/2022 12:09 PM Clinical History: The Female patient is 81years old  presenting for pain following a trauma. Technique: Thin slice axial CT images through the brain were obtained. All CT scans at this facility are performed using dose reduction/dose modulation techniques, as appropriate the performed exam, including the following: Automated Exposure Control; Adjustment of the mA and/or kV according to patient size (this includes techniques or standardized protocols for targeted exams where dose is matched to indication/reason for exam); and Use of Iterative Reconstruction Technique. Radiation Exposure Indices: Reference Air [facility] = 1380 mGy-cm Comparison:  Head CT 12/12/2021. Findings:  Cerebrum: Age-related senescent changes are seen with sulcal and ventricular prominence. . There are scattered chronic lacunae. Densley calcified left supraclinoid carotid artery. No evidence of intracranial hemorrhage, mass, or other space-occupying lesion is seen. There are no abnormal extra-axial fluid collections. Cerebellum: Involutional changes. CSF spaces: The ventricular system is within normal limits. The basilar cisterns are unremarkable. Brainstem: No evidence of ischemia, hemorrhage, or mass. Extracranial tissues: Visualized orbits and extracranial soft tissues are unremarkable. Paranasal sinuses/Mastoids: Well-pneumatized and aerated. . Calvarium: No acute osseous abnormality. 1.  No acute intracranial abnormality.  CPT code 34547     CT SPINE CERV WO CONT    Result Date: 2/23/2022  Exam: CT SPINE CERV WO CONT on 2/23/2022 12:13 PM Clinical History: The Female patient is 81years old  presenting for pain following fall. Technique: Thin section axial CT images were obtained through the entire cervical spine. To optimally assess the base of the dens and C2 vertebra, coronal multiplanar reformatted images were created. To optimally assess vertebral alignment and prevertebral soft tissues as well as spinous processes and posterior elements, sagittal multiplanar reformatted images were created from the original axial data. The axial and reformatted data was available for review. All CT scans at this facility are performed using dose reduction/dose modulation techniques, as appropriate the performed exam, including the following: Automated Exposure Control; Adjustment of the mA and/or kV according to patient size (this includes techniques or standardized protocols for targeted exams where dose is matched to indication/reason for exam); and Use of Iterative Reconstruction Technique. Total radiation dose 1380 mGy-cm. Comparison: None. FINDINGS: The craniocervical junction is unremarkable. Normal curvature and alignment is maintained throughout the cervical spine. There is no significant vertebral body height loss. Chronic degenerative disc atelectasis at multiple levels with ventral minimal dorsal endplate spondylosis most pronounced from C5/6 through C6/7. Associated facet arthropathy is seen. There is asymmetric enlargement of the right thyroid lobe with intrathoracic extension measuring at least 6.3-3.5 cm. . Please note that cord or ligamentous injury cannot be excluded on the basis of this exam.     1. Chronic degenerative changes without acute abnormality. 2. Asymmetrically enlarged right thyroid lobe with intrathoracic extension.  CPT code(s) [id number]     ECHO ADULT COMPLETE    Result Date: 2/24/2022    Left Ventricle: Left ventricle size is normal. Mildly increased wall thickness. Findings consistent with mild concentric hypertrophy. Normal wall motion. Normal left ventricular systolic function with a visually estimated EF of 55 - 60%. Normal diastolic function.   Mitral Valve: Mild transvalvular regurgitation with a centrally directed jet.   Tricuspid Valve: Mild to moderate transvalvular regurgitation. Moderately elevated RVSP. RVSP is 57 mmHg.   Left Atrium: Left atrium is mildly dilated.   Contrast used: Definity. All Micro Results     Procedure Component Value Units Date/Time    COVID-19 RAPID TEST [[id number]]  (Abnormal) Collected: 02/23/22 1852    Order Status: Completed Specimen: Nasopharyngeal Updated: 02/23/22 1934     Specimen source NASAL        COVID-19 rapid test Detected        Comment:      The specimen is POSITIVE for SARS-CoV-2, the novel coronavirus associated with COVID-19. This test has been authorized by the FDA under an Emergency Use Authorization (EUA) for use by authorized laboratories.         Fact sheet for Healthcare Providers: ConventionUpdate.co.nz  Fact sheet for Patients: ConventionUpdate.co.nz       Methodology: Isothermal Nucleic Acid Amplification  RESULTS VERIFIED, PHONED TO AND READ BACK BY  SANDRA MUNOZ AT 1934 2.23.22 EOR               Labs: Results:       BMP, Mg, Phos Recent Labs     02/28/22 0437 02/27/22 0450 02/26/22 0427    134* 130*   K 3.9 4.2 4.1    104 98   CO2 24 21 26   AGAP 7 9 6*   BUN 21 21 23   CREA 1.00 1.10* 1.30*   CA 8.2* 7.8* 7.5*   GLU 95 79 108*      CBC Recent Labs     02/28/22 0437 02/27/22 0450 02/26/22 0427   WBC 5.9 6.4 6.5   RBC 2.71* 2.95* 3.29*   HGB 8.0* 8.7* 9.7*   HCT 25.2* 28.3* 30.7*    91* 117*   GRANS 68 70 67   LYMPH 18 15 20   EOS 2 2 2   MONOS 11 12 10   BASOS 1 1 1   IG 1 1 1   ANEU 4.0 4.4 4.4   ABL 1.1 1.0 1.3   ARPIT 0.1 0.1 0.1   ABM 0.6 0.8 0.6   ABB 0.0 0.0 0.0   AIG 0.0 0.0 0.0      LFT No results for input(s): ALT, TBIL, AP, TP, ALB, GLOB, AGRAT in the last 72 hours.     No lab exists for component: SGOT, GPT   Cardiac Testing Lab Results   Component Value Date/Time    BNP 97 11/13/2014 02:40 PM     03/23/2016 06:16 PM    CK 23 12/01/2014 05:50 AM    CK 44 11/24/2014 07:20 AM    CK 44 11/24/2014 07:20 AM    CK - MB 1.3 11/24/2014 07:20 AM    CK - MB 1.7 11/24/2014 07:20 AM    CK - MB 1.7 11/23/2014 07:12 PM    CK-MB Index 3.0 (H) 11/24/2014 07:20 AM    CK-MB Index 3.9 (H) 11/24/2014 07:20 AM    CK-MB Index 4.7 (H) 11/23/2014 07:12 PM    Troponin-I, Qt. <0.02 (L) 03/22/2016 01:56 PM    Troponin-I, Qt. <0.02 (L) 11/24/2014 07:20 AM    Troponin-I, Qt. <0.02 (L) 11/24/2014 07:20 AM      Coagulation Tests Lab Results   Component Value Date/Time    Prothrombin time 10.7 11/17/2014 01:12 PM    Prothrombin time 12.6 (H) 11/13/2014 02:40 PM    INR 1.0 11/17/2014 01:12 PM    INR 1.2 11/13/2014 02:40 PM    aPTT 28.9 11/13/2014 02:40 PM      A1c Lab Results   Component Value Date/Time    Hemoglobin A1c 7.4 (H) 02/25/2022 05:03 AM    Hemoglobin A1c 6.9 (H) 12/14/2021 09:55 AM    Hemoglobin A1c 6.0 11/19/2014 07:30 PM      Lipid Panel No results found for: CHOL, CHOLPOCT, CHOLX, CHLST, CHOLV, 512402, HDL, HDLP, LDL, LDLC, DLDLP, 379401, VLDLC, VLDL, TGLX, TRIGL, TRIGP, TGLPOCT, CHHD, CHHDX   Thyroid Panel Lab Results   Component Value Date/Time    TSH 5.450 (H) 08/18/2021 12:18 AM    TSH 3.560 03/23/2016 06:16 PM    T4, Total 6.7 11/19/2014 07:30 PM        Most Recent UA Lab Results   Component Value Date/Time    Color YELLOW 03/23/2016 08:10 AM    Appearance CLEAR 03/23/2016 08:10 AM    Specific gravity 1.010 03/23/2016 08:10 AM    pH (UA) 5.0 03/23/2016 08:10 AM    Protein NEGATIVE  03/23/2016 08:10 AM    Glucose NEGATIVE  03/23/2016 08:10 AM    Ketone NEGATIVE  03/23/2016 08:10 AM    Bilirubin NEGATIVE  03/23/2016 08:10 AM    Blood NEGATIVE  03/23/2016 08:10 AM    Urobilinogen 0.2 03/23/2016 08:10 AM    Nitrites NEGATIVE  03/23/2016 08:10 AM    Leukocyte Esterase TRACE (A) 03/23/2016 08:10 AM    WBC >100 (H) 12/12/2021 11:27 PM    RBC 3-5 12/12/2021 11:27 PM    Epithelial cells 0 12/12/2021 11:27 PM    Bacteria 4+ (H) 12/12/2021 11:27 PM    Casts 10-20 12/12/2021 11:27 PM          All Labs from Last 24 Hrs:  Recent Results (from the past 24 hour(s))   GLUCOSE, POC    Collection Time: 02/27/22 11:20 AM   Result Value Ref Range    Glucose (POC) 84 65 - 100 mg/dL    [facility]    GLUCOSE, POC    Collection Time: 02/27/22  4:22 PM   Result Value Ref Range    Glucose (POC) 103 (H) 65 - 100 mg/dL    Performed by [facility]    GLUCOSE, POC    Collection Time: 02/27/22  9:33 PM   Result Value Ref Range    Glucose (POC) 122 (H) 65 - 100 mg/dL    Performed by [facility]    CBC WITH AUTOMATED DIFF    Collection Time: 02/28/22  4:37 AM   Result Value Ref Range    WBC 5.9 4.3 - 11.1 K/uL    RBC 2.71 (L) 4.05 - 5.2 M/uL    HGB 8.0 (L) 11.7 - 15.4 g/dL    HCT 25.2 (L) 35.8 - 46.3 %    MCV 93.0 79.6 - 97.8 FL    MCH 29.5 26.1 - 32.9 PG    MCHC 31.7 31.4 - 35.0 g/dL    RDW 14.8 (H) 11.9 - 14.6 %    PLATELET 236 506 - 745 K/uL    MPV 12.3 9.4 - 12.3 FL    ABSOLUTE NRBC 0.00 0.0 - 0.2 K/uL    DF AUTOMATED      NEUTROPHILS 68 43 - 78 %    LYMPHOCYTES 18 13 - 44 %    MONOCYTES 11 4.0 - 12.0 %    EOSINOPHILS 2 0.5 - 7.8 %    BASOPHILS 1 0.0 - 2.0 %    IMMATURE GRANULOCYTES 1 0.0 - 5.0 %    ABS. NEUTROPHILS 4.0 1.7 - 8.2 K/UL    ABS. LYMPHOCYTES 1.1 0.5 - 4.6 K/UL    ABS. MONOCYTES 0.6 0.1 - 1.3 K/UL    ABS. EOSINOPHILS 0.1 0.0 - 0.8 K/UL    ABS. BASOPHILS 0.0 0.0 - 0.2 K/UL    ABS. IMM.  GRANS. 0.0 0.0 - 0.5 K/UL   METABOLIC PANEL, BASIC    Collection Time: 02/28/22  4:37 AM   Result Value Ref Range    Sodium 138 136 - 145 mmol/L    Potassium 3.9 3.5 - 5.1 mmol/L    Chloride 107 98 - 107 mmol/L    CO2 24 21 - 32 mmol/L    Anion gap 7 7 - 16 mmol/L    Glucose 95 65 - 100 mg/dL    BUN 21 8 - 23 MG/DL    Creatinine 1.00 0.6 - 1.0 MG/DL    GFR est AA >60 >60 ml/min/1.73m2    GFR est non-AA 56 (L) >60 ml/min/1.73m2    Calcium 8.2 (L) 8.3 - 10.4 MG/DL   GLUCOSE, POC    Collection Time: 02/28/22  6:39 AM   Result Value Ref Range    Glucose (POC) 95 65 - 100 mg/dL    Performed by [address] [place] [address] in Hospital:   Current Facility-Administered Medications   Medication Dose Route Frequency    zinc oxide-cod liver oil (DESITIN) 40 % paste   Topical PRN    apixaban (ELIQUIS) tablet 2.5 mg  2.5 mg Oral BID    sodium chloride (NS) flush 5-40 mL  5-40 mL IntraVENous Q8H    sodium chloride (NS) flush 5-40 mL  5-40 mL IntraVENous PRN    alum-mag hydroxide-simeth (MYLANTA) oral suspension 30 mL  30 mL Oral Q4H PRN    calcium-vitamin D (OS-CHELSEY +D3) 500 mg-200 unit per tablet 1 Tablet  1 Tablet Oral TID WITH MEALS    dextrose 40% (GLUTOSE) oral gel 1 Tube  15 g Oral PRN    glucagon (GLUCAGEN) injection 1 mg  1 mg IntraMUSCular PRN    dextrose 10% infusion 125-250 mL  125-250 mL IntraVENous PRN    fenofibrate (LOFIBRA) tablet 160 mg  160 mg Oral DAILY    gabapentin (NEURONTIN) capsule 100 mg  100 mg Oral TID    latanoprost (XALATAN) 0.005 % ophthalmic solution 1 Drop  1 Drop Both Eyes QHS    cetirizine (ZYRTEC) tablet 5 mg  5 mg Oral DAILY    pravastatin (PRAVACHOL) tablet 80 mg  80 mg Oral QHS    [Held by provider] propranoloL (INDERAL) tablet 20 mg  20 mg Oral BID    sodium chloride (NS) flush 5-40 mL  5-40 mL IntraVENous Q8H    sodium chloride (NS) flush 5-40 mL  5-40 mL IntraVENous PRN    acetaminophen (TYLENOL) tablet 650 mg  650 mg Oral Q6H PRN    Or    acetaminophen (TYLENOL) suppository 650 mg  650 mg Rectal Q6H PRN    polyethylene glycol (MIRALAX) packet 17 g  17 g Oral DAILY PRN    ondansetron (ZOFRAN ODT) tablet 4 mg  4 mg Oral Q8H PRN    Or    ondansetron (ZOFRAN) injection 4 mg  4 mg IntraVENous Q6H PRN    insulin lispro (HUMALOG) injection   SubCUTAneous AC&HS       Allergies   Allergen Reactions    Ciprocinonide Unknown (comments)     Cant remember    Citalopram Other (comments)     Cant remember    Pioglitazone Other (comments)     Cant remember    Celecoxib Nausea and Vomiting    Codeine Nausea and Vomiting    Lisinopril Cough    Metronidazole Nausea and Vomiting     Immunization History   Administered Date(s) Administered    COVID-19, Pfizer Purple top, DILUTE for use, 12+ yrs, 30mcg/0.3mL dose 02/12/2021    TB Skin Test (PPD) Intradermal 11/14/2014, 12/13/2021, 02/23/2022    Tdap 05/31/2015, 11/15/2021       Recent Vital Data:  Patient Vitals for the past 24 hrs:   Temp Pulse Resp BP SpO2   02/28/22 1104 97.8 °F (36.6 °C) 95 17 (!) 133/51 96 %   02/28/22 0824 97.9 °F (36.6 °C) 92 17 (!) 128/94 94 %   02/28/22 0324 99 °F (37.2 °C) 90 19 (!) 147/69 96 %   02/27/22 2336 99.4 °F (37.4 °C) 91 19 (!) 115/50 92 %   02/27/22 1939 99.8 °F (37.7 °C) 93 18 (!) 145/53 98 %   02/27/22 1622 99.1 °F (37.3 °C) 89 18 (!) 131/53 98 %   02/27/22 1514 98.3 °F (36.8 °C) 88 19 (!) 117/50 94 %   02/27/22 1513 98.3 °F (36.8 °C) 90 19 (!) 191/157 93 %   02/27/22 1119 99.2 °F (37.3 °C) 81 17 (!) 82/64 100 %     Oxygen Therapy  O2 Sat (%): 96 % (02/28/22 1104)  Pulse via Oximetry: 68 beats per minute (02/26/22 1631)  O2 Device: None (Room air) (02/28/22 0741)  Skin Assessment: Clean, dry, & intact (02/24/22 0720)  Skin Protection for O2 Device: No (02/24/22 0720)  O2 Flow Rate (L/min): 2 l/min (02/27/22 1310)    Estimated body mass index is 27.59 kg/m² as calculated from the following:    Height as of this encounter: 5' 1\" (1.549 m). Weight as of this encounter: 66.2 kg (146 lb). No intake or output data in the 24 hours ending 02/28/22 1108      Physical Exam:    General:    Well nourished. No overt distress  Head:  Normocephalic, atraumatic  Eyes:  Sclerae appear normal.  Pupils equally round. HENT:  Nares appear normal, no drainage. Moist mucous membranes  Neck:  No restricted ROM. Trachea midline  CV:   RRR. No m/r/g. No JVD  Lungs:   CTAB. No wheezing, rhonchi, or rales. Even, unlabored  Abdomen:   Soft, nontender, nondistended. Extremities: Warm and dry. No cyanosis or clubbing. No edema. Skin:     No rashes. Normal coloration  Neuro:  CN II-XII grossly intact. Patient with underlying dementia and appears to be at base  Psych:  Normal mood and affect. Signed:  Barbara Alcala DO    Part of this note may have been written by using a voice dictation software. The note has been proof read but may still contain some grammatical/other typographical errors.

## 2022-02-28 NOTE — PROGRESS NOTES
IV's removed and personal belongings packed. Report called to Austin Vann RN at [facility] for Pt retuning to room 303. Report given in SBAR format with all questions answered. Awaiting transport that is scheduled for 1230. Vitals stable and patient does not appear in any distress.

## 2022-03-19 PROBLEM — N17.9 AKI (ACUTE KIDNEY INJURY) (HCC): Status: ACTIVE | Noted: 2022-02-23

## 2022-03-19 PROBLEM — N39.0 UTI (URINARY TRACT INFECTION): Status: ACTIVE | Noted: 2021-12-13

## 2022-03-19 PROBLEM — S72.92XA FEMUR FRACTURE, LEFT (HCC): Status: ACTIVE | Noted: 2022-02-23

## 2022-03-19 PROBLEM — E04.9 THYROID ENLARGEMENT: Status: ACTIVE | Noted: 2021-12-13

## 2022-03-20 PROBLEM — S00.03XA SCALP HEMATOMA: Status: ACTIVE | Noted: 2021-12-13

## 2023-10-24 ENCOUNTER — TRANSCRIBE ORDERS (OUTPATIENT)
Dept: SCHEDULING | Age: 88
End: 2023-10-24

## 2023-10-24 DIAGNOSIS — R41.82 ALTERED MENTAL STATUS, UNSPECIFIED ALTERED MENTAL STATUS TYPE: Primary | ICD-10-CM

## 2025-06-23 ENCOUNTER — OFFICE VISIT (OUTPATIENT)
Dept: ORTHOPEDIC SURGERY | Age: 89
End: 2025-06-23
Payer: MEDICARE

## 2025-06-23 VITALS — WEIGHT: 135 LBS | HEIGHT: 62 IN | BODY MASS INDEX: 24.84 KG/M2

## 2025-06-23 DIAGNOSIS — M25.511 RIGHT SHOULDER PAIN, UNSPECIFIED CHRONICITY: Primary | ICD-10-CM

## 2025-06-23 DIAGNOSIS — M19.111: ICD-10-CM

## 2025-06-23 PROCEDURE — G8428 CUR MEDS NOT DOCUMENT: HCPCS | Performed by: PHYSICIAN ASSISTANT

## 2025-06-23 PROCEDURE — 1123F ACP DISCUSS/DSCN MKR DOCD: CPT | Performed by: PHYSICIAN ASSISTANT

## 2025-06-23 PROCEDURE — 4004F PT TOBACCO SCREEN RCVD TLK: CPT | Performed by: PHYSICIAN ASSISTANT

## 2025-06-23 PROCEDURE — 20610 DRAIN/INJ JOINT/BURSA W/O US: CPT | Performed by: PHYSICIAN ASSISTANT

## 2025-06-23 PROCEDURE — G8420 CALC BMI NORM PARAMETERS: HCPCS | Performed by: PHYSICIAN ASSISTANT

## 2025-06-23 PROCEDURE — 99203 OFFICE O/P NEW LOW 30 MIN: CPT | Performed by: PHYSICIAN ASSISTANT

## 2025-06-23 PROCEDURE — 1090F PRES/ABSN URINE INCON ASSESS: CPT | Performed by: PHYSICIAN ASSISTANT

## 2025-06-23 NOTE — PROGRESS NOTES
[facility]          Patient ID:  Name: Savannah Ardon  AGE/Gender: 89 y.o. female  MRN: [id number]  : 1935    Date of Consultation:  2025          ALLERGIES:   Allergies   Allergen Reactions    Citalopram Other (See Comments)     Cant remember    Pioglitazone Other (See Comments)     Cant remember    Celecoxib Nausea And Vomiting    Codeine Nausea And Vomiting    Lisinopril Other (See Comments)    Metronidazole Nausea And Vomiting        CC: Right shoulder pain     History:  The patient 89 y.o. right hand dominant female who presents today as a new patient complaining of right shoulder pain.  Patient has a history of a proximal right humerus fracture this was treated nonoperatively many years ago.  There are no records about this.  The patient's daughter reports that she has been complaining of right shoulder pain for the last 6 months.  The patient rates this as a 9 out of 10 on the pain scale.  She has been using topical Voltaren gel for this.  The patient is a resident at [facility].  She has been doing physical activities there.  She localizes pain to the right shoulder.  She is an insulin-dependent diabetic.  She is on Eliquis.  They have no other complaints or concerns.    Review of Systems:  Pertinent items are noted in HPI.    Past Medical History Includes:   Past Medical History:   Diagnosis Date    Chronic kidney disease     renal insufficiency    Diabetes (HCC) 2014    Diabetes (HCC)     Hypertension     Obesity 2016    Psychiatric disorder     depeession   ,   Past Surgical History:   Procedure Laterality Date    CHOLECYSTECTOMY      HIP FRACTURE SURGERY      left hip hemiarthroplasty by Dr. Flores       Family History: No family history on file.     Social History:   Social History     Tobacco Use    Smoking status: Never    Smokeless tobacco: Not on file   Substance Use Topics    Alcohol use: No         Physical Exam:     General:  On exam the

## 2025-08-18 ENCOUNTER — OFFICE VISIT (OUTPATIENT)
Dept: ORTHOPEDIC SURGERY | Age: 89
End: 2025-08-18
Payer: MEDICARE

## 2025-08-18 DIAGNOSIS — M19.111: Primary | ICD-10-CM

## 2025-08-18 PROCEDURE — 1123F ACP DISCUSS/DSCN MKR DOCD: CPT | Performed by: PHYSICIAN ASSISTANT

## 2025-08-18 PROCEDURE — 99213 OFFICE O/P EST LOW 20 MIN: CPT | Performed by: PHYSICIAN ASSISTANT

## 2025-08-18 PROCEDURE — G8428 CUR MEDS NOT DOCUMENT: HCPCS | Performed by: PHYSICIAN ASSISTANT

## 2025-08-18 PROCEDURE — 1090F PRES/ABSN URINE INCON ASSESS: CPT | Performed by: PHYSICIAN ASSISTANT

## 2025-08-18 PROCEDURE — G8420 CALC BMI NORM PARAMETERS: HCPCS | Performed by: PHYSICIAN ASSISTANT

## 2025-08-18 PROCEDURE — 4004F PT TOBACCO SCREEN RCVD TLK: CPT | Performed by: PHYSICIAN ASSISTANT

## (undated) DEVICE — DRAPE SHT 3 QTR PROXIMA 53X77 --

## (undated) DEVICE — DRAPE,HIP,W/POUCHES,STERILE: Brand: MEDLINE

## (undated) DEVICE — SOL IRR SOD CL 0.9% 3000ML --

## (undated) DEVICE — HOOD: Brand: FLYTE

## (undated) DEVICE — 3M™ IOBAN™ 2 ANTIMICROBIAL INCISE DRAPE 6650EZ: Brand: IOBAN™ 2

## (undated) DEVICE — POWDER SURG CELLERATE RX 1 GM HYDROL COLLEGEN

## (undated) DEVICE — SUTURE STRATAFIX SPRL SZ 1 L14IN ABSRB VLT L48CM CTX 1/2 SXPD2B405

## (undated) DEVICE — 7 DAY SILVER-COATED ANTIMICROBIAL BARRIER DRESSING: Brand: ACTICOAT 7  4" X 5"

## (undated) DEVICE — SUTURE STRATAFIX SPRL SZ 3-0 L9IN ABSRB VLT FS L26MM 3/8 SXPD2B419

## (undated) DEVICE — GLOVE SURG SZ 8 L12IN FNGR THK79MIL GRN LTX FREE

## (undated) DEVICE — HIP HEMIARTHROPLASTY: Brand: MEDLINE INDUSTRIES, INC.

## (undated) DEVICE — GLOVE ORANGE PI 7 1/2   MSG9075

## (undated) DEVICE — SUTURE VCRL SZ 1 L36IN ABSRB UD CTX L48MM 1/2 CIR J977H

## (undated) DEVICE — GLOVE ORTHO 8   MSG9480

## (undated) DEVICE — 3000CC GUARDIAN II: Brand: GUARDIAN

## (undated) DEVICE — STRYKER PERFORMANCE SERIES SAGITTAL BLADE: Brand: STRYKER PERFORMANCE SERIES